# Patient Record
Sex: FEMALE | Race: WHITE | NOT HISPANIC OR LATINO
[De-identification: names, ages, dates, MRNs, and addresses within clinical notes are randomized per-mention and may not be internally consistent; named-entity substitution may affect disease eponyms.]

---

## 2020-03-10 PROBLEM — Z00.00 ENCOUNTER FOR PREVENTIVE HEALTH EXAMINATION: Status: ACTIVE | Noted: 2020-03-10

## 2020-03-19 ENCOUNTER — NON-APPOINTMENT (OUTPATIENT)
Age: 80
End: 2020-03-19

## 2020-03-19 ENCOUNTER — APPOINTMENT (OUTPATIENT)
Dept: HEART AND VASCULAR | Facility: CLINIC | Age: 80
End: 2020-03-19
Payer: MEDICARE

## 2020-03-19 VITALS
WEIGHT: 116 LBS | SYSTOLIC BLOOD PRESSURE: 160 MMHG | BODY MASS INDEX: 21.9 KG/M2 | HEIGHT: 61 IN | OXYGEN SATURATION: 98 % | DIASTOLIC BLOOD PRESSURE: 80 MMHG | HEART RATE: 88 BPM | TEMPERATURE: 97.6 F

## 2020-03-19 PROCEDURE — 93000 ELECTROCARDIOGRAM COMPLETE: CPT

## 2020-03-19 PROCEDURE — 36415 COLL VENOUS BLD VENIPUNCTURE: CPT

## 2020-03-19 PROCEDURE — 99204 OFFICE O/P NEW MOD 45 MIN: CPT

## 2020-03-19 NOTE — REASON FOR VISIT
[FreeTextEntry1] : 79 y/o F with HTN, has reduced her meds.   Pt had a BP recently that was very high. She is here for management of her BP.  SHe is the wife of Nathanjose Mendiola.\par \par EKG: NSR, left anterior hemiblock, possible LVH, no ST-Tw abn. 3/18/20\par

## 2020-03-19 NOTE — PHYSICAL EXAM
[General Appearance - Well Developed] : well developed [Normal Appearance] : normal appearance [Well Groomed] : well groomed [General Appearance - Well Nourished] : well nourished [No Deformities] : no deformities [General Appearance - In No Acute Distress] : no acute distress [Normal Conjunctiva] : the conjunctiva exhibited no abnormalities [Eyelids - No Xanthelasma] : the eyelids demonstrated no xanthelasmas [Normal Oral Mucosa] : normal oral mucosa [No Oral Pallor] : no oral pallor [No Oral Cyanosis] : no oral cyanosis [Heart Rate And Rhythm] : heart rate and rhythm were normal [Heart Sounds] : normal S1 and S2 [Respiration, Rhythm And Depth] : normal respiratory rhythm and effort [Exaggerated Use Of Accessory Muscles For Inspiration] : no accessory muscle use [Auscultation Breath Sounds / Voice Sounds] : lungs were clear to auscultation bilaterally [Abdomen Soft] : soft [Abdomen Tenderness] : non-tender [Abdomen Mass (___ Cm)] : no abdominal mass palpated [Abnormal Walk] : normal gait [Gait - Sufficient For Exercise Testing] : the gait was sufficient for exercise testing [Nail Clubbing] : no clubbing of the fingernails [Cyanosis, Localized] : no localized cyanosis [Petechial Hemorrhages (___cm)] : no petechial hemorrhages [Skin Color & Pigmentation] : normal skin color and pigmentation [] : no rash [No Venous Stasis] : no venous stasis [Skin Lesions] : no skin lesions [No Skin Ulcers] : no skin ulcer [No Xanthoma] : no  xanthoma was observed [FreeTextEntry1] : 2/6 diastolic blowing murmur c/w AI

## 2020-03-19 NOTE — ASSESSMENT
[FreeTextEntry1] : HTN-  will increase Lisinopril to 20mg, labs drawn\par \par Murmur- c/w AI\par \par EKG  with a LAHB and possible LVH, will echo after BP controlled\par \par Compliance issue-  told to take pills\par \par Health Maintainence- no recent mammogram, saw GYN, colonoscopy current

## 2020-03-19 NOTE — HISTORY OF PRESENT ILLNESS
[FreeTextEntry1] : PCP-no one\par GYN-2020 on [address], mammogram needed\par GI- Dr Uriostegui 2015 colonoscopy

## 2020-03-20 LAB
ALBUMIN SERPL ELPH-MCNC: 4.4 G/DL
ALP BLD-CCNC: 68 U/L
ALT SERPL-CCNC: 12 U/L
ANION GAP SERPL CALC-SCNC: 11 MMOL/L
APPEARANCE: CLEAR
AST SERPL-CCNC: 16 U/L
BACTERIA: NEGATIVE
BASOPHILS # BLD AUTO: 0.02 K/UL
BASOPHILS NFR BLD AUTO: 0.3 %
BILIRUB SERPL-MCNC: 0.7 MG/DL
BILIRUBIN URINE: NEGATIVE
BLOOD URINE: NEGATIVE
BUN SERPL-MCNC: 18 MG/DL
CALCIUM SERPL-MCNC: 9.5 MG/DL
CHLORIDE SERPL-SCNC: 98 MMOL/L
CHOLEST SERPL-MCNC: 259 MG/DL
CHOLEST/HDLC SERPL: 2.7 RATIO
CO2 SERPL-SCNC: 29 MMOL/L
COLOR: NORMAL
CREAT SERPL-MCNC: 0.74 MG/DL
EOSINOPHIL # BLD AUTO: 0.07 K/UL
EOSINOPHIL NFR BLD AUTO: 1.1 %
ESTIMATED AVERAGE GLUCOSE: 103 MG/DL
GLUCOSE QUALITATIVE U: NEGATIVE
GLUCOSE SERPL-MCNC: 99 MG/DL
HBA1C MFR BLD HPLC: 5.2 %
HCT VFR BLD CALC: 43.5 %
HDLC SERPL-MCNC: 95 MG/DL
HGB BLD-MCNC: 13.5 G/DL
HYALINE CASTS: 0 /LPF
IMM GRANULOCYTES NFR BLD AUTO: 0.2 %
KETONES URINE: NEGATIVE
LDLC SERPL CALC-MCNC: 151 MG/DL
LEUKOCYTE ESTERASE URINE: ABNORMAL
LYMPHOCYTES # BLD AUTO: 1.68 K/UL
LYMPHOCYTES NFR BLD AUTO: 25.6 %
MAN DIFF?: NORMAL
MCHC RBC-ENTMCNC: 30.2 PG
MCHC RBC-ENTMCNC: 31 GM/DL
MCV RBC AUTO: 97.3 FL
MICROSCOPIC-UA: NORMAL
MONOCYTES # BLD AUTO: 0.55 K/UL
MONOCYTES NFR BLD AUTO: 8.4 %
NEUTROPHILS # BLD AUTO: 4.24 K/UL
NEUTROPHILS NFR BLD AUTO: 64.4 %
NITRITE URINE: NEGATIVE
PH URINE: 5.5
PLATELET # BLD AUTO: 193 K/UL
POTASSIUM SERPL-SCNC: 4 MMOL/L
PROT SERPL-MCNC: 6.3 G/DL
PROTEIN URINE: NEGATIVE
RBC # BLD: 4.47 M/UL
RBC # FLD: 12.9 %
RED BLOOD CELLS URINE: 0 /HPF
SODIUM SERPL-SCNC: 138 MMOL/L
SPECIFIC GRAVITY URINE: 1.01
SQUAMOUS EPITHELIAL CELLS: 1 /HPF
TRIGL SERPL-MCNC: 63 MG/DL
UROBILINOGEN URINE: NORMAL
WBC # FLD AUTO: 6.57 K/UL
WHITE BLOOD CELLS URINE: 3 /HPF

## 2020-06-02 ENCOUNTER — APPOINTMENT (OUTPATIENT)
Dept: HEART AND VASCULAR | Facility: CLINIC | Age: 80
End: 2020-06-02
Payer: MEDICARE

## 2020-06-02 VITALS
OXYGEN SATURATION: 96 % | DIASTOLIC BLOOD PRESSURE: 60 MMHG | BODY MASS INDEX: 22.28 KG/M2 | SYSTOLIC BLOOD PRESSURE: 120 MMHG | TEMPERATURE: 98.5 F | HEIGHT: 61 IN | WEIGHT: 117.99 LBS | HEART RATE: 99 BPM

## 2020-06-02 PROCEDURE — 99213 OFFICE O/P EST LOW 20 MIN: CPT

## 2020-06-02 NOTE — PHYSICAL EXAM
[General Appearance - Well Developed] : well developed [Normal Appearance] : normal appearance [Well Groomed] : well groomed [General Appearance - Well Nourished] : well nourished [No Deformities] : no deformities [General Appearance - In No Acute Distress] : no acute distress [Normal Conjunctiva] : the conjunctiva exhibited no abnormalities [Eyelids - No Xanthelasma] : the eyelids demonstrated no xanthelasmas [Normal Oral Mucosa] : normal oral mucosa [No Oral Pallor] : no oral pallor [No Oral Cyanosis] : no oral cyanosis [Exaggerated Use Of Accessory Muscles For Inspiration] : no accessory muscle use [Respiration, Rhythm And Depth] : normal respiratory rhythm and effort [Auscultation Breath Sounds / Voice Sounds] : lungs were clear to auscultation bilaterally [Heart Rate And Rhythm] : heart rate and rhythm were normal [Heart Sounds] : normal S1 and S2 [Abdomen Soft] : soft [Abdomen Tenderness] : non-tender [Abdomen Mass (___ Cm)] : no abdominal mass palpated [Gait - Sufficient For Exercise Testing] : the gait was sufficient for exercise testing [Abnormal Walk] : normal gait [Nail Clubbing] : no clubbing of the fingernails [Petechial Hemorrhages (___cm)] : no petechial hemorrhages [Cyanosis, Localized] : no localized cyanosis [Skin Color & Pigmentation] : normal skin color and pigmentation [No Venous Stasis] : no venous stasis [] : no rash [Skin Lesions] : no skin lesions [No Skin Ulcers] : no skin ulcer [No Xanthoma] : no  xanthoma was observed [FreeTextEntry1] : 2/6 diastolic blowing murmur c/w AI

## 2020-06-02 NOTE — ASSESSMENT
[FreeTextEntry1] : HTN-  will increase Lisinopril to 20mg, labs drawn.  Cr 0.74 on lisinopril 10mg.  BP excellent on 20mg\par \par Murmur- c/w AI, echo ordered\par \par EKG  with a LAHB and possible LVH, will echo after BP controlled\par \par Compliance issue-  told to take pills\par \par Health Maintainence- no recent mammogram, saw GYN, colonoscopy current

## 2020-06-02 NOTE — REASON FOR VISIT
[FreeTextEntry1] : 79 y/o F with HTN, has reduced her meds.   Pt had a BP recently that was very high. She is here for management of her BP.  SHe is the wife of Nathan Mendiola.\par \par 6/2/20- here for BP f/u after she was hypertensive last visit and we increased the Lisinopril.\par \par EKG: NSR, left anterior hemiblock, possible LVH, no ST-Tw abn. 3/18/20\par

## 2020-10-05 ENCOUNTER — APPOINTMENT (OUTPATIENT)
Dept: HEART AND VASCULAR | Facility: CLINIC | Age: 80
End: 2020-10-05
Payer: MEDICARE

## 2020-10-05 VITALS
BODY MASS INDEX: 21.25 KG/M2 | HEIGHT: 61.42 IN | HEART RATE: 103 BPM | DIASTOLIC BLOOD PRESSURE: 60 MMHG | OXYGEN SATURATION: 97 % | SYSTOLIC BLOOD PRESSURE: 122 MMHG | WEIGHT: 114 LBS | TEMPERATURE: 98.8 F

## 2020-10-05 PROCEDURE — 99214 OFFICE O/P EST MOD 30 MIN: CPT

## 2020-10-05 PROCEDURE — 93306 TTE W/DOPPLER COMPLETE: CPT

## 2020-10-05 PROCEDURE — 36415 COLL VENOUS BLD VENIPUNCTURE: CPT

## 2020-10-05 NOTE — REASON FOR VISIT
[FreeTextEntry1] : 81 y/o F with HTN, has reduced her meds.   Pt had a BP recently that was very high. She is here for management of her BP.  SHe is the wife of Nathan Mendiola.\par \par 6/2/20- here for BP f/u after she was hypertensive last visit and we increased the Lisinopril.\par 10/5/20 BP good but taking a larger dose of HCTZ.  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 3/18/20\par

## 2020-10-05 NOTE — PHYSICAL EXAM
[General Appearance - Well Developed] : well developed [Normal Appearance] : normal appearance [Well Groomed] : well groomed [General Appearance - Well Nourished] : well nourished [No Deformities] : no deformities [General Appearance - In No Acute Distress] : no acute distress [Normal Conjunctiva] : the conjunctiva exhibited no abnormalities [Eyelids - No Xanthelasma] : the eyelids demonstrated no xanthelasmas [Respiration, Rhythm And Depth] : normal respiratory rhythm and effort [Exaggerated Use Of Accessory Muscles For Inspiration] : no accessory muscle use [Auscultation Breath Sounds / Voice Sounds] : lungs were clear to auscultation bilaterally [Heart Rate And Rhythm] : heart rate and rhythm were normal [Heart Sounds] : normal S1 and S2 [Abdomen Soft] : soft [Abdomen Tenderness] : non-tender [Abdomen Mass (___ Cm)] : no abdominal mass palpated [Abnormal Walk] : normal gait [Gait - Sufficient For Exercise Testing] : the gait was sufficient for exercise testing [Nail Clubbing] : no clubbing of the fingernails [Cyanosis, Localized] : no localized cyanosis [Petechial Hemorrhages (___cm)] : no petechial hemorrhages [Skin Color & Pigmentation] : normal skin color and pigmentation [] : no rash [No Venous Stasis] : no venous stasis [Skin Lesions] : no skin lesions [No Skin Ulcers] : no skin ulcer [No Xanthoma] : no  xanthoma was observed [FreeTextEntry1] : 2/6 diastolic blowing murmur c/w AI

## 2020-10-05 NOTE — ASSESSMENT
[FreeTextEntry1] : HTN-  will increase Lisinopril to 20mg, labs drawn.  Cr 0.74 on lisinopril 10mg.  BP excellent on 20mg. Chems and UA sent.\par \par Murmur- c/w AI, echo ordered.  It confirms mild AI.\par \par EKG  with a LAHB and possible LVH, will echo after BP controlled.  Mild AI, \par \par Compliance issue-  told to take pills\par \par Health Maintainence- no recent mammogram, saw GYN, colonoscopy current.  Mammo ordered

## 2020-10-06 LAB
ALBUMIN SERPL ELPH-MCNC: 4.6 G/DL
ALP BLD-CCNC: 63 U/L
ALT SERPL-CCNC: 13 U/L
ANION GAP SERPL CALC-SCNC: 13 MMOL/L
APPEARANCE: ABNORMAL
AST SERPL-CCNC: 20 U/L
BACTERIA: ABNORMAL
BILIRUB SERPL-MCNC: 0.7 MG/DL
BILIRUBIN URINE: NEGATIVE
BLOOD URINE: NEGATIVE
BUN SERPL-MCNC: 19 MG/DL
CALCIUM SERPL-MCNC: 9.9 MG/DL
CHLORIDE SERPL-SCNC: 97 MMOL/L
CO2 SERPL-SCNC: 26 MMOL/L
COLOR: YELLOW
CREAT SERPL-MCNC: 0.91 MG/DL
GLUCOSE QUALITATIVE U: NEGATIVE
GLUCOSE SERPL-MCNC: 79 MG/DL
HYALINE CASTS: 3 /LPF
KETONES URINE: NEGATIVE
LEUKOCYTE ESTERASE URINE: ABNORMAL
MICROSCOPIC-UA: NORMAL
NITRITE URINE: NEGATIVE
PH URINE: 6.5
POTASSIUM SERPL-SCNC: 4.4 MMOL/L
PROT SERPL-MCNC: 6.9 G/DL
PROTEIN URINE: NEGATIVE
RED BLOOD CELLS URINE: 1 /HPF
SODIUM SERPL-SCNC: 136 MMOL/L
SPECIFIC GRAVITY URINE: 1.01
SQUAMOUS EPITHELIAL CELLS: 2 /HPF
UROBILINOGEN URINE: NORMAL
WHITE BLOOD CELLS URINE: 12 /HPF

## 2020-11-25 ENCOUNTER — EMERGENCY (EMERGENCY)
Facility: HOSPITAL | Age: 80
LOS: 1 days | Discharge: HOME | End: 2020-11-25
Attending: EMERGENCY MEDICINE | Admitting: EMERGENCY MEDICINE
Payer: MEDICARE

## 2020-11-25 VITALS
TEMPERATURE: 98 F | RESPIRATION RATE: 18 BRPM | DIASTOLIC BLOOD PRESSURE: 79 MMHG | OXYGEN SATURATION: 96 % | SYSTOLIC BLOOD PRESSURE: 160 MMHG | HEART RATE: 88 BPM

## 2020-11-25 DIAGNOSIS — Z20.828 CONTACT WITH AND (SUSPECTED) EXPOSURE TO OTHER VIRAL COMMUNICABLE DISEASES: ICD-10-CM

## 2020-11-25 DIAGNOSIS — I10 ESSENTIAL (PRIMARY) HYPERTENSION: ICD-10-CM

## 2020-11-25 LAB — SARS-COV-2 RNA SPEC QL NAA+PROBE: SIGNIFICANT CHANGE UP

## 2020-11-25 PROCEDURE — 99283 EMERGENCY DEPT VISIT LOW MDM: CPT | Mod: CS

## 2020-11-25 PROCEDURE — U0003: CPT

## 2020-11-25 PROCEDURE — 99283 EMERGENCY DEPT VISIT LOW MDM: CPT

## 2020-11-25 NOTE — ED PROVIDER NOTE - CLINICAL SUMMARY MEDICAL DECISION MAKING FREE TEXT BOX
Patient is presenting to the Emergency Department and requesting a COVID-19 test.  Patient denies any symptoms, has an unremarkable focused exam and looks well.  Nasopharyngeal PCR has been obtained and patient has been guided on how to obtain their results.  General COVID-19 discharge instructions have been given to the patient.     BP addressed.

## 2020-11-25 NOTE — ED PROVIDER NOTE - CARE PLAN
Principal Discharge DX:	Encounter for laboratory testing for COVID-19 virus  Secondary Diagnosis:	Hypertension, unspecified type

## 2021-04-08 ENCOUNTER — NON-APPOINTMENT (OUTPATIENT)
Age: 81
End: 2021-04-08

## 2021-04-08 ENCOUNTER — APPOINTMENT (OUTPATIENT)
Dept: HEART AND VASCULAR | Facility: CLINIC | Age: 81
End: 2021-04-08
Payer: MEDICARE

## 2021-04-08 VITALS
DIASTOLIC BLOOD PRESSURE: 60 MMHG | WEIGHT: 114.99 LBS | BODY MASS INDEX: 21.71 KG/M2 | HEART RATE: 96 BPM | HEIGHT: 61 IN | SYSTOLIC BLOOD PRESSURE: 110 MMHG | OXYGEN SATURATION: 97 % | TEMPERATURE: 98.8 F

## 2021-04-08 PROCEDURE — 99213 OFFICE O/P EST LOW 20 MIN: CPT

## 2021-04-08 PROCEDURE — 93000 ELECTROCARDIOGRAM COMPLETE: CPT

## 2021-04-08 NOTE — PHYSICAL EXAM
[General Appearance - Well Developed] : well developed [Normal Appearance] : normal appearance [Well Groomed] : well groomed [General Appearance - Well Nourished] : well nourished [No Deformities] : no deformities [General Appearance - In No Acute Distress] : no acute distress [Normal Conjunctiva] : the conjunctiva exhibited no abnormalities [Eyelids - No Xanthelasma] : the eyelids demonstrated no xanthelasmas [Respiration, Rhythm And Depth] : normal respiratory rhythm and effort [Exaggerated Use Of Accessory Muscles For Inspiration] : no accessory muscle use [Auscultation Breath Sounds / Voice Sounds] : lungs were clear to auscultation bilaterally [Heart Rate And Rhythm] : heart rate and rhythm were normal [Heart Sounds] : normal S1 and S2 [Abdomen Soft] : soft [Abdomen Tenderness] : non-tender [Abdomen Mass (___ Cm)] : no abdominal mass palpated [Abnormal Walk] : normal gait [Gait - Sufficient For Exercise Testing] : the gait was sufficient for exercise testing [Nail Clubbing] : no clubbing of the fingernails [Cyanosis, Localized] : no localized cyanosis [Petechial Hemorrhages (___cm)] : no petechial hemorrhages [] : no rash [Skin Color & Pigmentation] : normal skin color and pigmentation [No Venous Stasis] : no venous stasis [Skin Lesions] : no skin lesions [No Skin Ulcers] : no skin ulcer [No Xanthoma] : no  xanthoma was observed [FreeTextEntry1] : 2/6 diastolic blowing murmur c/w AI

## 2021-04-08 NOTE — REASON FOR VISIT
[FreeTextEntry1] : 79 y/o F with HTN, has reduced her meds.   Pt had a BP recently that was very high. She is here for management of her BP.  SHe is the wife of Nathan Mendiola.\par \par 6/2/20- here for BP f/u after she was hypertensive last visit and we increased the Lisinopril.\par 10/5/20 BP good but taking a larger dose of HCTZ.  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 3/18/20\par

## 2021-04-08 NOTE — ASSESSMENT
[FreeTextEntry1] : HTN-  will increase Lisinopril to 20mg, labs drawn.  Cr 0.74 on lisinopril 10mg.  BP excellent on 20mg. Chems and UA sent.  New labs excellent.\par \par Murmur- c/w AI, echo ordered.  It confirms mild AI.\par \par EKG  with a LAHB and possible LVH, will echo after BP controlled.  Mild AI, \par \par Compliance issue-  told to take pills\par \par Health Maintainence- no recent mammogram, saw GYN, colonoscopy current.  Mammo ordered.  Done at [facility], I do not have the result

## 2021-04-12 ENCOUNTER — RX RENEWAL (OUTPATIENT)
Age: 81
End: 2021-04-12

## 2021-09-13 ENCOUNTER — APPOINTMENT (OUTPATIENT)
Dept: HEART AND VASCULAR | Facility: CLINIC | Age: 81
End: 2021-09-13
Payer: MEDICARE

## 2021-09-13 VITALS
WEIGHT: 117.99 LBS | OXYGEN SATURATION: 97 % | TEMPERATURE: 98.4 F | SYSTOLIC BLOOD PRESSURE: 130 MMHG | HEART RATE: 100 BPM | DIASTOLIC BLOOD PRESSURE: 76 MMHG | BODY MASS INDEX: 22.28 KG/M2 | HEIGHT: 61 IN

## 2021-09-13 PROCEDURE — 90662 IIV NO PRSV INCREASED AG IM: CPT

## 2021-09-13 PROCEDURE — 99213 OFFICE O/P EST LOW 20 MIN: CPT

## 2021-09-13 PROCEDURE — G0008: CPT

## 2021-09-13 NOTE — ASSESSMENT
[FreeTextEntry1] : HTN-  will increase Lisinopril to 20mg, labs drawn.  Cr 0.74 on lisinopril 10mg.  BP excellent on 20mg. Chems and UA sent.  New labs excellent.  BP controlled\par \par Murmur- c/w AI, echo ordered.  It confirms mild AI.\par \par EKG  with a LAHB and possible LVH, will echo after BP controlled.  Mild AI, \par \par Compliance issue-  told to take pills\par \par Health Maintainence- no recent mammogram, saw GYN, colonoscopy current.  Mammo ordered.  Done at [facility], I do not have the result. Fluzoner HD given

## 2021-09-13 NOTE — HISTORY OF PRESENT ILLNESS
[FreeTextEntry1] : PCP \par GYN-2020 on [address], mammogram needed\par GI- Dr Uriostegui 2015 colonoscopy

## 2021-09-13 NOTE — REASON FOR VISIT
[FreeTextEntry1] : 82 y/o F with HTN, has reduced her meds.   Pt had a BP recently that was very high. She is here for management of her BP.  SHe is the wife of Nathan Mendiola.\par \par 6/2/20- here for BP f/u after she was hypertensive last visit and we increased the Lisinopril.\par 10/5/20 BP good but taking a larger dose of HCTZ.  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 3/18/20\par

## 2021-10-12 ENCOUNTER — RX RENEWAL (OUTPATIENT)
Age: 81
End: 2021-10-12

## 2022-03-24 ENCOUNTER — APPOINTMENT (OUTPATIENT)
Dept: HEART AND VASCULAR | Facility: CLINIC | Age: 82
End: 2022-03-24
Payer: MEDICARE

## 2022-03-24 VITALS
DIASTOLIC BLOOD PRESSURE: 66 MMHG | OXYGEN SATURATION: 96 % | WEIGHT: 116 LBS | HEART RATE: 95 BPM | HEIGHT: 61 IN | SYSTOLIC BLOOD PRESSURE: 130 MMHG | BODY MASS INDEX: 21.9 KG/M2

## 2022-03-24 VITALS
BODY MASS INDEX: 21.9 KG/M2 | HEART RATE: 95 BPM | OXYGEN SATURATION: 96 % | WEIGHT: 115.99 LBS | SYSTOLIC BLOOD PRESSURE: 130 MMHG | HEIGHT: 61 IN | DIASTOLIC BLOOD PRESSURE: 66 MMHG | TEMPERATURE: 94.8 F

## 2022-03-24 PROCEDURE — 36415 COLL VENOUS BLD VENIPUNCTURE: CPT

## 2022-03-24 PROCEDURE — 93000 ELECTROCARDIOGRAM COMPLETE: CPT

## 2022-03-24 PROCEDURE — 99213 OFFICE O/P EST LOW 20 MIN: CPT

## 2022-03-24 NOTE — ASSESSMENT
[FreeTextEntry1] : HTN-  will increase Lisinopril to 20mg, labs drawn.  Cr 0.74 on lisinopril 10mg.  BP excellent on 20mg. Chems and UA sent.  New labs excellent.  BP controlled.  Also on HCTZ QOD.  Labs were drawn today in Office. \par \par Murmur- c/w AI, echo ordered.  It confirms mild AI.\par \par EKG  with a LAHB and possible LVH, will echo after BP controlled.  Mild AI, \par \par Compliance issue-  told to take pills\par \par Health Maintainence- no recent mammogram, saw GYN, colonoscopy current.  Mammo ordered.  Done at [facility], I do not have the result. Fluzoner HD given.  Had  had booster shot for Covid.  Will need Pneumovax

## 2022-03-24 NOTE — REASON FOR VISIT
[FreeTextEntry1] : 81 y/o F with HTN, has reduced her meds.   Pt had a BP recently that was very high. She is here for management of her BP.  SHe is the wife of Nathan Mendiola.\par \par 6/2/20- here for BP f/u after she was hypertensive last visit and we increased the Lisinopril.\par 10/5/20 BP good but taking a larger dose of HCTZ.  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 3/18/20, 3/24/22\par

## 2022-03-24 NOTE — HISTORY OF PRESENT ILLNESS
[FreeTextEntry1] : PCP \par GYN-2021 on [address], mammogram done\par GI- Dr Uriostegui 2015 colonoscopy\par Derm-

## 2022-03-25 LAB
ALBUMIN SERPL ELPH-MCNC: 4.5 G/DL
ALP BLD-CCNC: 72 U/L
ALT SERPL-CCNC: 11 U/L
ANION GAP SERPL CALC-SCNC: 12 MMOL/L
AST SERPL-CCNC: 17 U/L
BASOPHILS # BLD AUTO: 0.02 K/UL
BASOPHILS NFR BLD AUTO: 0.3 %
BILIRUB SERPL-MCNC: 0.4 MG/DL
BUN SERPL-MCNC: 14 MG/DL
CALCIUM SERPL-MCNC: 9.7 MG/DL
CHLORIDE SERPL-SCNC: 100 MMOL/L
CHOLEST SERPL-MCNC: 273 MG/DL
CO2 SERPL-SCNC: 26 MMOL/L
CREAT SERPL-MCNC: 0.75 MG/DL
EGFR: 80 ML/MIN/1.73M2
EOSINOPHIL # BLD AUTO: 0.21 K/UL
EOSINOPHIL NFR BLD AUTO: 2.6 %
ESTIMATED AVERAGE GLUCOSE: 105 MG/DL
GLUCOSE SERPL-MCNC: 84 MG/DL
HBA1C MFR BLD HPLC: 5.3 %
HCT VFR BLD CALC: 42.4 %
HDLC SERPL-MCNC: 91 MG/DL
HGB BLD-MCNC: 13.4 G/DL
IMM GRANULOCYTES NFR BLD AUTO: 0.4 %
LDLC SERPL CALC-MCNC: 160 MG/DL
LYMPHOCYTES # BLD AUTO: 1.76 K/UL
LYMPHOCYTES NFR BLD AUTO: 22.1 %
MAN DIFF?: NORMAL
MCHC RBC-ENTMCNC: 29.4 PG
MCHC RBC-ENTMCNC: 31.6 GM/DL
MCV RBC AUTO: 93 FL
MONOCYTES # BLD AUTO: 0.68 K/UL
MONOCYTES NFR BLD AUTO: 8.6 %
NEUTROPHILS # BLD AUTO: 5.25 K/UL
NEUTROPHILS NFR BLD AUTO: 66 %
NONHDLC SERPL-MCNC: 182 MG/DL
PLATELET # BLD AUTO: 229 K/UL
POTASSIUM SERPL-SCNC: 4.7 MMOL/L
PROT SERPL-MCNC: 6.8 G/DL
RBC # BLD: 4.56 M/UL
RBC # FLD: 13 %
SODIUM SERPL-SCNC: 138 MMOL/L
TRIGL SERPL-MCNC: 109 MG/DL
TSH SERPL-ACNC: 3.57 UIU/ML
WBC # FLD AUTO: 7.95 K/UL

## 2023-01-11 ENCOUNTER — APPOINTMENT (OUTPATIENT)
Dept: HEART AND VASCULAR | Facility: CLINIC | Age: 83
End: 2023-01-11
Payer: MEDICARE

## 2023-01-11 ENCOUNTER — NON-APPOINTMENT (OUTPATIENT)
Age: 83
End: 2023-01-11

## 2023-01-11 VITALS
OXYGEN SATURATION: 98 % | HEIGHT: 61 IN | TEMPERATURE: 98.7 F | BODY MASS INDEX: 21.9 KG/M2 | WEIGHT: 116 LBS | SYSTOLIC BLOOD PRESSURE: 120 MMHG | DIASTOLIC BLOOD PRESSURE: 70 MMHG

## 2023-01-11 PROCEDURE — 93000 ELECTROCARDIOGRAM COMPLETE: CPT

## 2023-01-11 PROCEDURE — 99214 OFFICE O/P EST MOD 30 MIN: CPT

## 2023-01-11 PROCEDURE — 36415 COLL VENOUS BLD VENIPUNCTURE: CPT

## 2023-01-11 NOTE — REASON FOR VISIT
[FreeTextEntry1] : 83 y/o F with HTN, has reduced her meds.   Pt had a BP recently that was very high. She is here for management of her BP.  SHe is the wife of Nathan Mendiola.\par \par 6/2/20- here for BP f/u after she was hypertensive last visit and we increased the Lisinopril.\par 10/5/20 BP good but taking a larger dose of HCTZ.  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  They are deep abd symmetrical.  Pt denies any c/o but her daughter says she felt very ill for 5 hrs 2 weeks ago.\par \par EKG: NSR, left anterior hemiblock, possible LVH, no ST-Tw abn. 3/18/20, 3/24/22\par

## 2023-01-11 NOTE — ASSESSMENT
[FreeTextEntry1] : New flipped Tw- 1/11/22, pt asymptomatic but daughter tellls me she felt ill for 5 hrs 2 wks ago.  Trop sent, echo tomorrow, will WMA then cath. If not consider CCTA or Nuc.\par \par HTN-  will increase Lisinopril to 20mg, labs drawn.  Cr 0.74 on lisinopril 10mg.  BP excellent on 20mg. Chems and UA sent.  New labs excellent.  BP controlled.  Also on HCTZ QOD.  Labs were drawn today in Office. \par \par Murmur- c/w AI, echo ordered.  It confirms mild AI.\par \par EKG  with a LAHB and possible LVH, will echo after BP controlled.  Mild AI, \par \par Compliance issue-  told to take pills\par \par Health Maintainence- no recent mammogram, saw GYN, colonoscopy current.  Mammo ordered.  Done at [facility], I do not have the result. Fluzoner HD given.  Had  had booster shot for Covid.  Will need Pneumovax

## 2023-01-12 ENCOUNTER — APPOINTMENT (OUTPATIENT)
Dept: HEART AND VASCULAR | Facility: CLINIC | Age: 83
End: 2023-01-12
Payer: MEDICARE

## 2023-01-12 VITALS
HEIGHT: 61 IN | BODY MASS INDEX: 21.71 KG/M2 | TEMPERATURE: 98.3 F | SYSTOLIC BLOOD PRESSURE: 122 MMHG | DIASTOLIC BLOOD PRESSURE: 66 MMHG | HEART RATE: 98 BPM | WEIGHT: 115 LBS | OXYGEN SATURATION: 98 %

## 2023-01-12 LAB
ALBUMIN SERPL ELPH-MCNC: 4.6 G/DL
ALP BLD-CCNC: 65 U/L
ALT SERPL-CCNC: 13 U/L
ANION GAP SERPL CALC-SCNC: 12 MMOL/L
AST SERPL-CCNC: 18 U/L
BASOPHILS # BLD AUTO: 0.02 K/UL
BASOPHILS NFR BLD AUTO: 0.3 %
BILIRUB SERPL-MCNC: 0.7 MG/DL
BUN SERPL-MCNC: 22 MG/DL
CALCIUM SERPL-MCNC: 9.6 MG/DL
CHLORIDE SERPL-SCNC: 100 MMOL/L
CHOLEST SERPL-MCNC: 272 MG/DL
CO2 SERPL-SCNC: 26 MMOL/L
CREAT SERPL-MCNC: 0.82 MG/DL
EGFR: 72 ML/MIN/1.73M2
EOSINOPHIL # BLD AUTO: 0.11 K/UL
EOSINOPHIL NFR BLD AUTO: 1.5 %
GLUCOSE SERPL-MCNC: 80 MG/DL
HCT VFR BLD CALC: 42 %
HDLC SERPL-MCNC: 93 MG/DL
HGB BLD-MCNC: 13.2 G/DL
IMM GRANULOCYTES NFR BLD AUTO: 0.3 %
LDLC SERPL CALC-MCNC: 161 MG/DL
LYMPHOCYTES # BLD AUTO: 2.03 K/UL
LYMPHOCYTES NFR BLD AUTO: 27.1 %
MAN DIFF?: NORMAL
MCHC RBC-ENTMCNC: 30.1 PG
MCHC RBC-ENTMCNC: 31.4 GM/DL
MCV RBC AUTO: 95.9 FL
MONOCYTES # BLD AUTO: 0.64 K/UL
MONOCYTES NFR BLD AUTO: 8.5 %
NEUTROPHILS # BLD AUTO: 4.67 K/UL
NEUTROPHILS NFR BLD AUTO: 62.3 %
NONHDLC SERPL-MCNC: 179 MG/DL
PLATELET # BLD AUTO: 203 K/UL
POTASSIUM SERPL-SCNC: 4.1 MMOL/L
PROT SERPL-MCNC: 6.8 G/DL
RBC # BLD: 4.38 M/UL
RBC # FLD: 12.6 %
SODIUM SERPL-SCNC: 138 MMOL/L
TRIGL SERPL-MCNC: 88 MG/DL
TROPONIN-T, HIGH SENSITIVITY: 9 NG/L
WBC # FLD AUTO: 7.49 K/UL

## 2023-01-12 PROCEDURE — 93306 TTE W/DOPPLER COMPLETE: CPT

## 2023-01-17 ENCOUNTER — APPOINTMENT (OUTPATIENT)
Dept: HEART AND VASCULAR | Facility: CLINIC | Age: 83
End: 2023-01-17
Payer: MEDICARE

## 2023-01-17 VITALS — BODY MASS INDEX: 21.71 KG/M2 | HEIGHT: 61 IN | WEIGHT: 115 LBS

## 2023-01-17 PROCEDURE — A9500: CPT

## 2023-01-17 PROCEDURE — 36415 COLL VENOUS BLD VENIPUNCTURE: CPT

## 2023-01-17 PROCEDURE — 78452 HT MUSCLE IMAGE SPECT MULT: CPT

## 2023-01-17 PROCEDURE — 93306 TTE W/DOPPLER COMPLETE: CPT

## 2023-01-17 PROCEDURE — 93015 CV STRESS TEST SUPVJ I&R: CPT

## 2023-08-09 ENCOUNTER — NON-APPOINTMENT (OUTPATIENT)
Age: 83
End: 2023-08-09

## 2023-08-09 ENCOUNTER — APPOINTMENT (OUTPATIENT)
Dept: HEART AND VASCULAR | Facility: CLINIC | Age: 83
End: 2023-08-09
Payer: MEDICARE

## 2023-08-09 VITALS
HEART RATE: 87 BPM | OXYGEN SATURATION: 98 % | HEIGHT: 61 IN | SYSTOLIC BLOOD PRESSURE: 140 MMHG | WEIGHT: 114.99 LBS | BODY MASS INDEX: 21.71 KG/M2 | DIASTOLIC BLOOD PRESSURE: 70 MMHG

## 2023-08-09 VITALS
HEIGHT: 61 IN | DIASTOLIC BLOOD PRESSURE: 70 MMHG | WEIGHT: 114 LBS | BODY MASS INDEX: 21.52 KG/M2 | HEART RATE: 90 BPM | OXYGEN SATURATION: 97 % | TEMPERATURE: 98.4 F | RESPIRATION RATE: 16 BRPM | SYSTOLIC BLOOD PRESSURE: 124 MMHG

## 2023-08-09 DIAGNOSIS — Z82.5 FAMILY HISTORY OF ASTHMA AND OTHER CHRONIC LOWER RESPIRATORY DISEASES: ICD-10-CM

## 2023-08-09 DIAGNOSIS — R94.31 ABNORMAL ELECTROCARDIOGRAM [ECG] [EKG]: ICD-10-CM

## 2023-08-09 PROCEDURE — 93000 ELECTROCARDIOGRAM COMPLETE: CPT

## 2023-08-09 PROCEDURE — G0405: CPT

## 2023-08-09 PROCEDURE — 99214 OFFICE O/P EST MOD 30 MIN: CPT

## 2023-08-09 NOTE — ASSESSMENT
[FreeTextEntry1] : New flipped Tw- 1/11/22, pt asymptomatic but daughter tellls me she felt ill for 5 hrs 2 wks ago.  Trop sent, echo .  NL Nuc Jan 2023, EKG normalized.  HTN-  will increase Lisinopril to 20mg, labs drawn.  Cr 0.74 on lisinopril 10mg.  BP excellent on 20mg. Chems and UA sent.  New labs excellent.  BP controlled.  Also on HCTZ QOD.  Labs were drawn today in Office.   Murmur- c/w AI, echo ordered.  It confirms mild AI.  HLD- LDL runs 160 on Lipitor 20 mg, will increase to 40 mg on 8/9/23  EKG  with a LAHB and possible LVH, will echo after BP controlled.  Mild AI,   Compliance issue-  told to take pills  Health Maintainence- no recent mammogram, saw GYN, colonoscopy current.  Mammo ordered.  Done at [facility], I do not have the result. Fluzone HD given.  Had  had booster shot for Covid.  Will need Pneumovax.  Rec RSV vaccine.

## 2023-08-15 RX ORDER — ATORVASTATIN CALCIUM 40 MG/1
40 TABLET, FILM COATED ORAL
Qty: 90 | Refills: 3 | Status: ACTIVE | COMMUNITY
Start: 2023-01-12 | End: 1900-01-01

## 2023-08-15 RX ORDER — LISINOPRIL 20 MG/1
20 TABLET ORAL
Qty: 90 | Refills: 3 | Status: ACTIVE | COMMUNITY
Start: 1900-01-01 | End: 1900-01-01

## 2024-02-07 ENCOUNTER — APPOINTMENT (OUTPATIENT)
Dept: HEART AND VASCULAR | Facility: CLINIC | Age: 84
End: 2024-02-07
Payer: MEDICARE

## 2024-02-07 VITALS
HEART RATE: 93 BPM | OXYGEN SATURATION: 98 % | DIASTOLIC BLOOD PRESSURE: 70 MMHG | TEMPERATURE: 97.6 F | HEIGHT: 61 IN | BODY MASS INDEX: 20.39 KG/M2 | WEIGHT: 108 LBS | SYSTOLIC BLOOD PRESSURE: 108 MMHG

## 2024-02-07 DIAGNOSIS — R01.1 CARDIAC MURMUR, UNSPECIFIED: ICD-10-CM

## 2024-02-07 DIAGNOSIS — E78.00 PURE HYPERCHOLESTEROLEMIA, UNSPECIFIED: ICD-10-CM

## 2024-02-07 DIAGNOSIS — I10 ESSENTIAL (PRIMARY) HYPERTENSION: ICD-10-CM

## 2024-02-07 PROCEDURE — 36415 COLL VENOUS BLD VENIPUNCTURE: CPT

## 2024-02-07 PROCEDURE — 99213 OFFICE O/P EST LOW 20 MIN: CPT

## 2024-02-07 RX ORDER — HYDROCHLOROTHIAZIDE 12.5 MG/1
12.5 TABLET ORAL
Qty: 40 | Refills: 3 | Status: ACTIVE | COMMUNITY
Start: 2021-04-12

## 2024-02-07 NOTE — REASON FOR VISIT
[FreeTextEntry1] : 81 y/o F with HTN, has reduced her meds.   Pt had a BP recently that was very high. She is here for management of her BP.  SHe is the wife of Nathan Mendiola.\par  \par  6/2/20- here for BP f/u after she was hypertensive last visit and we increased the Lisinopril.\par  10/5/20 BP good but taking a larger dose of HCTZ.  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  They are deep abd symmetrical.  Pt denies any c/o but her daughter says she felt very ill for 5 hrs 2 weeks ago.\par  \par  EKG: NSR, left anterior hemiblock, possible LVH, no ST-Tw abn. 3/18/20, 3/24/22\par

## 2024-02-07 NOTE — ASSESSMENT
[FreeTextEntry1] : New flipped Tw- 1/11/22, pt asymptomatic but daughter tells me she felt ill for 5 hrs 2 wks ago.  Trop sent, echo .  NL Nuc Jan 2023, EKG normalized.  HTN-  will increase Lisinopril to 20mg, labs drawn.  Cr 0.74 on lisinopril 10mg.  BP excellent on 20mg. Chems and UA sent.  New labs excellent.  BP controlled.  Also on HCTZ QOD.  Labs were drawn today in Office.   Taking HCTZ daily, given written instructions for MWF  Murmur- c/w AI, echo ordered.  It confirms mild AI.  HLD- LDL runs 160 on Lipitor 20 mg, will increase to 40 mg on 8/9/23  Labs were drawn today in Office.   EKG  with a LAHB and possible LVH, will echo after BP controlled.  Mild AI,   Compliance issue-  told to take pills  Health Maintenance- no recent mammogram, saw GYN, colonoscopy current.  Mammo ordered.  Done at [facility], I do not have the result. Fluzone HD given.  Had  had booster shot for Covid.  Will need Pneumovax.  Rec RSV vaccine.  Flu shot for 0112-4363 given 2/7/24, told Oct is best month.  Told to f/u with Derm and Ophtho.

## 2024-02-09 LAB
ALBUMIN SERPL ELPH-MCNC: 4.4 G/DL
ALP BLD-CCNC: 79 U/L
ALT SERPL-CCNC: 17 U/L
ANION GAP SERPL CALC-SCNC: 8 MMOL/L
AST SERPL-CCNC: 21 U/L
BASOPHILS # BLD AUTO: 0.02 K/UL
BASOPHILS NFR BLD AUTO: 0.3 %
BILIRUB SERPL-MCNC: 0.7 MG/DL
BUN SERPL-MCNC: 19 MG/DL
CALCIUM SERPL-MCNC: 9.9 MG/DL
CHLORIDE SERPL-SCNC: 99 MMOL/L
CHOLEST SERPL-MCNC: 173 MG/DL
CO2 SERPL-SCNC: 29 MMOL/L
CREAT SERPL-MCNC: 0.8 MG/DL
EGFR: 74 ML/MIN/1.73M2
EOSINOPHIL # BLD AUTO: 0.1 K/UL
EOSINOPHIL NFR BLD AUTO: 1.4 %
GLUCOSE SERPL-MCNC: 84 MG/DL
HCT VFR BLD CALC: 42.6 %
HDLC SERPL-MCNC: 85 MG/DL
HGB BLD-MCNC: 13.2 G/DL
IMM GRANULOCYTES NFR BLD AUTO: 0.3 %
LDLC SERPL CALC-MCNC: 76 MG/DL
LYMPHOCYTES # BLD AUTO: 1.19 K/UL
LYMPHOCYTES NFR BLD AUTO: 16.2 %
MAN DIFF?: NORMAL
MCHC RBC-ENTMCNC: 29.3 PG
MCHC RBC-ENTMCNC: 31 GM/DL
MCV RBC AUTO: 94.7 FL
MONOCYTES # BLD AUTO: 0.8 K/UL
MONOCYTES NFR BLD AUTO: 10.9 %
NEUTROPHILS # BLD AUTO: 5.2 K/UL
NEUTROPHILS NFR BLD AUTO: 70.9 %
NONHDLC SERPL-MCNC: 89 MG/DL
PLATELET # BLD AUTO: 200 K/UL
POTASSIUM SERPL-SCNC: 5 MMOL/L
PROT SERPL-MCNC: 6.8 G/DL
RBC # BLD: 4.5 M/UL
RBC # FLD: 13.2 %
SODIUM SERPL-SCNC: 135 MMOL/L
TRIGL SERPL-MCNC: 63 MG/DL
TSH SERPL-ACNC: 2.08 UIU/ML
WBC # FLD AUTO: 7.33 K/UL

## 2024-06-05 ENCOUNTER — APPOINTMENT (OUTPATIENT)
Dept: HEART AND VASCULAR | Facility: CLINIC | Age: 84
End: 2024-06-05

## 2024-07-10 ENCOUNTER — RX RENEWAL (OUTPATIENT)
Age: 84
End: 2024-07-10

## 2024-07-30 ENCOUNTER — APPOINTMENT (OUTPATIENT)
Dept: HEART AND VASCULAR | Facility: CLINIC | Age: 84
End: 2024-07-30
Payer: MEDICARE

## 2024-07-30 ENCOUNTER — NON-APPOINTMENT (OUTPATIENT)
Age: 84
End: 2024-07-30

## 2024-07-30 VITALS
TEMPERATURE: 99.2 F | OXYGEN SATURATION: 97 % | HEART RATE: 87 BPM | DIASTOLIC BLOOD PRESSURE: 78 MMHG | SYSTOLIC BLOOD PRESSURE: 144 MMHG | BODY MASS INDEX: 20.77 KG/M2 | WEIGHT: 110 LBS | HEIGHT: 61 IN

## 2024-07-30 DIAGNOSIS — I10 ESSENTIAL (PRIMARY) HYPERTENSION: ICD-10-CM

## 2024-07-30 DIAGNOSIS — R01.1 CARDIAC MURMUR, UNSPECIFIED: ICD-10-CM

## 2024-07-30 DIAGNOSIS — R94.31 ABNORMAL ELECTROCARDIOGRAM [ECG] [EKG]: ICD-10-CM

## 2024-07-30 DIAGNOSIS — E78.00 PURE HYPERCHOLESTEROLEMIA, UNSPECIFIED: ICD-10-CM

## 2024-07-30 PROCEDURE — G0009: CPT

## 2024-07-30 PROCEDURE — 99214 OFFICE O/P EST MOD 30 MIN: CPT

## 2024-07-30 PROCEDURE — 90677 PCV20 VACCINE IM: CPT

## 2024-07-30 PROCEDURE — 93000 ELECTROCARDIOGRAM COMPLETE: CPT

## 2024-07-30 RX ORDER — ASPIRIN 81 MG/1
81 TABLET, DELAYED RELEASE ORAL
Qty: 90 | Refills: 3 | Status: ACTIVE | COMMUNITY
Start: 2024-07-30

## 2024-07-30 NOTE — ASSESSMENT
[FreeTextEntry1] : New flipped Tw- 1/11/22, pt asymptomatic but daughter tells me she felt ill for 5 hrs 2 wks ago.  Trop sent, echo .  NL Nuc Jan 2023, EKG normalized.  On ASA 81 mg  HTN-  will increase Lisinopril to 20mg, labs drawn.  Cr 0.74 on lisinopril 10mg.  BP excellent on 20mg. Chems and UA sent.  New labs excellent.  BP controlled.  Also on HCTZ QOD.  Labs were drawn today in Office.   Taking HCTZ daily, given written instructions for MWFS.  BP up slightly  Murmur- c/w AI, echo ordered.  It confirms mild AI.  HLD- LDL runs 160 on Lipitor 20 mg, will increase to 40 mg on 8/9/23  Labs were drawn today in Office. LDL 93  EKG  with a LAHB and possible LVH, will echo after BP controlled.  Mild AI,   Compliance issue-  told to take pills  Health Maintenance- no recent mammogram, saw GYN, colonoscopy current.  Mammo ordered.  Done at [facility], I do not have the result. Fluzone HD given.  Had  had booster shot for Covid.  Will need Pneumovax.  Rec RSV vaccine.  Flu shot for [id number] given 2/7/24, told Oct is best month.  Told to f/u with Derm and Ophtho.  Prevnar 20 given 7/30/24.

## 2024-07-30 NOTE — HISTORY OF PRESENT ILLNESS
[FreeTextEntry1] : PCP  GYN-2021 on [address], mammogram done GI- Dr Uriostegui 2015 colonoscopy Derm-  Neuro

## 2024-07-30 NOTE — REASON FOR VISIT
[FreeTextEntry1] : 83 y/o F with HTN, has reduced her meds.   Pt had a BP recently that was very high. She is here for management of her BP.  SHe is the wife of Nathan Mendiola.  6/2/20- here for BP f/u after she was hypertensive last visit and we increased the Lisinopril. 10/5/20 BP good but taking a larger dose of HCTZ.  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 3/24/22  Doing well but can't remember the names of her other MDs 1/11/23 New flipped Tw on EKGI,L, V5-6.  They are deep abd symmetrical.  Pt denies any c/o but her daughter says she felt very ill for 5 hrs 2 weeks ago. 7/30/24  Needs Mohs, Neurocog w/u ongoing in [place].  EKG: NSR, left anterior hemiblock, possible LVH, no ST-Tw abn. 3/18/20, 3/24/22

## 2024-10-14 VITALS
OXYGEN SATURATION: 95 % | RESPIRATION RATE: 16 BRPM | HEART RATE: 110 BPM | SYSTOLIC BLOOD PRESSURE: 109 MMHG | TEMPERATURE: 100 F | DIASTOLIC BLOOD PRESSURE: 67 MMHG

## 2024-10-14 LAB
ADD ON TEST-SPECIMEN IN LAB: SIGNIFICANT CHANGE UP
ANION GAP SERPL CALC-SCNC: 11 MMOL/L — SIGNIFICANT CHANGE UP (ref 5–17)
APPEARANCE UR: ABNORMAL
BACTERIA # UR AUTO: NEGATIVE /HPF — SIGNIFICANT CHANGE UP
BASOPHILS # BLD AUTO: 0 K/UL — SIGNIFICANT CHANGE UP (ref 0–0.2)
BASOPHILS NFR BLD AUTO: 0 % — SIGNIFICANT CHANGE UP (ref 0–2)
BILIRUB UR-MCNC: NEGATIVE — SIGNIFICANT CHANGE UP
BUN SERPL-MCNC: 48 MG/DL — HIGH (ref 7–23)
BURR CELLS BLD QL SMEAR: PRESENT — SIGNIFICANT CHANGE UP
CALCIUM SERPL-MCNC: 7.9 MG/DL — LOW (ref 8.4–10.5)
CAST: 27 /LPF — HIGH (ref 0–4)
CHLORIDE SERPL-SCNC: 95 MMOL/L — LOW (ref 96–108)
CK MB CFR SERPL CALC: 13.2 NG/ML — HIGH (ref 0–6.7)
CK SERPL-CCNC: 371 U/L — HIGH (ref 25–170)
CO2 SERPL-SCNC: 23 MMOL/L — SIGNIFICANT CHANGE UP (ref 22–31)
COARSE GRAN CASTS #/AREA URNS AUTO: PRESENT
COLOR SPEC: YELLOW — SIGNIFICANT CHANGE UP
CREAT SERPL-MCNC: 1.46 MG/DL — HIGH (ref 0.5–1.3)
DIFF PNL FLD: ABNORMAL
EGFR: 36 ML/MIN/1.73M2 — LOW
EOSINOPHIL # BLD AUTO: 0 K/UL — SIGNIFICANT CHANGE UP (ref 0–0.5)
EOSINOPHIL NFR BLD AUTO: 0 % — SIGNIFICANT CHANGE UP (ref 0–6)
GLUCOSE SERPL-MCNC: 120 MG/DL — HIGH (ref 70–99)
GLUCOSE UR QL: NEGATIVE MG/DL — SIGNIFICANT CHANGE UP
HCT VFR BLD CALC: 32.4 % — LOW (ref 34.5–45)
HGB BLD-MCNC: 10.6 G/DL — LOW (ref 11.5–15.5)
KETONES UR-MCNC: NEGATIVE MG/DL — SIGNIFICANT CHANGE UP
LACTATE SERPL-SCNC: 1 MMOL/L — SIGNIFICANT CHANGE UP (ref 0.5–2)
LEUKOCYTE ESTERASE UR-ACNC: ABNORMAL
LYMPHOCYTES # BLD AUTO: 0.49 K/UL — LOW (ref 1–3.3)
LYMPHOCYTES # BLD AUTO: 1.7 % — LOW (ref 13–44)
MANUAL SMEAR VERIFICATION: SIGNIFICANT CHANGE UP
MCHC RBC-ENTMCNC: 29.3 PG — SIGNIFICANT CHANGE UP (ref 27–34)
MCHC RBC-ENTMCNC: 32.7 GM/DL — SIGNIFICANT CHANGE UP (ref 32–36)
MCV RBC AUTO: 89.5 FL — SIGNIFICANT CHANGE UP (ref 80–100)
MONOCYTES # BLD AUTO: 1.75 K/UL — HIGH (ref 0–0.9)
MONOCYTES NFR BLD AUTO: 6.1 % — SIGNIFICANT CHANGE UP (ref 2–14)
MUCOUS THREADS # UR AUTO: PRESENT
NEUTROPHILS # BLD AUTO: 26.47 K/UL — HIGH (ref 1.8–7.4)
NEUTROPHILS NFR BLD AUTO: 92.2 % — HIGH (ref 43–77)
NITRITE UR-MCNC: NEGATIVE — SIGNIFICANT CHANGE UP
OVALOCYTES BLD QL SMEAR: SLIGHT — SIGNIFICANT CHANGE UP
PH UR: 5 — SIGNIFICANT CHANGE UP (ref 5–8)
PLAT MORPH BLD: NORMAL — SIGNIFICANT CHANGE UP
PLATELET # BLD AUTO: 190 K/UL — SIGNIFICANT CHANGE UP (ref 150–400)
POIKILOCYTOSIS BLD QL AUTO: SIGNIFICANT CHANGE UP
POLYCHROMASIA BLD QL SMEAR: SLIGHT — SIGNIFICANT CHANGE UP
POTASSIUM SERPL-MCNC: 3.1 MMOL/L — LOW (ref 3.5–5.3)
POTASSIUM SERPL-SCNC: 3.1 MMOL/L — LOW (ref 3.5–5.3)
PROT UR-MCNC: 100 MG/DL
RBC # BLD: 3.62 M/UL — LOW (ref 3.8–5.2)
RBC # FLD: 13.2 % — SIGNIFICANT CHANGE UP (ref 10.3–14.5)
RBC BLD AUTO: ABNORMAL
RBC CASTS # UR COMP ASSIST: 5 /HPF — HIGH (ref 0–4)
SODIUM SERPL-SCNC: 129 MMOL/L — LOW (ref 135–145)
SP GR SPEC: >1.03 — HIGH (ref 1–1.03)
SPHEROCYTES BLD QL SMEAR: SLIGHT — SIGNIFICANT CHANGE UP
SQUAMOUS # UR AUTO: 3 /HPF — SIGNIFICANT CHANGE UP (ref 0–5)
UROBILINOGEN FLD QL: 1 MG/DL — SIGNIFICANT CHANGE UP (ref 0.2–1)
WBC # BLD: 28.71 K/UL — HIGH (ref 3.8–10.5)
WBC # FLD AUTO: 28.71 K/UL — HIGH (ref 3.8–10.5)
WBC CLUMPS # UR AUTO: PRESENT
WBC UR QL: 409 /HPF — HIGH (ref 0–5)

## 2024-10-14 PROCEDURE — 93010 ELECTROCARDIOGRAM REPORT: CPT

## 2024-10-14 PROCEDURE — 99291 CRITICAL CARE FIRST HOUR: CPT

## 2024-10-14 PROCEDURE — 70450 CT HEAD/BRAIN W/O DYE: CPT | Mod: 26,MC,XU

## 2024-10-14 PROCEDURE — 70496 CT ANGIOGRAPHY HEAD: CPT | Mod: 26,MC

## 2024-10-14 PROCEDURE — 70498 CT ANGIOGRAPHY NECK: CPT | Mod: 26,MC

## 2024-10-14 PROCEDURE — 0042T: CPT | Mod: MC

## 2024-10-14 RX ORDER — SODIUM CHLORIDE 0.9 % (FLUSH) 0.9 %
1000 SYRINGE (ML) INJECTION ONCE
Refills: 0 | Status: COMPLETED | OUTPATIENT
Start: 2024-10-14 | End: 2024-10-14

## 2024-10-14 RX ORDER — ACETAMINOPHEN 325 MG
975 TABLET ORAL ONCE
Refills: 0 | Status: COMPLETED | OUTPATIENT
Start: 2024-10-14 | End: 2024-10-14

## 2024-10-14 RX ORDER — CEFTRIAXONE SODIUM 1 G
1000 VIAL (EA) INJECTION ONCE
Refills: 0 | Status: COMPLETED | OUTPATIENT
Start: 2024-10-14 | End: 2024-10-14

## 2024-10-14 RX ORDER — SODIUM CHLORIDE 0.9 % (FLUSH) 0.9 %
1550 SYRINGE (ML) INJECTION ONCE
Refills: 0 | Status: COMPLETED | OUTPATIENT
Start: 2024-10-14 | End: 2024-10-14

## 2024-10-14 RX ADMIN — Medication 100 MILLIGRAM(S): at 21:51

## 2024-10-14 RX ADMIN — Medication 975 MILLIGRAM(S): at 21:51

## 2024-10-14 RX ADMIN — Medication 40 MILLIEQUIVALENT(S): at 21:50

## 2024-10-14 RX ADMIN — Medication 1550 MILLILITER(S): at 21:55

## 2024-10-14 RX ADMIN — Medication 1000 MILLILITER(S): at 23:19

## 2024-10-14 RX ADMIN — Medication 100 MILLIEQUIVALENT(S): at 23:12

## 2024-10-14 NOTE — STROKE CODE NOTE - [facility] STROKE SCALE: 3. VISUAL, QM
making necessary changes now, attend support group, and re-visit the dietitian in one month.     Peter Graham RD (0) No visual loss

## 2024-10-14 NOTE — ED ADULT NURSE REASSESSMENT NOTE - NS ED NURSE REASSESS COMMENT FT1
Approx 2010, pt daughter @ bedside alerts charge RN of "L-sided arm drift." Pt UPGRADED to  Director,  Director @ bedside. STROKE CODE CALLED, stroke PA @ bedside. Pt transported to CT scan on monitor, primary RN @ bedside. Pending further ED & stroke workup.

## 2024-10-14 NOTE — CONSULT NOTE ADULT - SUBJECTIVE AND OBJECTIVE BOX
**STROKE CODE CONSULT NOTE**    Last known well time/Time of onset of symptoms: unknown    HPI: 84y Female with PMHx of HTN, HLD presents to ED for generalized weakness and fatigue x 1 week, per daughter was dx with UTI a week ago and was started on abx today. 2 hours later stroke code was called as ED provider noticed left upper extremity weakness. Per daughter she does not know when the patient started to develop LUE weakness. Daughter walked the patient at 430pm today and gait was normal. Then at 530pm pt became unsteady. NIHSS 2 for L NLFF and mild LUE weakness. CT imaging unremarkable. TNK was discussed however given mildly disabling sx and unclear when LUE weakness started TNK was deferred after discussion with Dr. Crews and Dr. Ring. Of note WBC 28, Na 129, trop 368, rectal temp 101.4.    T(C): 38.6 (10-14-24 @ 21:07), Max: 38.6 (10-14-24 @ 21:07)  HR: 110 (10-14-24 @ 18:35) (110 - 110)  BP: 109/67 (10-14-24 @ 18:35) (109/67 - 109/67)  RR: 16 (10-14-24 @ 18:35) (16 - 16)  SpO2: 95% (10-14-24 @ 18:35) (95% - 95%)    PAST MEDICAL & SURGICAL HISTORY:      FAMILY HISTORY:      SOCIAL HISTORY:   Patient lives alone  Smoking status: denies  Drinking: denies  Drug Use: denies    ROS:   see HPI    MEDICATIONS  (STANDING):  potassium chloride  10 mEq/100 mL IVPB 10 milliEquivalent(s) IV Intermittent every 1 hour  sodium chloride 0.9% Bolus 1000 milliLiter(s) IV Bolus once    MEDICATIONS  (PRN):    Allergies    No Known Allergies    Intolerances      Vital Signs Last 24 Hrs  T(C): 38.6 (14 Oct 2024 21:07), Max: 38.6 (14 Oct 2024 21:07)  T(F): 101.4 (14 Oct 2024 21:07), Max: 101.4 (14 Oct 2024 21:07)  HR: 110 (14 Oct 2024 18:35) (110 - 110)  BP: 109/67 (14 Oct 2024 18:35) (109/67 - 109/67)  BP(mean): --  RR: 16 (14 Oct 2024 18:35) (16 - 16)  SpO2: 95% (14 Oct 2024 18:35) (95% - 95%)    Parameters below as of 14 Oct 2024 18:35  Patient On (Oxygen Delivery Method): room air      Neurologic:  -Mental status: Awake, alert, oriented to person, place, and time. Speech is fluent with intact naming, repetition, and comprehension, no dysarthria. Recent and remote memory intact. Follows commands. Attention/concentration intact.   -Cranial nerves:   II: Visual fields are full to confrontation.  III, IV, VI: Extraocular movements are intact without nystagmus. Pupils equally round and reactive to light  V:  Facial sensation V1-V3 equal and intact   VII: L NLFF  VIII: Hearing is grossly intact.  Motor: Normal bulk and tone. RUE and bilateral LE 5/5. LUE 4+/5 with mild drift. Equal  strength.  Sensation: Intact to light touch bilaterally. No neglect or extinction on double simultaneous testing.  Coordination: No dysmetria on finger-to-nose bilaterally  Gait: Narrow gait, able to walk unassisted however at times would lean backwards.    NIHSS: 2  Fingerstick Blood Glucose: CAPILLARY BLOOD GLUCOSE        LABS:                        10.6   28.71 )-----------( 190      ( 14 Oct 2024 20:18 )             32.4     10-14    126[L]  |  92[L]  |  45[H]  ----------------------------<  110[H]  3.0[L]   |  23  |  1.42[H]    Ca    7.6[L]      14 Oct 2024 22:27  Mg     1.9     10-14      PT/INR - ( 14 Oct 2024 20:18 )   PT: 12.4 sec;   INR: 1.06          PTT - ( 14 Oct 2024 20:18 )  PTT:27.7 sec  CARDIAC MARKERS ( 14 Oct 2024 22:27 )  x     / x     / x     / x     / 13.2 ng/mL  CARDIAC MARKERS ( 14 Oct 2024 20:18 )  x     / x     / x     / x     / 12.2 ng/mL      Urinalysis Basic - ( 14 Oct 2024 22:27 )    Color: x / Appearance: x / SG: x / pH: x  Gluc: 110 mg/dL / Ketone: x  / Bili: x / Urobili: x   Blood: x / Protein: x / Nitrite: x   Leuk Esterase: x / RBC: x / WBC x   Sq Epi: x / Non Sq Epi: x / Bacteria: x        RADIOLOGY & ADDITIONAL STUDIES:  < from: CT Angio Neck Stroke Protocol w/ IV Cont (10.14.24 @ 20:58) >  IMPRESSION:    CT PERFUSION:  Technical limitations: None.    Core infarction: 0 ml  Penumbra / tissue at risk for active ischemia: 0 ml    CTA NECK:  No evidence of significant stenosis or occlusion.  1.1 cm right-sided thyroid nodule and additional subcentimeter sized   left-sided thyroid nodule. Recommend dedicated ultrasound.    CTA HEAD:  No large vessel occlusion, significant stenosis or vascular abnormality   identified.    < end of copied text >  < from: CT Brain Stroke Protocol (10.14.24 @ 20:47) >  IMPRESSION:  No acute intracranial hemorrhage, mass effect, or midline shift.    < end of copied text >      -----------------------------------------------------------------------------------------------------------------     TNK was discussed however given mildly disabling sx and unclear when LUE weakness started TNK was deferred after discussion with Dr. Crews and Dr. Ring    **STROKE CODE CONSULT NOTE**    Last known well time/Time of onset of symptoms: unknown    HPI: 84y Female with PMHx of HTN, HLD, alzheimers presents to ED for generalized weakness and fatigue x 1 week, per daughter was dx with UTI a week ago and was started on abx today. 2 hours later stroke code was called as ED provider noticed left upper extremity weakness. Per daughter she does not know when the patient started to develop LUE weakness. Daughter walked the patient at 430pm today and gait was normal. Then at 530pm pt became unsteady. NIHSS 2 for L NLFF and mild LUE weakness. CT imaging unremarkable. TNK was discussed however given mildly disabling sx and unclear when LUE weakness started TNK was deferred after discussion with Dr. Crews and Dr. Rnig. Of note WBC 28, Na 129, trop 368, rectal temp 101.4.    T(C): 38.6 (10-14-24 @ 21:07), Max: 38.6 (10-14-24 @ 21:07)  HR: 110 (10-14-24 @ 18:35) (110 - 110)  BP: 109/67 (10-14-24 @ 18:35) (109/67 - 109/67)  RR: 16 (10-14-24 @ 18:35) (16 - 16)  SpO2: 95% (10-14-24 @ 18:35) (95% - 95%)    PAST MEDICAL & SURGICAL HISTORY:      FAMILY HISTORY:      SOCIAL HISTORY:   Patient lives alone  Smoking status: denies  Drinking: denies  Drug Use: denies    ROS:   see HPI    MEDICATIONS  (STANDING):  potassium chloride  10 mEq/100 mL IVPB 10 milliEquivalent(s) IV Intermittent every 1 hour  sodium chloride 0.9% Bolus 1000 milliLiter(s) IV Bolus once    MEDICATIONS  (PRN):    Allergies    No Known Allergies    Intolerances      Vital Signs Last 24 Hrs  T(C): 38.6 (14 Oct 2024 21:07), Max: 38.6 (14 Oct 2024 21:07)  T(F): 101.4 (14 Oct 2024 21:07), Max: 101.4 (14 Oct 2024 21:07)  HR: 110 (14 Oct 2024 18:35) (110 - 110)  BP: 109/67 (14 Oct 2024 18:35) (109/67 - 109/67)  BP(mean): --  RR: 16 (14 Oct 2024 18:35) (16 - 16)  SpO2: 95% (14 Oct 2024 18:35) (95% - 95%)    Parameters below as of 14 Oct 2024 18:35  Patient On (Oxygen Delivery Method): room air      Neurologic:  -Mental status: Awake, alert, oriented to person, place, and time. Speech is fluent with intact naming, repetition, and comprehension, no dysarthria. Recent and remote memory intact. Follows commands. Attention/concentration intact.   -Cranial nerves:   II: Visual fields are full to confrontation.  III, IV, VI: Extraocular movements are intact without nystagmus. Pupils equally round and reactive to light  V:  Facial sensation V1-V3 equal and intact   VII: L NLFF  VIII: Hearing is grossly intact.  Motor: Normal bulk and tone. RUE and bilateral LE 5/5. LUE 4+/5 with mild drift. Equal  strength.  Sensation: Intact to light touch bilaterally. No neglect or extinction on double simultaneous testing.  Coordination: No dysmetria on finger-to-nose bilaterally  Gait: Narrow gait, able to walk unassisted however at times would lean backwards.    NIHSS: 2  Fingerstick Blood Glucose: CAPILLARY BLOOD GLUCOSE        LABS:                        10.6   28.71 )-----------( 190      ( 14 Oct 2024 20:18 )             32.4     10-14    126[L]  |  92[L]  |  45[H]  ----------------------------<  110[H]  3.0[L]   |  23  |  1.42[H]    Ca    7.6[L]      14 Oct 2024 22:27  Mg     1.9     10-14      PT/INR - ( 14 Oct 2024 20:18 )   PT: 12.4 sec;   INR: 1.06          PTT - ( 14 Oct 2024 20:18 )  PTT:27.7 sec  CARDIAC MARKERS ( 14 Oct 2024 22:27 )  x     / x     / x     / x     / 13.2 ng/mL  CARDIAC MARKERS ( 14 Oct 2024 20:18 )  x     / x     / x     / x     / 12.2 ng/mL      Urinalysis Basic - ( 14 Oct 2024 22:27 )    Color: x / Appearance: x / SG: x / pH: x  Gluc: 110 mg/dL / Ketone: x  / Bili: x / Urobili: x   Blood: x / Protein: x / Nitrite: x   Leuk Esterase: x / RBC: x / WBC x   Sq Epi: x / Non Sq Epi: x / Bacteria: x        RADIOLOGY & ADDITIONAL STUDIES:  < from: CT Angio Neck Stroke Protocol w/ IV Cont (10.14.24 @ 20:58) >  IMPRESSION:    CT PERFUSION:  Technical limitations: None.    Core infarction: 0 ml  Penumbra / tissue at risk for active ischemia: 0 ml    CTA NECK:  No evidence of significant stenosis or occlusion.  1.1 cm right-sided thyroid nodule and additional subcentimeter sized   left-sided thyroid nodule. Recommend dedicated ultrasound.    CTA HEAD:  No large vessel occlusion, significant stenosis or vascular abnormality   identified.    < end of copied text >  < from: CT Brain Stroke Protocol (10.14.24 @ 20:47) >  IMPRESSION:  No acute intracranial hemorrhage, mass effect, or midline shift.    < end of copied text >      -----------------------------------------------------------------------------------------------------------------     TNK was discussed however given mildly disabling sx and unclear when LUE weakness started TNK was deferred after discussion with Dr. Crews and Dr. Ring    **STROKE CODE CONSULT NOTE**    Last known well time/Time of onset of symptoms: unknown    HPI: 84y Female with PMHx of HTN, HLD, alzheimers presents to ED for generalized weakness and fatigue x 1 week, per daughter was dx with UTI a week ago and was started on abx today. 2 hours later stroke code was called as daughter noticed left upper extremity weakness. Per daughter she does not know when the patient started to develop LUE weakness, states could have been ongoing for the past week. Daughter walked the patient at 430pm today and gait was normal. Then at 530pm pt became unsteady. NIHSS 2 for L NLFF and mild LUE weakness. CT imaging unremarkable. TNK was discussed however given mildly disabling sx and unclear when LUE weakness started TNK was deferred after discussion with Dr. Crews and Dr. Ring. Of note WBC 28, Na 129, trop 368, rectal temp 101.4.    T(C): 38.6 (10-14-24 @ 21:07), Max: 38.6 (10-14-24 @ 21:07)  HR: 110 (10-14-24 @ 18:35) (110 - 110)  BP: 109/67 (10-14-24 @ 18:35) (109/67 - 109/67)  RR: 16 (10-14-24 @ 18:35) (16 - 16)  SpO2: 95% (10-14-24 @ 18:35) (95% - 95%)    PAST MEDICAL & SURGICAL HISTORY:      FAMILY HISTORY:      SOCIAL HISTORY:   Patient lives alone  Smoking status: denies  Drinking: denies  Drug Use: denies    ROS:   see HPI    MEDICATIONS  (STANDING):  potassium chloride  10 mEq/100 mL IVPB 10 milliEquivalent(s) IV Intermittent every 1 hour  sodium chloride 0.9% Bolus 1000 milliLiter(s) IV Bolus once    MEDICATIONS  (PRN):    Allergies    No Known Allergies    Intolerances      Vital Signs Last 24 Hrs  T(C): 38.6 (14 Oct 2024 21:07), Max: 38.6 (14 Oct 2024 21:07)  T(F): 101.4 (14 Oct 2024 21:07), Max: 101.4 (14 Oct 2024 21:07)  HR: 110 (14 Oct 2024 18:35) (110 - 110)  BP: 109/67 (14 Oct 2024 18:35) (109/67 - 109/67)  BP(mean): --  RR: 16 (14 Oct 2024 18:35) (16 - 16)  SpO2: 95% (14 Oct 2024 18:35) (95% - 95%)    Parameters below as of 14 Oct 2024 18:35  Patient On (Oxygen Delivery Method): room air      Neurologic:  -Mental status: Awake, alert, oriented to person, place, and time. Speech is fluent with intact naming, repetition, and comprehension, no dysarthria. Recent and remote memory intact. Follows commands. Attention/concentration intact.   -Cranial nerves:   II: Visual fields are full to confrontation.  III, IV, VI: Extraocular movements are intact without nystagmus. Pupils equally round and reactive to light  V:  Facial sensation V1-V3 equal and intact   VII: L NLFF  VIII: Hearing is grossly intact.  Motor: Normal bulk and tone. RUE and bilateral LE 5/5. LUE 4+/5 with mild drift. Equal  strength.  Sensation: Intact to light touch bilaterally. No neglect or extinction on double simultaneous testing.  Coordination: No dysmetria on finger-to-nose bilaterally  Gait: Narrow gait, able to walk unassisted however at times would lean backwards.    NIHSS: 2  Fingerstick Blood Glucose: CAPILLARY BLOOD GLUCOSE        LABS:                        10.6   28.71 )-----------( 190      ( 14 Oct 2024 20:18 )             32.4     10-14    126[L]  |  92[L]  |  45[H]  ----------------------------<  110[H]  3.0[L]   |  23  |  1.42[H]    Ca    7.6[L]      14 Oct 2024 22:27  Mg     1.9     10-14      PT/INR - ( 14 Oct 2024 20:18 )   PT: 12.4 sec;   INR: 1.06          PTT - ( 14 Oct 2024 20:18 )  PTT:27.7 sec  CARDIAC MARKERS ( 14 Oct 2024 22:27 )  x     / x     / x     / x     / 13.2 ng/mL  CARDIAC MARKERS ( 14 Oct 2024 20:18 )  x     / x     / x     / x     / 12.2 ng/mL      Urinalysis Basic - ( 14 Oct 2024 22:27 )    Color: x / Appearance: x / SG: x / pH: x  Gluc: 110 mg/dL / Ketone: x  / Bili: x / Urobili: x   Blood: x / Protein: x / Nitrite: x   Leuk Esterase: x / RBC: x / WBC x   Sq Epi: x / Non Sq Epi: x / Bacteria: x        RADIOLOGY & ADDITIONAL STUDIES:  < from: CT Angio Neck Stroke Protocol w/ IV Cont (10.14.24 @ 20:58) >  IMPRESSION:    CT PERFUSION:  Technical limitations: None.    Core infarction: 0 ml  Penumbra / tissue at risk for active ischemia: 0 ml    CTA NECK:  No evidence of significant stenosis or occlusion.  1.1 cm right-sided thyroid nodule and additional subcentimeter sized   left-sided thyroid nodule. Recommend dedicated ultrasound.    CTA HEAD:  No large vessel occlusion, significant stenosis or vascular abnormality   identified.    < end of copied text >  < from: CT Brain Stroke Protocol (10.14.24 @ 20:47) >  IMPRESSION:  No acute intracranial hemorrhage, mass effect, or midline shift.    < end of copied text >      -----------------------------------------------------------------------------------------------------------------     TNK was discussed however given mildly disabling sx and unclear when LUE weakness started TNK was deferred after discussion with Dr. Crews and Dr. Ring

## 2024-10-14 NOTE — ED PROVIDER NOTE - CLINICAL SUMMARY MEDICAL DECISION MAKING FREE TEXT BOX
Pt brought for weakness, chills w recent dx of uti and new onset of dragging leg and LUE pronator drift.  Stroke code called after my eval (1.5 h after pt arrived); ? cva vs infection exacerbating underlying neuro issues.  Concern for uti given recent + ua and delay to abx starting.  Plan labs, ct/cta/ct perfusion, stroke eval; reassess. See progress notes for further mdm related documentation.

## 2024-10-14 NOTE — ED PROVIDER NOTE - PHYSICAL EXAMINATION
VITAL SIGNS: I have reviewed nursing notes and confirm.  CONSTITUTIONAL:  in no acute distress.   SKIN:  warm and dry, no acute rash.   HEAD:  normocephalic, atraumatic.  EYES: PERRL, EOM intact; conjunctiva and sclera clear.  ENT: No nasal discharge; airway clear.   NECK: Supple; non tender.  CARD: S1, S2 normal; no murmurs, gallops, or rubs. Regular rate and rhythm.   RESP:  Clear to auscultation b/l, no wheezes, rales or rhonchi.  ABD: Normal bowel sounds; soft; non-distended; non-tender; no guarding/ rebound.  MSK: Normal ROM. No clubbing, cyanosis or edema. no ttp bilat le  NEURO: awake, alert, oriented x 3, CN II-XII grossly intact, motor 5/5, no gross sens deficits, speech clear, + L pronator drift  PSYCH: Cooperative, mood and affect appropriate.

## 2024-10-14 NOTE — ED ADULT NURSE NOTE - OBJECTIVE STATEMENT
Pt is a 83y/o F w/ PMHx HTN, Alzheimer's (recently started on Aricept), presenting to the ED w/ c/o of weakness, fatigue, chills, 1x episode emesis today, "not acting like herself" x1week, recently dx w/ UTI @ PCP (denies S/S). Upon assessment, pt denies fevers, CP/SOB, abd pain, N/V/D, dizziness/lightheadedness, urinary symptoms, recent falls @ home. Pt alert/oriented to self/time/situation, cannot state month. Pt speaking in clear/complete sentences. Respirations easy/even and unlabored on RA. Pt ambulates independently w/ steady gait. Pt placed in gown, resting comfortably in stretcher, no acute distress. Pending ED provider eval.

## 2024-10-14 NOTE — ED ADULT TRIAGE NOTE - CHIEF COMPLAINT QUOTE
Weakness. Pt recently started on UTI medication this morning. Per children, pt confused and not making sense at home. A&Ox4. Recent alzheimer's diagnosis

## 2024-10-14 NOTE — ED PROVIDER NOTE - CARE PLAN
Principal Discharge DX:	Sepsis  Secondary Diagnosis:	Acute UTI  Secondary Diagnosis:	CVA (cerebrovascular accident)  Secondary Diagnosis:	Demand ischemia   1

## 2024-10-14 NOTE — ED PROVIDER NOTE - PROGRESS NOTE DETAILS
Pt w neg stroke eval; stroke team concerned for stroke and recommend mri but feel pt needs med admit so prefer to follow as consult.  Labs notable for high wbc, low na, low k, ephraim (prior cr 0.8 per outpt labs).  Pt spiked fever in ed - given tylenol, sepsis protocol initiated at 9p; empiric abx for presumed uti and ivf given.  Trop elevated - ekg w/o stemi and no c/o cp; discussed pt w Dr Guadalupe (pt's friend and card) - he feels pt's trop likely demand ischemia and that pt does not need card tele bed.  Pt initially discussed w hospitalist for hosp tele bed since declined for card tele; however, pt noted to have low bp mid 80's so rediscussed and declined by hosp tele.  ICU consulted for step down bed.  Rpt labs w similar na, k, uptrending trop.   Await icu eval.  Pt signed out to Dr Stroud and KOBE Garcia. BP still low after ivf.  Card fellow contacted for bedside echo to eval for wall motion abnl.  Pt denies cp.  Rpt ekg pending as well. BP still low after ivf.  Card fellow contacted for bedside echo to eval for wall motion abnl.  Pt denies cp.  Rpt ekg pending as well.  Pressors ordered.  Pt accepted to icu. Pt w neg stroke eval; stroke team concerned for stroke and recommend mri but feel pt needs med admit so prefer to follow as consult.  Labs notable for high wbc, low na, low k, ephraim (prior cr 0.8 per outpt labs).  Pt spiked fever in ed - given tylenol, sepsis protocol initiated at 9p; empiric abx for presumed uti and ivf given.  Trop elevated - ekg w/o stemi and no c/o cp; discussed pt w Dr Guadalupe (pt's friend and card) - he feels pt's trop likely demand ischemia and that pt does not need card tele bed.  Pt initially discussed w hospitalist for hosp tele bed since declined for card tele; however, pt noted to have low bp mid 80's so rediscussed and declined by hosp tele.  ICU consulted for step down bed.  Rpt labs w similar na, k, uptrending trop.   Await icu eval.

## 2024-10-14 NOTE — ED PROVIDER NOTE - OBJECTIVE STATEMENT
84-year-old female history of Alzheimer's, hypertension, hld, brought by daughter for complaints of chills and generalized weakness starting today.  Daughter reports that patient was diagnosed with UTI after having urinary frequency symptoms last week.  Patient received 2 doses of the antibiotics starting this week.  Daughter also noted onset of patient dragging her leg when she was trying to walk after being unable to get out of the bath at around 4:30 PM.  Daughter is also noted onset of left arm drift since being in the ER.  Patient denies headache, change in vision or speech, daughter does not report facial droop at time of my evaluation.  Patient denies chest pain, palpitations, shortness of breath.  Patient denies abdominal pain, nausea, vomiting, fever, , flank pain.

## 2024-10-14 NOTE — CONSULT NOTE ADULT - ASSESSMENT
84y Female with PMHx of HTN, HLD presents to ED for generalized weakness and fatigue x 1 week, was dx with UTI a week ago and was started on abx today. 2 hours later stroke code was called as ED provider noticed left upper extremity weakness. Unclear when LUE weakness developed. Daughter walked the patient at 430pm today and gait was normal. Then at 530pm pt became unsteady. NIHSS 2 for L NLFF and mild LUE weakness. CT imaging unremarkable. TNK was discussed however given mildly disabling sx and unclear when LUE weakness started TNK was deferred after discussion with Dr. Crews and Dr. Ring. Of note WBC 28, Na 129, trop 368, rectal temp 101.4. Recommend admission to medicine given lab abnormalities. Stroke to follow.     1. Secondary stroke prevention  - c.w home aspirin 81mg for now    2. Stroke risk factors  - A1C: pls obtain  - LDL: pls obtain  - TSH: pls obtain  - HTN, HLD    3. Further management  - Obtain MRI brain without  - Recommend SBP goal <180  - Recommend q4hr stroke neuro checks  - May need outpt neurology follow up  - Provide stroke education    Discussed with Neurology Attendings Dr. Crews and Dr. Ring 84y Female with PMHx of HTN, HLD, alzheimers presents to ED for generalized weakness and fatigue x 1 week, was dx with UTI a week ago and was started on abx today. 2 hours later stroke code was called as ED provider noticed left upper extremity weakness. Unclear when LUE weakness developed. Daughter walked the patient at 430pm today and gait was normal. Then at 530pm pt became unsteady. NIHSS 2 for L NLFF and mild LUE weakness. CT imaging unremarkable. TNK was discussed however given mildly disabling sx and unclear when LUE weakness started TNK was deferred after discussion with Dr. Crews and Dr. Ring. Of note WBC 28, Na 129, trop 368, rectal temp 101.4. Recommend admission to medicine given lab abnormalities. Stroke to follow.     1. Secondary stroke prevention  - c.w home aspirin 81mg for now    2. Stroke risk factors  - A1C: pls obtain  - LDL: pls obtain  - TSH: pls obtain  - HTN, HLD    3. Further management  - Obtain MRI brain without  - Recommend SBP goal <180  - Recommend q4hr stroke neuro checks  - May need outpt neurology follow up  - Provide stroke education    Discussed with Neurology Attendings Dr. Crews and Dr. Ring 84y Female with PMHx of HTN, HLD, alzheimers presents to ED for generalized weakness and fatigue x 1 week, was dx with UTI a week ago and was started on abx today. 2 hours later stroke code was called as daughter noticed left upper extremity weakness. Unclear when LUE weakness developed. Daughter walked the patient at 430pm today and gait was normal. Then at 530pm pt became unsteady. NIHSS 2 for L NLFF and mild LUE weakness. CT imaging unremarkable. TNK was discussed however given mildly disabling sx and unclear when LUE weakness started TNK was deferred after discussion with Dr. Crews and Dr. Ring. Of note WBC 28, Na 129, trop 368, rectal temp 101.4. Recommend admission to medicine given lab abnormalities. Stroke to follow.     1. Secondary stroke prevention  - c.w home aspirin 81mg for now    2. Stroke risk factors  - A1C: pls obtain  - LDL: pls obtain  - TSH: pls obtain  - HTN, HLD    3. Further management  - Obtain MRI brain without  - Recommend SBP goal <180  - Recommend q4hr stroke neuro checks  - May need outpt neurology follow up  - Provide stroke education    Discussed with Neurology Attendings Dr. Crews and Dr. Ring

## 2024-10-15 ENCOUNTER — RESULT REVIEW (OUTPATIENT)
Age: 84
End: 2024-10-15

## 2024-10-15 ENCOUNTER — INPATIENT (INPATIENT)
Facility: HOSPITAL | Age: 84
LOS: 2 days | Discharge: HOME HEALTH | DRG: 871 | End: 2024-10-18
Attending: STUDENT IN AN ORGANIZED HEALTH CARE EDUCATION/TRAINING PROGRAM | Admitting: INTERNAL MEDICINE
Payer: MEDICARE

## 2024-10-15 DIAGNOSIS — R53.81 OTHER MALAISE: ICD-10-CM

## 2024-10-15 DIAGNOSIS — Z51.5 ENCOUNTER FOR PALLIATIVE CARE: ICD-10-CM

## 2024-10-15 DIAGNOSIS — Z71.89 OTHER SPECIFIED COUNSELING: ICD-10-CM

## 2024-10-15 DIAGNOSIS — G30.9 ALZHEIMER'S DISEASE, UNSPECIFIED: ICD-10-CM

## 2024-10-15 DIAGNOSIS — A41.9 SEPSIS, UNSPECIFIED ORGANISM: ICD-10-CM

## 2024-10-15 LAB
ACANTHOCYTES BLD QL SMEAR: SIGNIFICANT CHANGE UP
ADD ON TEST-SPECIMEN IN LAB: SIGNIFICANT CHANGE UP
ADD ON TEST-SPECIMEN IN LAB: SIGNIFICANT CHANGE UP
ALBUMIN SERPL ELPH-MCNC: 2.8 G/DL — LOW (ref 3.3–5)
ALP SERPL-CCNC: 119 U/L — SIGNIFICANT CHANGE UP (ref 40–120)
ALT FLD-CCNC: 38 U/L — SIGNIFICANT CHANGE UP (ref 10–45)
AMPHET UR-MCNC: NEGATIVE — SIGNIFICANT CHANGE UP
ANION GAP SERPL CALC-SCNC: 11 MMOL/L — SIGNIFICANT CHANGE UP (ref 5–17)
ANISOCYTOSIS BLD QL: SLIGHT — SIGNIFICANT CHANGE UP
AST SERPL-CCNC: 67 U/L — HIGH (ref 10–40)
BARBITURATES UR SCN-MCNC: NEGATIVE — SIGNIFICANT CHANGE UP
BASOPHILS # BLD AUTO: 0 K/UL — SIGNIFICANT CHANGE UP (ref 0–0.2)
BASOPHILS NFR BLD AUTO: 0 % — SIGNIFICANT CHANGE UP (ref 0–2)
BENZODIAZ UR-MCNC: NEGATIVE — SIGNIFICANT CHANGE UP
BILIRUB SERPL-MCNC: 1 MG/DL — SIGNIFICANT CHANGE UP (ref 0.2–1.2)
BUN SERPL-MCNC: 36 MG/DL — HIGH (ref 7–23)
BURR CELLS BLD QL SMEAR: PRESENT — SIGNIFICANT CHANGE UP
CALCIUM SERPL-MCNC: 7.6 MG/DL — LOW (ref 8.4–10.5)
CHLORIDE SERPL-SCNC: 99 MMOL/L — SIGNIFICANT CHANGE UP (ref 96–108)
CK MB CFR SERPL CALC: 15 NG/ML — HIGH (ref 0–6.7)
CK SERPL-CCNC: 355 U/L — HIGH (ref 25–170)
CO2 SERPL-SCNC: 19 MMOL/L — LOW (ref 22–31)
COCAINE METAB.OTHER UR-MCNC: NEGATIVE — SIGNIFICANT CHANGE UP
CREAT ?TM UR-MCNC: 63 MG/DL — SIGNIFICANT CHANGE UP
CREAT SERPL-MCNC: 1.28 MG/DL — SIGNIFICANT CHANGE UP (ref 0.5–1.3)
EGFR: 42 ML/MIN/1.73M2 — LOW
EOSINOPHIL # BLD AUTO: 0 K/UL — SIGNIFICANT CHANGE UP (ref 0–0.5)
EOSINOPHIL NFR BLD AUTO: 0 % — SIGNIFICANT CHANGE UP (ref 0–6)
FENTANYL UR QL SCN: NEGATIVE — SIGNIFICANT CHANGE UP
GIANT PLATELETS BLD QL SMEAR: PRESENT — SIGNIFICANT CHANGE UP
GLUCOSE SERPL-MCNC: 110 MG/DL — HIGH (ref 70–99)
HCT VFR BLD CALC: 38.6 % — SIGNIFICANT CHANGE UP (ref 34.5–45)
HGB BLD-MCNC: 12.7 G/DL — SIGNIFICANT CHANGE UP (ref 11.5–15.5)
LEGIONELLA AG UR QL: NEGATIVE — SIGNIFICANT CHANGE UP
LYMPHOCYTES # BLD AUTO: 0.63 K/UL — LOW (ref 1–3.3)
LYMPHOCYTES # BLD AUTO: 1.7 % — LOW (ref 13–44)
MAGNESIUM SERPL-MCNC: 1.8 MG/DL — SIGNIFICANT CHANGE UP (ref 1.6–2.6)
MAGNESIUM SERPL-MCNC: 3 MG/DL — HIGH (ref 1.6–2.6)
MANUAL SMEAR VERIFICATION: SIGNIFICANT CHANGE UP
MCHC RBC-ENTMCNC: 30.3 PG — SIGNIFICANT CHANGE UP (ref 27–34)
MCHC RBC-ENTMCNC: 32.9 GM/DL — SIGNIFICANT CHANGE UP (ref 32–36)
MCV RBC AUTO: 92.1 FL — SIGNIFICANT CHANGE UP (ref 80–100)
METHADONE UR-MCNC: NEGATIVE — SIGNIFICANT CHANGE UP
MICROCYTES BLD QL: SLIGHT — SIGNIFICANT CHANGE UP
MONOCYTES # BLD AUTO: 1.64 K/UL — HIGH (ref 0–0.9)
MONOCYTES NFR BLD AUTO: 4.4 % — SIGNIFICANT CHANGE UP (ref 2–14)
NEUTROPHILS # BLD AUTO: 34.66 K/UL — HIGH (ref 1.8–7.4)
NEUTROPHILS NFR BLD AUTO: 90.4 % — HIGH (ref 43–77)
NEUTS BAND # BLD: 2.6 % — SIGNIFICANT CHANGE UP (ref 0–8)
OPIATES UR-MCNC: NEGATIVE — SIGNIFICANT CHANGE UP
OSMOLALITY SERPL: 284 MOSM/KG — SIGNIFICANT CHANGE UP (ref 280–301)
OSMOLALITY UR: 322 MOSM/KG — SIGNIFICANT CHANGE UP (ref 300–900)
OVALOCYTES BLD QL SMEAR: SLIGHT — SIGNIFICANT CHANGE UP
PCP SPEC-MCNC: SIGNIFICANT CHANGE UP
PCP UR-MCNC: NEGATIVE — SIGNIFICANT CHANGE UP
PHOSPHATE SERPL-MCNC: 2.4 MG/DL — LOW (ref 2.5–4.5)
PHOSPHATE SERPL-MCNC: 2.7 MG/DL — SIGNIFICANT CHANGE UP (ref 2.5–4.5)
PLAT MORPH BLD: ABNORMAL
PLATELET # BLD AUTO: 210 K/UL — SIGNIFICANT CHANGE UP (ref 150–400)
POIKILOCYTOSIS BLD QL AUTO: SIGNIFICANT CHANGE UP
POLYCHROMASIA BLD QL SMEAR: SLIGHT — SIGNIFICANT CHANGE UP
POTASSIUM SERPL-MCNC: 4 MMOL/L — SIGNIFICANT CHANGE UP (ref 3.5–5.3)
POTASSIUM SERPL-SCNC: 4 MMOL/L — SIGNIFICANT CHANGE UP (ref 3.5–5.3)
PROT SERPL-MCNC: 6.1 G/DL — SIGNIFICANT CHANGE UP (ref 6–8.3)
RAPID RVP RESULT: DETECTED
RBC # BLD: 4.19 M/UL — SIGNIFICANT CHANGE UP (ref 3.8–5.2)
RBC # FLD: 13.3 % — SIGNIFICANT CHANGE UP (ref 10.3–14.5)
RBC BLD AUTO: ABNORMAL
RV+EV RNA SPEC QL NAA+PROBE: DETECTED
S PNEUM AG UR QL: NEGATIVE — SIGNIFICANT CHANGE UP
SARS-COV-2 RNA SPEC QL NAA+PROBE: SIGNIFICANT CHANGE UP
SODIUM SERPL-SCNC: 129 MMOL/L — LOW (ref 135–145)
SODIUM UR-SCNC: <20 MMOL/L — SIGNIFICANT CHANGE UP
THC UR QL: NEGATIVE — SIGNIFICANT CHANGE UP
TROPONIN T, HIGH SENSITIVITY RESULT: 301 NG/L — CRITICAL HIGH (ref 0–51)
VARIANT LYMPHS # BLD: 0.9 % — SIGNIFICANT CHANGE UP (ref 0–6)
WBC # BLD: 37.27 K/UL — HIGH (ref 3.8–10.5)
WBC # FLD AUTO: 37.27 K/UL — HIGH (ref 3.8–10.5)

## 2024-10-15 PROCEDURE — 93308 TTE F-UP OR LMTD: CPT | Mod: 26

## 2024-10-15 PROCEDURE — 93306 TTE W/DOPPLER COMPLETE: CPT | Mod: 26

## 2024-10-15 PROCEDURE — 76604 US EXAM CHEST: CPT | Mod: 26

## 2024-10-15 PROCEDURE — 99291 CRITICAL CARE FIRST HOUR: CPT

## 2024-10-15 PROCEDURE — 99233 SBSQ HOSP IP/OBS HIGH 50: CPT | Mod: GC,25

## 2024-10-15 PROCEDURE — 99223 1ST HOSP IP/OBS HIGH 75: CPT

## 2024-10-15 RX ORDER — NOREPINEPHRINE BITARTRATE/D5W 16MG/250ML
0.05 PLASTIC BAG, INJECTION (ML) INTRAVENOUS
Qty: 8 | Refills: 0 | Status: DISCONTINUED | OUTPATIENT
Start: 2024-10-15 | End: 2024-10-15

## 2024-10-15 RX ORDER — CHLORHEXIDINE GLUCONATE ORAL RINSE 1.2 MG/ML
1 SOLUTION DENTAL
Refills: 0 | Status: DISCONTINUED | OUTPATIENT
Start: 2024-10-15 | End: 2024-10-17

## 2024-10-15 RX ORDER — ASPIRIN 325 MG
81 TABLET ORAL DAILY
Refills: 0 | Status: DISCONTINUED | OUTPATIENT
Start: 2024-10-15 | End: 2024-10-18

## 2024-10-15 RX ORDER — POTASSIUM PHOSPHATE, MONOBASIC POTASSIUM PHOSPHATE, DIBASIC 224; 236 MG/ML; MG/ML
15 INJECTION, SOLUTION, CONCENTRATE INTRAVENOUS ONCE
Refills: 0 | Status: DISCONTINUED | OUTPATIENT
Start: 2024-10-15 | End: 2024-10-15

## 2024-10-15 RX ORDER — MAGNESIUM SULFATE 500 MG/ML
2 VIAL (ML) INJECTION ONCE
Refills: 0 | Status: COMPLETED | OUTPATIENT
Start: 2024-10-15 | End: 2024-10-15

## 2024-10-15 RX ORDER — AZITHROMYCIN 250 MG/1
TABLET, FILM COATED ORAL
Refills: 0 | Status: DISCONTINUED | OUTPATIENT
Start: 2024-10-15 | End: 2024-10-15

## 2024-10-15 RX ORDER — CEFTRIAXONE SODIUM 1 G
1000 VIAL (EA) INJECTION EVERY 24 HOURS
Refills: 0 | Status: DISCONTINUED | OUTPATIENT
Start: 2024-10-15 | End: 2024-10-15

## 2024-10-15 RX ORDER — SODIUM PHOSPHATE IN D5W 15MMOL/250
15 PLASTIC BAG, INJECTION (ML) INTRAVENOUS ONCE
Refills: 0 | Status: DISCONTINUED | OUTPATIENT
Start: 2024-10-15 | End: 2024-10-15

## 2024-10-15 RX ORDER — AZITHROMYCIN 250 MG/1
500 TABLET, FILM COATED ORAL EVERY 24 HOURS
Refills: 0 | Status: DISCONTINUED | OUTPATIENT
Start: 2024-10-15 | End: 2024-10-15

## 2024-10-15 RX ORDER — CEFTRIAXONE SODIUM 1 G
2000 VIAL (EA) INJECTION EVERY 24 HOURS
Refills: 0 | Status: DISCONTINUED | OUTPATIENT
Start: 2024-10-15 | End: 2024-10-18

## 2024-10-15 RX ORDER — SOD PHOS DI, MONO/K PHOS MONO 250 MG
2 TABLET ORAL ONCE
Refills: 0 | Status: COMPLETED | OUTPATIENT
Start: 2024-10-15 | End: 2024-10-15

## 2024-10-15 RX ADMIN — Medication 4.64 MICROGRAM(S)/KG/MIN: at 00:45

## 2024-10-15 RX ADMIN — Medication 81 MILLIGRAM(S): at 12:32

## 2024-10-15 RX ADMIN — AZITHROMYCIN 500 MILLIGRAM(S): 250 TABLET, FILM COATED ORAL at 10:00

## 2024-10-15 RX ADMIN — Medication 100 MILLIEQUIVALENT(S): at 01:45

## 2024-10-15 RX ADMIN — Medication 5000 UNIT(S): at 06:31

## 2024-10-15 RX ADMIN — Medication 5000 UNIT(S): at 16:19

## 2024-10-15 RX ADMIN — Medication 25 GRAM(S): at 02:49

## 2024-10-15 RX ADMIN — Medication 100 MILLIEQUIVALENT(S): at 00:46

## 2024-10-15 RX ADMIN — Medication 100 MILLIGRAM(S): at 12:32

## 2024-10-15 RX ADMIN — Medication 2 PACKET(S): at 09:21

## 2024-10-15 RX ADMIN — CHLORHEXIDINE GLUCONATE ORAL RINSE 1 APPLICATION(S): 1.2 SOLUTION DENTAL at 06:31

## 2024-10-15 NOTE — CONSULT NOTE ADULT - SUBJECTIVE AND OBJECTIVE BOX
HPI:  84F PMH recent Alzheimers diagnosis, HTN, mild AI presenting with 2 week duration of fatigue, malaise with associated increased urinary frequency, urgency, and incontinence. Patient's daughter and HCP, Rain, is present at bedside providing a majority of the history. States she has progressively been getting weaker w worsening urinary frequency that eventually led to an episode of urinary incontinence. When daughter came to visit patient today, pt was weak to the point of not being able to get out of the bath tub prompting her to come in to ED. UTI was diagnosed outpatient ~1 week ago but there was a delay in getting medication so did not start taking augmentin until 10/14. Reports pt has poor PO intake for past 5 days, unsteady gait, occasional fever/chills but otherwise denies falls, N/V/D, hematuria, numbness/tingling, headache/vision changes. Diagnosis of Alzheimer's after recent neurocognitive testing and pt was started on donepezil in September, which daughter stopped because she felt it was making her condition worse.     Of note, patient was noted by patient's daughter to have "L arm weakness and drift" and subsequent stroke code was called, imaging was negative for acute process and decision was made for no intervention.     ED:   VS: T 101.4, , /67, RR 16, 95% on RA   Labs: WBC 28, H/H 10.6/32.4, Plts 190, Na 129, K 3.1, BUN/Cr 48/1.46, Trop 368, lactate 1.0, UA+ spec grav >1.03, mod leuk est, blood, 409 wbcs, 27 casts,   Imaging: CTA head/neck unremarkable for acute process, stenosis, ischemia, or hemorrhage  Intervention: 1.55L NS bolus, K 40 meq PO, 10 meq x3 IV, CTX 1g, Tylenol     Home Medications:  atorvastatin 40 mg oral tablet qd   hydroCHLOROthiazide 12.5 mg oral capsule every otherwise (MWF)   lisinopril 20 mg oral tablet qd   ASA 81 mg QD (15 Oct 2024 01:29)    PERTINENT PM/SXH:   Alzheimer disease    HTN (hypertension)    HLD (hyperlipidemia)        FAMILY HISTORY:  No history of dementia in first degree relatives according to chart  Unable to obtain due to patient's encephalopathy    ITEMS NOT CHECKED ARE NOT PRESENT  SOCIAL HISTORY:   Significant other/partner:  []  Children:  [X] 6 children  Substance hx:  []   Tobacco hx:  []   Alcohol hx: []   Home Opioid hx:  [] I-Stop Reference No:  - no active Rx's / see chart note  Living Situation: [X]Home  []Long term care  []Rehab []Other  Druze/Spiritual practice: ; Role of organized Catholic [ ] important [ ] some [ ] unable to assess  Coping: [] well [] with difficulty [] poor coping [] unable to assess  Support system: [] strong [] adequate [] inadequate    ADVANCE DIRECTIVES:    []MOLST  []Living Will  DECISION MAKER(s):  [X] Health Care Proxy(s)  [] Surrogate(s)  [] Guardian           Name(s)/Phone Number(s): Dr. Rain Mendiola (daughter)    BASELINE (I)ADLs (prior to admission):  Berks: []Total  [] Moderate []Dependent    ALLERGIES:  No Known Allergies    MEDICATIONS  (STANDING):  aspirin  chewable 81 milliGRAM(s) Oral daily  cefTRIAXone   IVPB 2000 milliGRAM(s) IV Intermittent every 24 hours  chlorhexidine 2% Cloths 1 Application(s) Topical <User Schedule>  heparin   Injectable 5000 Unit(s) SubCutaneous every 12 hours    MEDICATIONS  (PRN):    Analgesic Use (Scheduled and PRNs) for past 24 hours:  acetaminophen     Tablet ..   975 milliGRAM(s) Oral (10-14-24 @ 21:51)      PRESENT SYMPTOMS/REVIEW OF SYSTEMS: []Unable to obtain due to poor mentation/encephalopathy  Source if other than patient:  []Family   []Team     Pain: [] yes [X] no  QOL Impact -   Location -                    Aggravating Factors -  Quality -  Radiation -  Timing -  Severity (0-10 scale) -   Minimal Acceptable Level (0-10 scale) -    CPOT Score:  (Pain Assessment Scale for Critically Ill)    PAIN AD Score:  (Nonverbal Pain Assessment Scale)    Dyspnea:                           []Mild  []Moderate []Severe  Anxiety:                             []Mild []Moderate []Severe  Fatigue:                             []Mild []Moderate []Severe  Nausea:                             []Mild []Moderate []Severe  Loss of Appetite:              []Mild []Moderate []Severe  Constipation:                    []Mild []Moderate []Severe    Other Symptoms:  [x]All Other Review Of Systems Negative     Palliative Performance Status Version 2: 50 %  (Functional Assessment Tool)    PHYSICAL EXAM:  GENERAL:  [X]Alert  []Oriented x   []Lethargic  []Cachexia  []Unarousable  [X]Verbal  []Non-Verbal  Behavioral:   []Anxiety  []Delirium []Agitation [X]Cooperative  HEENT:  [X]Normal   []Dry mouth   []ET Tube/Trach  []Oral lesions  PULMONARY:   [X]Clear []Tachypnea  []Audible excessive secretions  []Normal Work of Breathing []Labored Breathing  []Rhonchi []Crackles []Wheezing  CARDIOVASCULAR:    [X]Regular Rate & Rhythm []Irregular []Tachy  []Benigno  GASTROINTESTINAL:  [X]Soft  []Distended   []+BS  [X]Non tender []Tender  []PEG []OGT/ NGT  Last BM:  GENITOURINARY:  [X]Normal [] Incontinent   []Oliguria/Anuria   []Rojas  MUSCULOSKELETAL:   []Normal   [X]Weakness  []Bed/Wheelchair bound []Edema  NEUROLOGIC:   [X]No focal deficits  []Cognitive impairment  []Dysphagia []Dysarthria []Paresis []Encephalopathic  SKIN:   [X]Normal   []Pressure ulcer(s)  []Rash    CRITICAL CARE:  []Shock Present  []Septic []Cardiogenic []Neurologic []Hypovolemic  []Vasopressors []Inotropes   []Respiratory failure present []Mechanical Ventilation []Non-invasive ventilatory support []High-Flow  []Acute  []Chronic []Hypoxic  []Hypercarbic  []Other organ failure    Vital Signs Last 24 Hrs  T(C): 37 (15 Oct 2024 14:14), Max: 38.6 (14 Oct 2024 21:07)  T(F): 98.6 (15 Oct 2024 14:14), Max: 101.4 (14 Oct 2024 21:07)  HR: 78 (15 Oct 2024 16:00) (63 - 110)  BP: 105/58 (15 Oct 2024 16:00) (80/52 - 156/76)  BP(mean): 78 (15 Oct 2024 16:00) (69 - 106)  RR: 20 (15 Oct 2024 16:00) (16 - 28)  SpO2: 96% (15 Oct 2024 16:00) (92% - 99%)    Parameters below as of 15 Oct 2024 16:00  Patient On (Oxygen Delivery Method): room air        LABS:                        12.7   37.27 )-----------( 210      ( 15 Oct 2024 05:30 )             38.6   10-15    129[L]  |  99  |  36[H]  ----------------------------<  110[H]  4.0   |  19[L]  |  1.28    Ca    7.6[L]      15 Oct 2024 05:30  Phos  2.4     10-15  Mg     3.0     10-15    TPro  6.1  /  Alb  2.8[L]  /  TBili  1.0  /  DBili  x   /  AST  67[H]  /  ALT  38  /  AlkPhos  119  10-15    RADIOLOGY & ADDITIONAL STUDIES:  < from: CT Brain Stroke Protocol (10.14.24 @ 20:47) >  INTERPRETATION:  CLINICAL INFORMATION: Stroke Code left-sided weakness.    COMPARISON: None.    CONTRAST:  IV Contrast: NONE  Complications: None reported at time of study completion    TECHNIQUE:  Serial axial images were obtained from the skull base to the   vertex using multi-slice helical technique. Sagittal and coronal   reformats were obtained.    FINDINGS:    VENTRICLES AND SULCI: Normal in size and configuration.  INTRA-AXIAL: No mass effect, acute hemorrhage, or midline shift.  There   are periventricular and subcortical white matter hypodensities,   consistent with microvascular type changes.  EXTRA-AXIAL: No massor fluid collection. Basal cisterns are normal in   appearance. Dural calcification is seen along the left paramedian   parietal convexity.    VISUALIZED SINUSES:  Clear.  TYMPANOMASTOID CAVITIES:  Clear.  VISUALIZED ORBITS: Normal.  CALVARIUM: Intact.      IMPRESSION:  No acute intracranial hemorrhage, mass effect, or midline shift.      < end of copied text >      REFERRALS:  [x]Social Work  [X]Case management []PT/OT []Chaplaincy  []Hospice  []Patient/Family Support []Massage Therapy []Music Therapy    DISCUSSION OF CASE: Family - obtained additional history and to provide emotional support

## 2024-10-15 NOTE — PATIENT PROFILE ADULT - FOOD INSECURITY
Telephone Encounter by Sadia Newman CMA at 07/11/18 10:37 AM     Author:  Sadia Newman CMA Service:  (none) Author Type:  Certified Medical Assistant     Filed:  07/11/18 10:41 AM Encounter Date:  7/11/2018 Status:  Signed     :  Sadia Newman CMA (Certified Medical Assistant)            Message routed to Dr. Lainez as [email or web address].[[email or web address]]      Revision History        User Key Date/Time User Provider Type Action    > CH1.1 07/11/18 10:41 AM Sadia Newman CMA Certified Medical Assistant Sign    M - Manual             no

## 2024-10-15 NOTE — PROGRESS NOTE ADULT - SUBJECTIVE AND OBJECTIVE BOX
Neurology Stroke Progress Note    INTERVAL HPI/OVERNIGHT EVENTS:  Patient seen and examined. Daughters were at bedside. Discussed plan for MRI inpatient to r/o cerebellar stroke and family in agreement. Patient was being evaluated by PT at the time of visit.     MEDICATIONS  (STANDING):  aspirin  chewable 81 milliGRAM(s) Oral daily  cefTRIAXone   IVPB 2000 milliGRAM(s) IV Intermittent every 24 hours  chlorhexidine 2% Cloths 1 Application(s) Topical <User Schedule>  heparin   Injectable 5000 Unit(s) SubCutaneous every 12 hours    MEDICATIONS  (PRN):      Allergies    No Known Allergies    Intolerances        Vital Signs Last 24 Hrs  T(C): 37.2 (15 Oct 2024 17:35), Max: 38.6 (14 Oct 2024 21:07)  T(F): 99 (15 Oct 2024 17:35), Max: 101.4 (14 Oct 2024 21:07)  HR: 89 (15 Oct 2024 18:00) (63 - 105)  BP: 119/58 (15 Oct 2024 18:00) (80/52 - 156/76)  BP(mean): 82 (15 Oct 2024 18:00) (69 - 106)  RR: 20 (15 Oct 2024 18:00) (17 - 44)  SpO2: 94% (15 Oct 2024 18:00) (92% - 99%)    Parameters below as of 15 Oct 2024 18:00  Patient On (Oxygen Delivery Method): room air        Physical exam:  General: No acute distress, awake and alert  Eyes: Anicteric sclerae, moist conjunctivae, see below for CNs  Neck: trachea midline,  Pulmonary: No use of accessory muscles  Extremities: no edema    Neurologic:  -Mental status: Awake, alert, oriented to person, place, and time. Speech is fluent with intact naming, repetition, and comprehension, no dysarthria. Recent and remote memory intact. Follows commands. Attention/concentration intact. Fund of knowledge appropriate.  -Cranial nerves:   II: Visual fields are full to confrontation.  III, IV, VI: Extraocular movements are intact without nystagmus. Pupils equally round and reactive to light  V:  Facial sensation V1-V3 equal and intact   VII: Face is symmetric with smile  VIII: Hearing grossly intact to voice   Motor: Normal bulk and tone. No pronator drift. LUE/LLE 4/5 with drift on the arm. RUE/RLE 5/5  Sensation: Intact to light touch bilaterally. No neglect or extinction on double simultaneous testing.  Coordination: Mild left sided dysmetria on finger-to-nose   Gait: walks with walker, leans to the left while walking. Narrow based gait.     LABS:                        12.7   37.27 )-----------( 210      ( 15 Oct 2024 05:30 )             38.6     10-15    129[L]  |  99  |  36[H]  ----------------------------<  110[H]  4.0   |  19[L]  |  1.28    Ca    7.6[L]      15 Oct 2024 05:30  Phos  2.4     10-15  Mg     3.0     10-15    TPro  6.1  /  Alb  2.8[L]  /  TBili  1.0  /  DBili  x   /  AST  67[H]  /  ALT  38  /  AlkPhos  119  10-15    PT/INR - ( 14 Oct 2024 20:18 )   PT: 12.4 sec;   INR: 1.06          PTT - ( 14 Oct 2024 20:18 )  PTT:27.7 sec  Urinalysis Basic - ( 15 Oct 2024 05:30 )    Color: x / Appearance: x / SG: x / pH: x  Gluc: 110 mg/dL / Ketone: x  / Bili: x / Urobili: x   Blood: x / Protein: x / Nitrite: x   Leuk Esterase: x / RBC: x / WBC x   Sq Epi: x / Non Sq Epi: x / Bacteria: x        RADIOLOGY & ADDITIONAL TESTS:    < from: CT Brain Stroke Protocol (10.14.24 @ 20:47) >  IMPRESSION:  No acute intracranial hemorrhage, mass effect, or midline shift.    < end of copied text >  < from: CT Brain Perfusion Maps Stroke (10.14.24 @ 20:56) >  IMPRESSION:    CT PERFUSION:  Technical limitations: None.    Core infarction: 0 ml  Penumbra / tissue at risk for active ischemia: 0 ml    CTA NECK:  No evidence of significant stenosis or occlusion.  1.1 cm right-sided thyroid nodule and additional subcentimeter sized   left-sided thyroid nodule. Recommend dedicated ultrasound.    CTA HEAD:  No large vessel occlusion, significant stenosis or vascular abnormality   identified.    < end of copied text >

## 2024-10-15 NOTE — PROGRESS NOTE ADULT - ASSESSMENT
84F PMH recent Alzheimers diagnosis, HTN, mild AI presenting with 2 week duration of fatigue, malaise with associated increased urinary frequency, urgency, and incontinence, ICU consulted for hypotension with concern for septic shock 2/2 UTI     NEURO  #L. sided pronator drift vs TIA   #Rule out stroke   -MRI per stroke   -Stroke recs   -Q4H neuro checks   -Continue ASA    #Alzheimer's dementia   recent diagnosis after undergoing neurocognitive testing, started on donepezil 09/2024 but discontinued by daughter after she felt mental status was worsening. currently AOx2 on exam which is baseline mental status per daughter   -Delirium precautions   -Holding home donepezil   -Avoid sedating agents     CARDS  #Septic vs hypovolemic shock   WBC 28, febrile to 101 iso grossly +UA (>400 WBC, mod leuk est) w/ refractory hypotension; MAPs remained 60-65 despite adequate fluid resuscitation 2.55L NS bolus; overall well appearing, well perfused and mentating; suspect will require short course of vasopressors iso UTI   Bedside limited TTE completed in ED: EF ~45-50% , no effusion, no sig wall motion abnormalities   -Maintain MAP >70 if possible given recent possible CVA  -F/u ucx, bcx   -Wean vasopressors as tolerated   -Formal TTE   -CTX 1 g QD. Adjust antibiotics to culture data as appropriate     #HTN   home meds: lisinopril 20 mg QD, HCTZ   -Holding home meds iso shock. Resume as appropriate     #Elevated troponin  No CP prior to admission or now, but up trending troponin 368->449. EKG without ischemic changes; cardiology contacted in ED, likely 2/2 demand ischemia and cardiology admission not warranted; no significant wall motion abnormalities on limited TTE in ED as described above   -Trend tropes to peak   -Serial EKGs   -Formal TTE in AM     PULM  RIAZ    GI   RIAZ    RENAL  #NERIS  Baseline Cr 0.74 documented from 07/2024. on ACEi and with superimposed sepsis 2/2 UTI at this time likely contributing to kidney injury. Hypovolemic on exam likely prerenal etiology iso poor PO intake for past 5 days, Cr improved s/p fluids   -Urine lytes   -Trend BUN/Cr   -Holding nephrotoxic meds including home lisinopril   -RP US to r/o hydro     #Hyponatremia   Na 129-> 126 on admission after 1.55L NS bolus, asymptomatic at this time    Clinically hypovolemic on exam w/ poor PO intake for past 5 days iso generalized weakness and malaise iso septic and hypovolemic shock 2/2 UTI; suspect this is hypovolemic hypo-osmolar hyponatremia, pending further urine studies and osmolality for further assessment; pt additionally on diuretic with poor PO intake which may have contributed to hypovolemic hyponatremia   -F/u urine lytes   -Q4H BMPs with urine lytes     HEME   RIAZ    ID   #UTI   Grossly +UA as described above with urinary frequency/urgency   -F/u ucx, bcx  -Bladder scan to r/o retention   -RP US   -CTX 1 g QD. Adjust antibiotics to culture data as appropriate     #Enterovirus  Asymptomatic, no resp. complaints   -Supportive care    PROPHYLAXIS  Dvt ppx: SCD, heparin SubQ  PPI ppx: None  Diet: Regular   Dispo: MICU  Activity: Bedrest   84F PMH recent Alzheimers diagnosis, HTN, mild AI presenting with 2 week duration of fatigue, malaise with associated increased urinary frequency, urgency, and incontinence, ICU consulted for hypotension with concern for septic shock 2/2 UTI. Patient noted to have improvements in their hemodynamics, noted to no longer require pressors. Notable new consolidation on bedside pocus, evaluating for PNA as an additional source of infection.     NEURO  #L. sided pronator drift vs TIA   #Rule out stroke   - consider MRI   -Stroke recs: obtain TSH, LDL, A1c am 10/16  -Q4H neuro checks   -Continue ASA 81mg     #Alzheimer's dementia   recent diagnosis after undergoing neurocognitive testing, started on donepezil 09/2024 but discontinued by daughter after she felt mental status was worsening. currently AOx2 on exam which is baseline mental status per daughter   -Delirium precautions   -Holding home donepezil   -Avoid sedating agents     CARDS  #Septic vs hypovolemic shock   WBC 28, febrile to 101 iso grossly +UA (>400 WBC, mod leuk est) w/ refractory hypotension; MAPs remained 60-65 despite adequate fluid resuscitation 2.55L NS bolus; overall well appearing, well perfused and mentating; suspect will require short course of vasopressors iso UTI   Bedside limited TTE completed in ED: EF ~45-50% , no effusion, no sig wall motion abnormalities   -Maintain MAP >70 if possible given recent possible CVA  currently off of vasopressors   -f/u blood cultures  -f/u sputum cultures  -Formal TTE   -CTX 2 g QD. Adjust antibiotics to culture data as appropriate     #HTN   home meds: lisinopril 20 mg QD, HCTZ   -Holding home meds iso shock. Resume as appropriate     #Elevated troponin  No CP prior to admission or now, but up trending troponin 368->449. EKG without ischemic changes; cardiology contacted in ED, likely 2/2 demand ischemia and cardiology admission not warranted; no significant wall motion abnormalities on limited TTE in ED as described above   -Trend tropes to peak   -Serial EKGs   -Formal TTE in AM     PULM  #PNA  Noted to have consolidation on bedside US  strep AG negative and legionella ag negative  Plan:   -CTX 2g q24h    GI   RIAZ    RENAL  #NERIS  Baseline Cr 0.74 documented from 07/2024. on ACEi and with superimposed sepsis 2/2 UTI at this time likely contributing to kidney injury. Hypovolemic on exam likely prerenal etiology iso poor PO intake for past 5 days, Cr improved s/p fluids   - Urine lytes   - Trend BUN/Cr   - Holding nephrotoxic meds including home lisinopril   - RP US to r/o hydro     #Hyponatremia   Na 129-> 126 on admission after 1.55L NS bolus, asymptomatic at this time    Clinically hypovolemic on exam w/ poor PO intake for past 5 days iso generalized weakness and malaise iso septic and hypovolemic shock 2/2 UTI; suspect this is hypovolemic hypo-osmolar hyponatremia, pending further urine studies and osmolality for further assessment; pt additionally on diuretic with poor PO intake which may have contributed to hypovolemic hyponatremia   -F/u urine lytes   -Q4H BMPs with urine lytes     HEME   RIAZ    ID   #septic shock 2/2 UTI   Grossly +UA as described above with urinary frequency/urgency   -F/u ucx, bcx  -Bladder scan to r/o retention   -RP US   -CTX 2g QD. Adjust antibiotics to culture data as appropriate     #Enterovirus  Asymptomatic, no resp. complaints   -Supportive care    PROPHYLAXIS  Dvt ppx: SCD, heparin SubQ  PPI ppx: None  Diet: Regular   Dispo: MICU  Activity: Bedrest

## 2024-10-15 NOTE — PHYSICAL THERAPY INITIAL EVALUATION ADULT - PREDICTED DURATION OF THERAPY (DAYS/WKS), PT EVAL
Pt.. would benefit from continued pT f/u to improve strength, balance, coordination, endurance, functional mobility,

## 2024-10-15 NOTE — CONSULT NOTE ADULT - PROBLEM SELECTOR RECOMMENDATION 5
Following for GOC. Not ready to make any decisions regarding advanced directives at this time. Will sign off. Please reconsult for any clinical changes warranting further goals of care.

## 2024-10-15 NOTE — PROGRESS NOTE ADULT - ASSESSMENT
84F PMH recent Alzheimers diagnosis, HTN, mild AI presenting with 2 week duration of fatigue, malaise with associated increased urinary frequency, urgency, and incontinence. UA +WBC, found to have leukocytosis at admission (28.71). Patient's daughter noticed L arm weakness and drift, which prompted a stroke code. No stroke acute intervention since imaging negative and onset of weakness was unknown, stroke following as consult.     CT head showed: No acute hemorrhage or stroke   CTA/CTP no LVO, stenosis, dissection. No penumbra/tissue at risk for active ischemia   Initial NIHSS: 2    1)Secondary stroke prevention  - continue ASA 81mg PO daily     2) Further management  - obtain MRI brain without to r/o cerebellar stroke  - provide stroke education    Case discussed with Dr. Ring

## 2024-10-15 NOTE — H&P ADULT - ASSESSMENT
84F PMH recent Alzheimers diagnosis, HTN, mild AI presenting with 2 week duration of fatigue, malaise with associated increased urinary frequency, urgency, and incontinence, ICU consulted for hypotension with concern for septic shock 2/2 UTI     NEURO  #L. sided pronator drift vs TIA   #Rule out stroke   -MRI per stroke   -Stroke recs   -Q4H neuro checks   -Continue ASA    #Alzheimer's dementia   recent diagnosis after undergoing neurocognitive testing, started on donepezil 09/2024 but discontinued by daughter after she felt mental status was worsening. currently AOx2 on exam which is baseline mental status per daughter   -Delirium precautions   -Holding home donepezil   -Avoid sedating agents     CARDS  #Septic vs hypovolemic shock   WBC 28, febrile to 101 iso grossly +UA (>400 WBC, mod leuk est) w/ refractory hypotension; MAPs remained 60-65 despite adequate fluid resuscitation 2.55L NS bolus; overall well appearing, well perfused and mentating; suspect will require short course of vasopressors iso UTI   Bedside limited TTE completed in ED: EF ~45-50% , no effusion, no sig wall motion abnormalities   -Maintain MAP >70 if possible given recent possible CVA  -F/u ucx, bcx   -Wean vasopressors as tolerated   -Formal TTE   -CTX 1 g QD. Adjust antibiotics to culture data as appropriate     #HTN   home meds: lisinopril 20 mg QD, HCTZ   -Holding home meds iso shock. Resume as appropriate     #Elevated troponin  No CP prior to admission or now, but up trending troponin 368->449. EKG without ischemic changes; cardiology contacted in ED, likely 2/2 demand ischemia and cardiology admission not warranted; no significant wall motion abnormalities on limited TTE in ED as described above   -Trend tropes to peak   -Serial EKGs   -Formal TTE in AM     PULM  RIAZ    GI   RIAZ    RENAL  #NERIS  Baseline Cr 0.74 documented from 07/2024. on ACEi and with superimposed sepsis 2/2 UTI at this time likely contributing to kidney injury. Hypovolemic on exam likely prerenal etiology iso poor PO intake for past 5 days, Cr improved s/p fluids   -Urine lytes   -Trend BUN/Cr   -Holding nephrotoxic meds including home lisinopril   -RP US to r/o hydro     #Hyponatremia   Na 129-> 126 on admission after 1.55L NS bolus, asymptomatic at this time    Clinically hypovolemic on exam w/ poor PO intake for past 5 days iso generalized weakness and malaise iso septic and hypovolemic shock 2/2 UTI; suspect this is hypovolemic hypo-osmolar hyponatremia, pending further urine studies and osmolality for further assessment; pt additionally on diuretic with poor PO intake which may have contributed to hypovolemic hyponatremia   -F/u urine lytes   -Q4H BMPs with urine lytes     HEME   RIAZ    ID   #UTI   Grossly +UA as described above with urinary frequency/urgency   -F/u ucx, bcx  -Bladder scan to r/o retention   -RP US   -CTX 1 g QD. Adjust antibiotics to culture data as appropriate     PROPHYLAXIS  Dvt ppx: SCD, heparin SubQ  PPI ppx: None  Diet:   Dispo: MICU  Activity: Bedrest   84F PMH recent Alzheimers diagnosis, HTN, mild AI presenting with 2 week duration of fatigue, malaise with associated increased urinary frequency, urgency, and incontinence, ICU consulted for hypotension with concern for septic shock 2/2 UTI     NEURO  #L. sided pronator drift vs TIA   #Rule out stroke   -MRI per stroke   -Stroke recs   -Q4H neuro checks   -Continue ASA    #Alzheimer's dementia   recent diagnosis after undergoing neurocognitive testing, started on donepezil 09/2024 but discontinued by daughter after she felt mental status was worsening. currently AOx2 on exam which is baseline mental status per daughter   -Delirium precautions   -Holding home donepezil   -Avoid sedating agents     CARDS  #Septic vs hypovolemic shock   WBC 28, febrile to 101 iso grossly +UA (>400 WBC, mod leuk est) w/ refractory hypotension; MAPs remained 60-65 despite adequate fluid resuscitation 2.55L NS bolus; overall well appearing, well perfused and mentating; suspect will require short course of vasopressors iso UTI   Bedside limited TTE completed in ED: EF ~45-50% , no effusion, no sig wall motion abnormalities   -Maintain MAP >70 if possible given recent possible CVA  -F/u ucx, bcx   -Wean vasopressors as tolerated   -Formal TTE   -CTX 1 g QD. Adjust antibiotics to culture data as appropriate     #HTN   home meds: lisinopril 20 mg QD, HCTZ   -Holding home meds iso shock. Resume as appropriate     #Elevated troponin  No CP prior to admission or now, but up trending troponin 368->449. EKG without ischemic changes; cardiology contacted in ED, likely 2/2 demand ischemia and cardiology admission not warranted; no significant wall motion abnormalities on limited TTE in ED as described above   -Trend tropes to peak   -Serial EKGs   -Formal TTE in AM     PULM  RIAZ    GI   RIAZ    RENAL  #NERIS  Baseline Cr 0.74 documented from 07/2024. on ACEi and with superimposed sepsis 2/2 UTI at this time likely contributing to kidney injury. Hypovolemic on exam likely prerenal etiology iso poor PO intake for past 5 days, Cr improved s/p fluids   -Urine lytes   -Trend BUN/Cr   -Holding nephrotoxic meds including home lisinopril   -RP US to r/o hydro     #Hyponatremia   Na 129-> 126 on admission after 1.55L NS bolus, asymptomatic at this time    Clinically hypovolemic on exam w/ poor PO intake for past 5 days iso generalized weakness and malaise iso septic and hypovolemic shock 2/2 UTI; suspect this is hypovolemic hypo-osmolar hyponatremia, pending further urine studies and osmolality for further assessment; pt additionally on diuretic with poor PO intake which may have contributed to hypovolemic hyponatremia   -F/u urine lytes   -Q4H BMPs with urine lytes     HEME   RIAZ    ID   #UTI   Grossly +UA as described above with urinary frequency/urgency   -F/u ucx, bcx  -Bladder scan to r/o retention   -RP US   -CTX 1 g QD. Adjust antibiotics to culture data as appropriate     #Enterovirus  Asymptomatic, no resp. complaints   -Supportive care    PROPHYLAXIS  Dvt ppx: SCD, heparin SubQ  PPI ppx: None  Diet: Regular   Dispo: MICU  Activity: Bedrest

## 2024-10-15 NOTE — H&P ADULT - HISTORY OF PRESENT ILLNESS
84F PMH recent Alzheimers diagnosis, HTN, mild AI presenting with 2 week duration of fatigue, malaise with associated increased urinary frequency, urgency, and incontinence. Patient's daughter and HCP, Rain, is present at bedside providing a majority of the history. States she has progressively been getting weaker w worsening urinary frequency that eventually led to an episode of urinary incontinence. When daughter came to visit patient today, pt was weak to the point of not being able to get out of the bath tub prompting her to come in to ED. UTI was diagnosed outpatient ~1 week ago but there was a delay in getting medication so did not start taking augmentin until 10/14. Reports pt has poor PO intake for past 5 days, unsteady gait, occasional fever/chills but otherwise denies falls, N/V/D, hematuria, numbness/tingling, headache/vision changes. Diagnosis of Alzheimer's after recent neurocognitive testing and pt was started on donepezil in September, which daughter stopped because she felt it was making her condition worse.     Of note, patient was noted by patient's daughter to have "L arm weakness and drift" and subsequent stroke code was called, imaging was negative for acute process and decision was made for no intervention.     ED:   VS: T 101.4, , /67, RR 16, 95% on RA   Labs: WBC 28, H/H 10.6/32.4, Plts 190, Na 129, K 3.1, BUN/Cr 48/1.46, Trop 368, lactate 1.0, UA+ spec grav >1.03, mod leuk est, blood, 409 wbcs, 27 casts,   Imaging: CTA head/neck unremarkable for acute process, stenosis, ischemia, or hemorrhage  Intervention: 1.55L NS bolus, K 40 meq PO, 10 meq x3 IV, CTX 1g, Tylenol     Home Medications:  atorvastatin 40 mg oral tablet qd   hydroCHLOROthiazide 12.5 mg oral capsule every otherwise (MWF)   lisinopril 20 mg oral tablet qd   ASA 81 mg QD

## 2024-10-15 NOTE — PHYSICAL THERAPY INITIAL EVALUATION ADULT - PERTINENT HX OF CURRENT PROBLEM, REHAB EVAL
Pt. is an 84 y.o R hand dominant female presenting with 2 weeks of fatigue, malaise, increased urinary frequency/urgency . Pt's daughter additionally reports new onset unsteadiness of gait and L arm weakness.    Stroke code called in ED: CT head negative for acute pathology, CTA - negative for LVO. MRI -pending; + pronator drift LUE in ED . Pt. pending further w/u.  ICU consulted for hypotension i/s/o sepsis due to UTI.

## 2024-10-15 NOTE — DIETITIAN INITIAL EVALUATION ADULT - OTHER CALCULATIONS
Cologuard results was received and abstracted and was send to scan
Needs determined using current body weight 49.4 kg as no ht value available to determined ideal body weight adjusted for elderly repletion.

## 2024-10-15 NOTE — H&P ADULT - NSHPPHYSICALEXAM_GEN_ALL_CORE
General: NAD, comfortable in bed   HEENT:  NC/AT, discoloration of R. upper cheek s/p recent Mohs surgery, dry mucous membranes, EOMI and FAM   Lungs: CTAB, decreased air entry at bilateral bases   Cardiovascular: RRR, nl s1, s2, no m/r/g appreciated  Gastrointestinal: protuberant with umbilical hernia but abdomen soft, NTND, +BS  Musculoskeletal: No clubbing.  Moves all extremities.    Skin: Warm, dry, intact. No rashes noted. Poor skin turgor   Neurological: A&Ox2, strength 5/5 and sensation intact in all extremities except for mildly decreased R  strength

## 2024-10-15 NOTE — PHYSICAL THERAPY INITIAL EVALUATION ADULT - ADDITIONAL COMMENTS
Pt reports elevator access with 1 step to enter. Pt. has been previously independent w/o assistive device; no h/o falls; no current home care.

## 2024-10-15 NOTE — CONSULT NOTE ADULT - ASSESSMENT
83 yo F with Alzheimer's dementia, HTN, mild AI presenting with 2 week duration of fatigue, malaise with associated increased urinary frequency, urgency, and incontinence. Admitted to ICU for septic shock 2/2 UTI. Palliative consulted for help with complex medical decision making.

## 2024-10-15 NOTE — PHYSICAL THERAPY INITIAL EVALUATION ADULT - ASR WT BEARING STATUS EVAL
Triage Note: Patient is coming in with failure to thrive for the past 3 months. Patient has stopped eating. Patient was seen at GI last week and wants to do certain tests but has not been scheduled yet. Patient has history of dementia and does not speak much. Patient is now unable to stand or walk.
no weight-bearing restrictions

## 2024-10-15 NOTE — CONSULT NOTE ADULT - SUBJECTIVE AND OBJECTIVE BOX
ICU Consult Note:       ED course  Vitals:   Labs:  CXR:  EKG:   Interventions:  Consults:    PAST MEDICAL & SURGICAL HISTORY:  Alzheimer disease  HTN (hypertension)  HLD (hyperlipidemia)  AI    Home Medications:  atorvastatin 40 mg oral tablet: 1 tab(s) orally (14 Oct 2024 23:24)  hydroCHLOROthiazide 12.5 mg oral capsule every otherwise (MWF)   lisinopril 20 mg oral tablet: 1 tab(s) orally (14 Oct 2024 23:24)    MEDICATIONS  (STANDING):  norepinephrine Infusion 0.05 MICROgram(s)/kG/Min (4.64 mL/Hr) IV Continuous <Continuous>  potassium chloride  10 mEq/100 mL IVPB 10 milliEquivalent(s) IV Intermittent every 1 hour    MEDICATIONS  (PRN):      Allergies  No Known Allergies    SOCIAL HX: Lives alone in [facility] apartment, able to complete own ADLs, never smoker, no etoh use     PHYSICAL EXAM:    ICU Vital Signs Last 24 Hrs  T(C): 38.6 (14 Oct 2024 21:07), Max: 38.6 (14 Oct 2024 21:07)  T(F): 101.4 (14 Oct 2024 21:07), Max: 101.4 (14 Oct 2024 21:07)  HR: 78 (14 Oct 2024 23:20) (78 - 110)  BP: 80/52 (14 Oct 2024 23:20) (80/52 - 109/67)  BP(mean): --  ABP: --  ABP(mean): --  RR: 17 (14 Oct 2024 22:30) (16 - 17)  SpO2: 95% (14 Oct 2024 22:30) (95% - 95%)    O2 Parameters below as of 14 Oct 2024 22:30  Patient On (Oxygen Delivery Method): room air            General: NC/AT, lying in bed   HEENT:  NC/AT, EOMI, sclera anicteric, PERRL       Lymphatic system: No LN  Lungs: Bilateral BS  Cardiovascular: RRR, nl s1, s2, no m/r/g appreciated  Gastrointestinal: abdomen soft, NTND, bowel sounds present  Musculoskeletal: No clubbing.  Moves all extremities.    Skin: Warm, dry, intact. No rashes noted.  Neurological: A&Ox3, strength 5/5 and sensation intact in all extremities         LABS:                          10.6   28.71 )-----------( 190      ( 14 Oct 2024 20:18 )             32.4                                               10-14    126[L]  |  92[L]  |  45[H]  ----------------------------<  110[H]  3.0[L]   |  23  |  1.42[H]    Ca    7.6[L]      14 Oct 2024 22:27  Mg     1.9     10-14        PT/INR - ( 14 Oct 2024 20:18 )   PT: 12.4 sec;   INR: 1.06          PTT - ( 14 Oct 2024 20:18 )  PTT:27.7 sec                                       Urinalysis Basic - ( 14 Oct 2024 22:27 )    Color: x / Appearance: x / SG: x / pH: x  Gluc: 110 mg/dL / Ketone: x  / Bili: x / Urobili: x   Blood: x / Protein: x / Nitrite: x   Leuk Esterase: x / RBC: x / WBC x   Sq Epi: x / Non Sq Epi: x / Bacteria: x        CARDIAC MARKERS ( 14 Oct 2024 22:27 )  x     / x     / x     / x     / 13.2 ng/mL  CARDIAC MARKERS ( 14 Oct 2024 20:18 )  x     / x     / x     / x     / 12.2 ng/mL                                                                                                                                                                           ICU Consult Note:   84F PMH recent Alzheimers diagnosis, HTN, mild AI presenting with 2 week duration of fatigue, malaise with associated increased urinary frequency, urgency, and incontinence. Patient's daughter and HCP, Rain, is present at bedside providing a majority of the history. States she has progressively been getting weaker w worsening urinary frequency that eventually led to an episode of urinary incontinence. When daughter came to visit patient today, pt was weak to the point of not being able to get out of the bath tub prompting her to come in to ED. UTI was diagnosed outpatient ~1 week ago but there was a delay in getting medication so did not start taking augmentin until 10/14. Reports pt has poor PO intake for past 5 days, unsteady gait, occasional fever/chills but otherwise denies falls, N/V/D, hematuria, numbness/tingling, headache/vision changes. Diagnosis of Alzheimer's after recent neurocognitive testing and pt was started on donepezil in September, which daughter stopped because she felt it was making her condition worse.     Of note, patient was noted by patient's daughter to have "L arm weakness and drift" and subsequent stroke code was called, imaging was negative for acute process and decision was made for no intervention.     ED:   VS: T 101.4, , /67, RR 16, 95% on RA   Labs: WBC 28, H/H 10.6/32.4, Plts 190, Na 129, K 3.1, BUN/Cr 48/1.46, Trop 368, lactate 1.0, UA+ spec grav >1.03, mod leuk est, blood, 409 wbcs, 27 casts,   Imaging: CTA head/neck unremarkable for acute process, stenosis, ischemia, or hemorrhage  Intervention: 1.55L NS bolus, K 40 meq PO, 10 meq x3 IV, CTX 1g, Tylenol       PAST MEDICAL & SURGICAL HISTORY:  Alzheimer disease  HTN (hypertension)  HLD (hyperlipidemia)  AI    Home Medications:  atorvastatin 40 mg oral tablet qd   hydroCHLOROthiazide 12.5 mg oral capsule every otherwise (MWF)   lisinopril 20 mg oral tablet qd   ASA 81 mg QD     Allergies  No Known Allergies    SOCIAL HX: Lives alone in [facility] apartment, able to complete own ADLs, never smoker, no etoh use     PHYSICAL EXAM:  General: NAD, comfortable in bed   HEENT:  NC/AT, discoloration of R. upper cheek s/p recent Mohs surgery, dry mucous membranes, EOMI and FAM   Lungs: CTAB, decreased air entry at bilateral bases   Cardiovascular: RRR, nl s1, s2, no m/r/g appreciated  Gastrointestinal: protuberant with umbilical hernia but abdomen soft, NTND, +BS  Musculoskeletal: No clubbing.  Moves all extremities.    Skin: Warm, dry, intact. No rashes noted. Poor skin turgor   Neurological: A&Ox2, strength 5/5 and sensation intact in all extremities except for mildly decreased R  strength     LABS:                        10.6   28.71 )-----------( 190      ( 14 Oct 2024 20:18 )             32.4                                               10-14    126[L]  |  92[L]  |  45[H]  ----------------------------<  110[H]  3.0[L]   |  23  |  1.42[H]    Ca    7.6[L]      14 Oct 2024 22:27  Mg     1.9     10-14        PT/INR - ( 14 Oct 2024 20:18 )   PT: 12.4 sec;   INR: 1.06          PTT - ( 14 Oct 2024 20:18 )  PTT:27.7 sec                                       Urinalysis Basic - ( 14 Oct 2024 22:27 )    Color: x / Appearance: x / SG: x / pH: x  Gluc: 110 mg/dL / Ketone: x  / Bili: x / Urobili: x   Blood: x / Protein: x / Nitrite: x   Leuk Esterase: x / RBC: x / WBC x   Sq Epi: x / Non Sq Epi: x / Bacteria: x        CARDIAC MARKERS ( 14 Oct 2024 22:27 )  x     / x     / x     / x     / 13.2 ng/mL  CARDIAC MARKERS ( 14 Oct 2024 20:18 )  x     / x     / x     / x     / 12.2 ng/mL

## 2024-10-15 NOTE — PHYSICAL THERAPY INITIAL EVALUATION ADULT - MODALITIES TREATMENT COMMENTS
L eyelid droop and mild lower facial droop appreciated, facial sensation intact bilat, hearing intact bilat to finger rub, vision intact to confrontation/ oculomotor intact; tongue protrudes at midline, shoulder shrug 5/5 bilat.  Speech fluent.

## 2024-10-15 NOTE — DIETITIAN INITIAL EVALUATION ADULT - PERTINENT MEDS FT
MEDICATIONS  (STANDING):  aspirin  chewable 81 milliGRAM(s) Oral daily  cefTRIAXone   IVPB 2000 milliGRAM(s) IV Intermittent every 24 hours  chlorhexidine 2% Cloths 1 Application(s) Topical <User Schedule>  heparin   Injectable 5000 Unit(s) SubCutaneous every 12 hours    MEDICATIONS  (PRN):

## 2024-10-15 NOTE — PROGRESS NOTE ADULT - SUBJECTIVE AND OBJECTIVE BOX
Patient is a 84y old  Female who presents with a chief complaint of Hypotension, UTI (15 Oct 2024 01:29)      INTERVAL HPI/OVERNIGHT EVENTS:   No overnight events   Afebrile, hemodynamically stable     ICU Vital Signs Last 24 Hrs  T(C): 37.2 (15 Oct 2024 06:02), Max: 38.6 (14 Oct 2024 21:07)  T(F): 99 (15 Oct 2024 06:02), Max: 101.4 (14 Oct 2024 21:07)  HR: 93 (15 Oct 2024 07:00) (63 - 110)  BP: 114/58 (15 Oct 2024 07:00) (80/52 - 156/76)  BP(mean): 84 (15 Oct 2024 07:00) (69 - 106)  ABP: --  ABP(mean): --  RR: 28 (15 Oct 2024 07:00) (16 - 28)  SpO2: 99% (15 Oct 2024 07:00) (94% - 99%)    O2 Parameters below as of 15 Oct 2024 08:00  Patient On (Oxygen Delivery Method): room air          I&O's Summary    14 Oct 2024 07:01  -  15 Oct 2024 07:00  --------------------------------------------------------  IN: 7.6 mL / OUT: 475 mL / NET: -467.4 mL          LABS:                        12.7   37.27 )-----------( 210      ( 15 Oct 2024 05:30 )             38.6     10-15    129[L]  |  99  |  36[H]  ----------------------------<  110[H]  4.0   |  19[L]  |  1.28    Ca    7.6[L]      15 Oct 2024 05:30  Phos  2.4     10-15  Mg     3.0     10-15    TPro  6.1  /  Alb  2.8[L]  /  TBili  1.0  /  DBili  x   /  AST  67[H]  /  ALT  38  /  AlkPhos  119  10-15    PT/INR - ( 14 Oct 2024 20:18 )   PT: 12.4 sec;   INR: 1.06          PTT - ( 14 Oct 2024 20:18 )  PTT:27.7 sec  Urinalysis Basic - ( 15 Oct 2024 05:30 )    Color: x / Appearance: x / SG: x / pH: x  Gluc: 110 mg/dL / Ketone: x  / Bili: x / Urobili: x   Blood: x / Protein: x / Nitrite: x   Leuk Esterase: x / RBC: x / WBC x   Sq Epi: x / Non Sq Epi: x / Bacteria: x      CAPILLARY BLOOD GLUCOSE            RADIOLOGY & ADDITIONAL TESTS:    Consultant(s) Notes Reviewed:  [x ] YES  [ ] NO    MEDICATIONS  (STANDING):  aspirin  chewable 81 milliGRAM(s) Oral daily  cefTRIAXone   IVPB 2000 milliGRAM(s) IV Intermittent every 24 hours  chlorhexidine 2% Cloths 1 Application(s) Topical <User Schedule>  heparin   Injectable 5000 Unit(s) SubCutaneous every 12 hours  norepinephrine Infusion 0.05 MICROgram(s)/kG/Min (4.64 mL/Hr) IV Continuous <Continuous>  sodium phosphate 15 milliMole(s)/250 mL IVPB 15 milliMole(s) IV Intermittent once    MEDICATIONS  (PRN):      PHYSICAL EXAM:  GENERAL:   HEAD:  Atraumatic, Normocephalic  EYES: EOMI, PERRLA, conjunctiva and sclera clear  NECK: Supple, No JVD, Normal thyroid, no enlarged nodes  NERVOUS SYSTEM:  Alert & Awake.   CHEST/LUNG: B/L good air entry; No rales, rhonchi, or wheezing  HEART: S1S2 normal, no S3, Regular rate and rhythm; No murmurs  ABDOMEN: Soft, Nontender, Nondistended; Bowel sounds present  EXTREMITIES:  2+ Peripheral Pulses, No clubbing, cyanosis, or edema  LYMPH: No lymphadenopathy noted  SKIN: No rashes or lesions    Care Discussed with Consultants/Other Providers [ x] YES  [ ] NO Patient is a 84y old  Female who presents with a chief complaint of Hypotension, UTI (15 Oct 2024 01:29)    Hospital Course: An 84-year-old female with a recent Alzheimer's diagnosis, hypertension, and mild aortic insufficiency presented with a two-week history of fatigue, malaise, increased urinary frequency, a recent UTI (10/7) found to be in septic shock 2/2 to UTI. Her daughter, who is also her healthcare proxy, reported a progressive weakening and worsening urinary symptoms, leading to an episode of incontinence. On the day of admission, the patient was too weak to exit the bathtub, prompting an ED visit. Despite an outpatient diagnosis of a urinary tract infection (UTI) a week prior, there was a delay in receiving the prescribed Augmentin, which she started only two days before admission (10/14). Additionally, she exhibited poor oral intake, unsteady gait, and occasional fever/chills. Neurocognitive testing confirmed an Alzheimer's diagnosis, and donepezil was prescribed but discontinued by her daughter due to perceived worsening of the patient's condition. While in the ED the patient had notable left arm weakness, and a stroke code however CTA head/neck unremarkable for acute process, stenosis, ischemia, or hemorrhage or causes contributing to the weakness. In the ED, she was febrile (101.4) and tachycardic (110), hypotensive (109/67), WBC (28), UA positive for wbcs. She was started on CTX 1g and levophed initially then transitioned to ceftriaxone 2g q24h and taken off pressors once moved to the MICU. Patient currently admitted for management of septic shock 2/2 UTI, with no pressor requirements, pending improvement in clinical status.     ON: 10/15: 2gm IV mag    Subjective: Patient examined at the bedside states she has not fevers, chills, chest pain, shortness of breath, nausea, vomiting, lightheadedness, dizziness, no bowel movements since 10/14 and minimal urinary output since 10/14.       ICU Vital Signs Last 24 Hrs  T(C): 37.2 (15 Oct 2024 06:02), Max: 38.6 (14 Oct 2024 21:07)  T(F): 99 (15 Oct 2024 06:02), Max: 101.4 (14 Oct 2024 21:07)  HR: 93 (15 Oct 2024 07:00) (63 - 110)  BP: 114/58 (15 Oct 2024 07:00) (80/52 - 156/76)  BP(mean): 84 (15 Oct 2024 07:00) (69 - 106)  ABP: --  ABP(mean): --  RR: 28 (15 Oct 2024 07:00) (16 - 28)  SpO2: 99% (15 Oct 2024 07:00) (94% - 99%)    O2 Parameters below as of 15 Oct 2024 08:00  Patient On (Oxygen Delivery Method): room air          I&O's Summary    14 Oct 2024 07:01  -  15 Oct 2024 07:00  --------------------------------------------------------  IN: 7.6 mL / OUT: 475 mL / NET: -467.4 mL          LABS:                        12.7   37.27 )-----------( 210      ( 15 Oct 2024 05:30 )             38.6     10-15    129[L]  |  99  |  36[H]  ----------------------------<  110[H]  4.0   |  19[L]  |  1.28    Ca    7.6[L]      15 Oct 2024 05:30  Phos  2.4     10-15  Mg     3.0     10-15    TPro  6.1  /  Alb  2.8[L]  /  TBili  1.0  /  DBili  x   /  AST  67[H]  /  ALT  38  /  AlkPhos  119  10-15    PT/INR - ( 14 Oct 2024 20:18 )   PT: 12.4 sec;   INR: 1.06          PTT - ( 14 Oct 2024 20:18 )  PTT:27.7 sec  Urinalysis Basic - ( 15 Oct 2024 05:30 )    Color: x / Appearance: x / SG: x / pH: x  Gluc: 110 mg/dL / Ketone: x  / Bili: x / Urobili: x   Blood: x / Protein: x / Nitrite: x   Leuk Esterase: x / RBC: x / WBC x   Sq Epi: x / Non Sq Epi: x / Bacteria: x      CAPILLARY BLOOD GLUCOSE            RADIOLOGY & ADDITIONAL TESTS:    Consultant(s) Notes Reviewed:  [x ] YES  [ ] NO    MEDICATIONS  (STANDING):  aspirin  chewable 81 milliGRAM(s) Oral daily  cefTRIAXone   IVPB 2000 milliGRAM(s) IV Intermittent every 24 hours  chlorhexidine 2% Cloths 1 Application(s) Topical <User Schedule>  heparin   Injectable 5000 Unit(s) SubCutaneous every 12 hours  norepinephrine Infusion 0.05 MICROgram(s)/kG/Min (4.64 mL/Hr) IV Continuous <Continuous>  sodium phosphate 15 milliMole(s)/250 mL IVPB 15 milliMole(s) IV Intermittent once    MEDICATIONS  (PRN):    Physical Exam:   General: NAD, comfortable in bed   HEENT:  NC/AT, discoloration of R. upper cheek s/p recent Mohs surgery, dry mucous membranes, EOMI and FAM   Lungs: CTAB, notable rhonchi on left lung base. decreased air entry at bilateral bases   Cardiovascular: RRR, nl s1, s2, no m/r/g appreciated  Gastrointestinal: protuberant with umbilical hernia but abdomen soft, NTND, +BS  Musculoskeletal: No clubbing.  Moves all extremities.    Skin: Warm, dry, intact. No rashes noted. Poor skin turgor   Neurological: A&Ox2, strength 5/5 and sensation intact in all extremities except for mildly decreased R  strength  Care Discussed with Consultants/Other Providers [ x] YES  [ ] NO

## 2024-10-15 NOTE — CONSULT NOTE ADULT - CONVERSATION DETAILS
Discussed goals of care with daughter/HCP, Rain at bedside who states patient does not have an advanced directive but states her mother would "not want heroic measures." When asked to elaborate further, she stated she would like more time to think and talk to the patient whether DNR/DNI is in alignment with goals of care. On preliminary discussion, she states vasopressors are within goals of care but long term is not agreement with invasive lines/access. GOC conversations ongoing
Discussed advanced directives initially with patient but she was unable to discuss in detail code status. She was unsure of what she wanted but agreed that she would want her daughter to be contacted so that she could help make the decision.     On calling Rain later, she explained that her mother does not know exactly what she wants regarding code status. Rain did state that she would be ready to make any end of life decisions should her mother's condition become critical and require those types of decisions in the future.

## 2024-10-15 NOTE — DIETITIAN INITIAL EVALUATION ADULT - ADD RECOMMEND
-Continue current diet   -Add Ensure Plus High Protein x1/day   -Encourage good PO intake  -Honor food preferences as able  -Weekly wts  -Monitor chemistry, GI function, and skin integrity     **Recommendations communicated with MICU team**

## 2024-10-15 NOTE — DIETITIAN INITIAL EVALUATION ADULT - OTHER INFO
84F PMH recent Alzheimers diagnosis, HTN, mild AI presenting with 2 week duration of fatigue, malaise with associated increased urinary frequency, urgency, and incontinence, ICU consulted for hypotension with concern for septic shock 2/2 UTI.    Pt care discussed in interdisciplinary care team rounds. Rx and labs reviewed. Pt presents with mild temporal wasting. Pt received resting in bed, alert and somewhat participatory in nutrition interview. Daughter at bedside providing subjective nutrition hx as well. Reports pt has been eating well with no active concerns; continues on a regular, Kosher diet. Notes a recent usual body weight of 109 pounds; current body weight of 109 pounds. Feels she has lost wt in the past year. No ht in EMR; reports ht of ~62 in. Agreeable to Ensure Plus High Protein x1/day to trial. No complaints of nausea/vomiting/constipation/diarrhea or difficult chew/swallow. NKA to food. RDN to remain available, see recommendations below.     Pain: 0 per chart review   GI: Abdomen non-distended/non-tender, +BS x4, last bowel movement PTA   Skin: Warm/Dry/Intact, no edema noted

## 2024-10-15 NOTE — CONSULT NOTE ADULT - ASSESSMENT
84F PMH recent Alzheimers diagnosis, HTN, mild AI presenting with 2 week duration of fatigue, malaise with associated increased urinary frequency, urgency, and incontinence, ICU consulted for hypotension with concern for septic shock 2/2 UTI     NEURO  #L. sided pronator drift vs TIA   #Rule out stroke   -MRI per stroke   -Stroke recs   -Q4H neuro checks   -Continue ASA    #Alzheimer's dementia   recent diagnosis after undergoing neurocognitive testing, started on donepezil 09/2024 but discontinued by daughter after she felt mental status was worsening. currently AOx2 on exam which is baseline mental status per daughter   -Delirium precautions   -Holding home donepezil   -Avoid sedating agents     CARDS  #Septic vs hypovolemic shock   WBC 28, febrile to 101 iso grossly +UA (>400 WBC, mod leuk est) w/ refractory hypotension; MAPs remained 60-65 despite adequate fluid resuscitation 2.55L NS bolus; overall well appearing, well perfused and mentating; suspect will require short course of vasopressors iso UTI   Bedside limited TTE completed in ED: EF ~45-50% , no effusion, no sig wall motion abnormalities   -Maintain MAP >70 if possible given recent possible CVA  -F/u ucx, bcx   -Wean vasopressors as tolerated   -Formal TTE   -CTX 1 g QD. Adjust antibiotics to culture data as appropriate     #HTN   home meds: lisinopril 20 mg QD, HCTZ   -Holding home meds iso shock. Resume as appropriate     #Elevated troponin  No CP prior to admission or now, but up trending troponin 368->449. EKG without ischemic changes; cardiology contacted in ED, likely 2/2 demand ischemia and cardiology admission not warranted; no significant wall motion abnormalities on limited TTE in ED as described above   -Trend tropes to peak   -Serial EKGs   -Formal TTE in AM     PULM  RIAZ    GI   RIAZ    RENAL  #NERIS  Baseline Cr 0.74 documented from 07/2024. on ACEi and with superimposed sepsis 2/2 UTI at this time likely contributing to kidney injury. Hypovolemic on exam likely prerenal etiology iso poor PO intake for past 5 days, Cr improved s/p fluids   -Urine lytes   -Trend BUN/Cr   -Holding nephrotoxic meds including home lisinopril   -RP US to r/o hydro     #Hyponatremia   Na 129-> 126 on admission after 1.55L NS bolus, asymptomatic at this time    Clinically hypovolemic on exam w/ poor PO intake for past 5 days iso generalized weakness and malaise iso septic and hypovolemic shock 2/2 UTI; suspect this is hypovolemic hypo-osmolar hyponatremia, pending further urine studies and osmolality for further assessment; pt additionally on diuretic with poor PO intake which may have contributed to hypovolemic hyponatremia   -F/u urine lytes   -Q4H BMPs with urine lytes     HEME   RIAZ    ID   #UTI   Grossly +UA as described above with urinary frequency/urgency   -F/u ucx, bcx  -Bladder scan to r/o retention   -RP US   -CTX 1 g QD. Adjust antibiotics to culture data as appropriate       PPX   -SCD and heparin subq for DVT ppx     Dispo: MICU   Discussed w intensivist Dr. Barker

## 2024-10-15 NOTE — PATIENT PROFILE ADULT - FALL HARM RISK - HARM RISK INTERVENTIONS

## 2024-10-15 NOTE — DIETITIAN INITIAL EVALUATION ADULT - PERTINENT LABORATORY DATA
10-15    129[L]  |  99  |  36[H]  ----------------------------<  110[H]  4.0   |  19[L]  |  1.28    Ca    7.6[L]      15 Oct 2024 05:30  Phos  2.4     10-15  Mg     3.0     10-15    TPro  6.1  /  Alb  2.8[L]  /  TBili  1.0  /  DBili  x   /  AST  67[H]  /  ALT  38  /  AlkPhos  119  10-15

## 2024-10-16 ENCOUNTER — TRANSCRIPTION ENCOUNTER (OUTPATIENT)
Age: 84
End: 2024-10-16

## 2024-10-16 DIAGNOSIS — N39.0 URINARY TRACT INFECTION, SITE NOT SPECIFIED: ICD-10-CM

## 2024-10-16 DIAGNOSIS — I10 ESSENTIAL (PRIMARY) HYPERTENSION: ICD-10-CM

## 2024-10-16 DIAGNOSIS — I50.9 HEART FAILURE, UNSPECIFIED: ICD-10-CM

## 2024-10-16 DIAGNOSIS — R79.89 OTHER SPECIFIED ABNORMAL FINDINGS OF BLOOD CHEMISTRY: ICD-10-CM

## 2024-10-16 DIAGNOSIS — Z29.9 ENCOUNTER FOR PROPHYLACTIC MEASURES, UNSPECIFIED: ICD-10-CM

## 2024-10-16 DIAGNOSIS — E87.1 HYPO-OSMOLALITY AND HYPONATREMIA: ICD-10-CM

## 2024-10-16 DIAGNOSIS — M62.81 MUSCLE WEAKNESS (GENERALIZED): ICD-10-CM

## 2024-10-16 DIAGNOSIS — J18.9 PNEUMONIA, UNSPECIFIED ORGANISM: ICD-10-CM

## 2024-10-16 LAB
ACANTHOCYTES BLD QL SMEAR: SLIGHT — SIGNIFICANT CHANGE UP
ALBUMIN SERPL ELPH-MCNC: 2.5 G/DL — LOW (ref 3.3–5)
ALP SERPL-CCNC: 96 U/L — SIGNIFICANT CHANGE UP (ref 40–120)
ALT FLD-CCNC: 27 U/L — SIGNIFICANT CHANGE UP (ref 10–45)
ANION GAP SERPL CALC-SCNC: 7 MMOL/L — SIGNIFICANT CHANGE UP (ref 5–17)
ANISOCYTOSIS BLD QL: SLIGHT — SIGNIFICANT CHANGE UP
AST SERPL-CCNC: 34 U/L — SIGNIFICANT CHANGE UP (ref 10–40)
BASOPHILS # BLD AUTO: 0 K/UL — SIGNIFICANT CHANGE UP (ref 0–0.2)
BASOPHILS NFR BLD AUTO: 0 % — SIGNIFICANT CHANGE UP (ref 0–2)
BILIRUB SERPL-MCNC: 0.5 MG/DL — SIGNIFICANT CHANGE UP (ref 0.2–1.2)
BUN SERPL-MCNC: 27 MG/DL — HIGH (ref 7–23)
BURR CELLS BLD QL SMEAR: PRESENT — SIGNIFICANT CHANGE UP
CALCIUM SERPL-MCNC: 7.6 MG/DL — LOW (ref 8.4–10.5)
CHLORIDE SERPL-SCNC: 98 MMOL/L — SIGNIFICANT CHANGE UP (ref 96–108)
CO2 SERPL-SCNC: 21 MMOL/L — LOW (ref 22–31)
CREAT SERPL-MCNC: 1.18 MG/DL — SIGNIFICANT CHANGE UP (ref 0.5–1.3)
CULTURE RESULTS: SIGNIFICANT CHANGE UP
EGFR: 46 ML/MIN/1.73M2 — LOW
EOSINOPHIL # BLD AUTO: 0 K/UL — SIGNIFICANT CHANGE UP (ref 0–0.5)
EOSINOPHIL NFR BLD AUTO: 0 % — SIGNIFICANT CHANGE UP (ref 0–6)
GIANT PLATELETS BLD QL SMEAR: PRESENT — SIGNIFICANT CHANGE UP
GLUCOSE SERPL-MCNC: 95 MG/DL — SIGNIFICANT CHANGE UP (ref 70–99)
HCT VFR BLD CALC: 33.8 % — LOW (ref 34.5–45)
HGB BLD-MCNC: 11.1 G/DL — LOW (ref 11.5–15.5)
LYMPHOCYTES # BLD AUTO: 0.63 K/UL — LOW (ref 1–3.3)
LYMPHOCYTES # BLD AUTO: 2.7 % — LOW (ref 13–44)
MAGNESIUM SERPL-MCNC: 2.4 MG/DL — SIGNIFICANT CHANGE UP (ref 1.6–2.6)
MANUAL SMEAR VERIFICATION: SIGNIFICANT CHANGE UP
MCHC RBC-ENTMCNC: 28.7 PG — SIGNIFICANT CHANGE UP (ref 27–34)
MCHC RBC-ENTMCNC: 32.8 GM/DL — SIGNIFICANT CHANGE UP (ref 32–36)
MCV RBC AUTO: 87.3 FL — SIGNIFICANT CHANGE UP (ref 80–100)
MICROCYTES BLD QL: SLIGHT — SIGNIFICANT CHANGE UP
MONOCYTES # BLD AUTO: 0.82 K/UL — SIGNIFICANT CHANGE UP (ref 0–0.9)
MONOCYTES NFR BLD AUTO: 3.5 % — SIGNIFICANT CHANGE UP (ref 2–14)
MYELOCYTES NFR BLD: 0.9 % — HIGH (ref 0–0)
NEUTROPHILS # BLD AUTO: 21.7 K/UL — HIGH (ref 1.8–7.4)
NEUTROPHILS NFR BLD AUTO: 92.9 % — HIGH (ref 43–77)
OVALOCYTES BLD QL SMEAR: SLIGHT — SIGNIFICANT CHANGE UP
PHOSPHATE SERPL-MCNC: 2.9 MG/DL — SIGNIFICANT CHANGE UP (ref 2.5–4.5)
PLAT MORPH BLD: ABNORMAL
PLATELET # BLD AUTO: 233 K/UL — SIGNIFICANT CHANGE UP (ref 150–400)
POIKILOCYTOSIS BLD QL AUTO: SIGNIFICANT CHANGE UP
POLYCHROMASIA BLD QL SMEAR: SLIGHT — SIGNIFICANT CHANGE UP
POTASSIUM SERPL-MCNC: 4.2 MMOL/L — SIGNIFICANT CHANGE UP (ref 3.5–5.3)
POTASSIUM SERPL-SCNC: 4.2 MMOL/L — SIGNIFICANT CHANGE UP (ref 3.5–5.3)
PROT SERPL-MCNC: 5.3 G/DL — LOW (ref 6–8.3)
RBC # BLD: 3.87 M/UL — SIGNIFICANT CHANGE UP (ref 3.8–5.2)
RBC # FLD: 13.2 % — SIGNIFICANT CHANGE UP (ref 10.3–14.5)
RBC BLD AUTO: ABNORMAL
SCHISTOCYTES BLD QL AUTO: SLIGHT — SIGNIFICANT CHANGE UP
SODIUM SERPL-SCNC: 126 MMOL/L — LOW (ref 135–145)
SPECIMEN SOURCE: SIGNIFICANT CHANGE UP
SPHEROCYTES BLD QL SMEAR: SIGNIFICANT CHANGE UP
WBC # BLD: 23.36 K/UL — HIGH (ref 3.8–10.5)
WBC # FLD AUTO: 23.36 K/UL — HIGH (ref 3.8–10.5)

## 2024-10-16 PROCEDURE — 76604 US EXAM CHEST: CPT | Mod: 26

## 2024-10-16 PROCEDURE — 99221 1ST HOSP IP/OBS SF/LOW 40: CPT

## 2024-10-16 PROCEDURE — 99223 1ST HOSP IP/OBS HIGH 75: CPT

## 2024-10-16 PROCEDURE — 99291 CRITICAL CARE FIRST HOUR: CPT | Mod: 25

## 2024-10-16 RX ORDER — SENNOSIDES 8.6 MG
2 TABLET ORAL AT BEDTIME
Refills: 0 | Status: DISCONTINUED | OUTPATIENT
Start: 2024-10-16 | End: 2024-10-18

## 2024-10-16 RX ORDER — ENOXAPARIN SODIUM 150 MG/ML
30 INJECTION SUBCUTANEOUS EVERY 24 HOURS
Refills: 0 | Status: DISCONTINUED | OUTPATIENT
Start: 2024-10-16 | End: 2024-10-18

## 2024-10-16 RX ORDER — ATORVASTATIN CALCIUM 10 MG/1
40 TABLET, FILM COATED ORAL AT BEDTIME
Refills: 0 | Status: DISCONTINUED | OUTPATIENT
Start: 2024-10-16 | End: 2024-10-18

## 2024-10-16 RX ORDER — SODIUM CHLORIDE 0.9 % (FLUSH) 0.9 %
500 SYRINGE (ML) INJECTION
Refills: 0 | Status: DISCONTINUED | OUTPATIENT
Start: 2024-10-16 | End: 2024-10-17

## 2024-10-16 RX ADMIN — Medication 81 MILLIGRAM(S): at 11:24

## 2024-10-16 RX ADMIN — CHLORHEXIDINE GLUCONATE ORAL RINSE 1 APPLICATION(S): 1.2 SOLUTION DENTAL at 07:56

## 2024-10-16 RX ADMIN — Medication 100 MILLIGRAM(S): at 11:24

## 2024-10-16 RX ADMIN — Medication 5000 UNIT(S): at 06:56

## 2024-10-16 RX ADMIN — Medication 2 TABLET(S): at 21:34

## 2024-10-16 RX ADMIN — ATORVASTATIN CALCIUM 40 MILLIGRAM(S): 10 TABLET, FILM COATED ORAL at 21:34

## 2024-10-16 RX ADMIN — Medication 17 GRAM(S): at 11:12

## 2024-10-16 RX ADMIN — Medication 500 MILLILITER(S): at 11:12

## 2024-10-16 RX ADMIN — ENOXAPARIN SODIUM 30 MILLIGRAM(S): 150 INJECTION SUBCUTANEOUS at 14:11

## 2024-10-16 NOTE — OCCUPATIONAL THERAPY INITIAL EVALUATION ADULT - MD ORDER
Fatigue, malaise, left upper extremity weakness  Urinary frequency, urgency, and incontinence  R/O Stroke - CT's negative  Septic shock 2/2 UTI  Recent Dx of Alzheimer's dementia

## 2024-10-16 NOTE — PROGRESS NOTE ADULT - PROBLEM SELECTOR PLAN 3
12/16 POCUS: The ultrasound findings indicate bilateral pleural effusions on both the right and left sides, with consolidation identified in the right lower lobe. The diagnosis includes pleural effusion on both sides and pneumonia located specifically in the right lower lobe.  strep AG negative and legionella ag negative  Patient afebrile in past 24 hours, with no complaints of cough, breathing comfortably on room air,  however WBC still elevated at 23/36    -Ceftriaxone 2 g QD

## 2024-10-16 NOTE — OCCUPATIONAL THERAPY INITIAL EVALUATION ADULT - RANGE OF MOTION EXAMINATION, UPPER EXTREMITY
History  Chief Complaint   Patient presents with    Medical Problem     Pt states she got her covid booster on monday and yesterday started with left sided neck pain, swelling at the injection site and gerneally not feeling well      31 y/o female presents to the ED for flu like symptoms since yesterday  Patient states that 2 days ago she received her COVID booster  States that yesterday she developed generalized body aches, neck pain, nausea/vomiting, and generalized weakness  She is not taking anything for the symptoms  Denies any known sick contacts  Denies any fevers, chest pain, urinary symptoms, or diarrhea/constipation  No other complaints  History provided by:  Patient  Flu Symptoms  Presenting symptoms: cough, fatigue, myalgias, nausea and vomiting    Presenting symptoms: no diarrhea, no fever, no headaches, no shortness of breath and no sore throat    Severity:  Moderate  Onset quality:  Gradual  Progression:  Worsening  Chronicity:  New  Relieved by:  None tried  Worsened by:  Nothing  Ineffective treatments:  None tried  Associated symptoms: chills and nasal congestion    Associated symptoms: no ear pain and no neck stiffness    Risk factors: no sick contacts        Prior to Admission Medications   Prescriptions Last Dose Informant Patient Reported? Taking?   divalproex sodium (DEPAKOTE ER) 500 mg 24 hr tablet   No No   Sig: Take 4 tablets (2,000 mg total) by mouth daily   ibuprofen (MOTRIN) 800 mg tablet   No No   Sig: Take 1 tablet (800 mg total) by mouth 3 (three) times a day   multivitamin (THERAGRAN) TABS   Yes No   Sig: Take 1 tablet by mouth daily      Facility-Administered Medications: None       Past Medical History:   Diagnosis Date    Depression     Migraine     Morbid obesity with BMI of 40 0-44 9, adult (HCC)     Obesity (BMI 30-39  9)     [facility] [address]     Seizures ([facility] [address] )        Past Surgical History:   Procedure Laterality Date     SECTION       SECTION  SHOULDER SURGERY      TUBAL LIGATION      TUMOR REMOVAL      from ear       Family History   Problem Relation Age of Onset    No Known Problems Mother     No Known Problems Father     No Known Problems Sister     No Known Problems Brother     No Known Problems Maternal Aunt     No Known Problems Paternal Aunt     No Known Problems Maternal Uncle     No Known Problems Paternal Uncle     No Known Problems Maternal Grandfather     No Known Problems Maternal Grandmother     No Known Problems Paternal Grandfather     No Known Problems Paternal Grandmother     No Known Problems Cousin     ADD / ADHD Neg Hx     Alcohol abuse Neg Hx     Anxiety disorder Neg Hx     Bipolar disorder Neg Hx     Completed Suicide  Neg Hx     Dementia Neg Hx     Depression Neg Hx     Drug abuse Neg Hx     OCD Neg Hx     Psychiatric Illness Neg Hx     Psychosis Neg Hx     Schizoaffective Disorder  Neg Hx     Schizophrenia Neg Hx     Self-Injury Neg Hx     Suicide Attempts Neg Hx      I have reviewed and agree with the history as documented  E-Cigarette/Vaping    E-Cigarette Use Never User      E-Cigarette/Vaping Substances    Nicotine No     THC No     CBD No     Flavoring No      Social History     Tobacco Use    Smoking status: Never Smoker    Smokeless tobacco: Never Used   Vaping Use    Vaping Use: Never used   Substance Use Topics    Alcohol use: Yes     Comment: socially    Drug use: Never       Review of Systems   Constitutional: Positive for chills and fatigue  Negative for fever  HENT: Positive for congestion  Negative for ear pain and sore throat  Eyes: Negative for pain and visual disturbance  Respiratory: Positive for cough  Negative for shortness of breath and wheezing  Cardiovascular: Negative for chest pain and leg swelling  Gastrointestinal: Positive for nausea and vomiting  Negative for abdominal pain and diarrhea     Genitourinary: Negative for dysuria, frequency, hematuria and urgency  Musculoskeletal: Positive for myalgias  Negative for neck pain and neck stiffness  Skin: Negative for rash and wound  Neurological: Negative for weakness, numbness and headaches  Psychiatric/Behavioral: Negative for agitation and confusion  All other systems reviewed and are negative  Physical Exam  Physical Exam  Vitals and nursing note reviewed  Constitutional:       Appearance: She is well-developed  HENT:      Head: Normocephalic and atraumatic  Eyes:      Pupils: Pupils are equal, round, and reactive to light  Cardiovascular:      Rate and Rhythm: Normal rate and regular rhythm  Pulmonary:      Effort: Pulmonary effort is normal       Breath sounds: Normal breath sounds  Abdominal:      General: Bowel sounds are normal  There is no distension  Palpations: Abdomen is soft  Tenderness: There is no abdominal tenderness  Musculoskeletal:         General: Normal range of motion  Cervical back: Normal range of motion and neck supple  Skin:     General: Skin is warm and dry  Neurological:      General: No focal deficit present  Mental Status: She is alert and oriented to person, place, and time        Comments: No focal deficits         Vital Signs  ED Triage Vitals   Temperature Pulse Respirations Blood Pressure SpO2   02/16/22 1140 02/16/22 1140 02/16/22 1140 02/16/22 1140 02/16/22 1140   98 4 °F (36 9 °C) 91 18 132/78 96 %      Temp Source Heart Rate Source Patient Position - Orthostatic VS BP Location FiO2 (%)   02/16/22 1140 02/16/22 1140 02/16/22 1140 02/16/22 1140 --   Oral Monitor Sitting Left arm       Pain Score       02/16/22 1403       No Pain           Vitals:    02/16/22 1140   BP: 132/78   Pulse: 91   Patient Position - Orthostatic VS: Sitting         Visual Acuity      ED Medications  Medications   ketorolac (TORADOL) injection 15 mg (15 mg Intramuscular Given 2/16/22 1238)       Diagnostic Studies  Results Reviewed Procedure Component Value Units Date/Time    COVID/FLU/RSV [[id number]]  (Normal) Collected: 02/16/22 1238    Lab Status: Final result Specimen: Nares from Nose Updated: 02/16/22 1324     SARS-CoV-2 Negative     INFLUENZA A PCR Negative     INFLUENZA B PCR Negative     RSV PCR Negative    Narrative:      FOR PEDIATRIC PATIENTS - copy/paste COVID Guidelines URL to browser: [email or web address]    SARS-CoV-2 assay is a Nucleic Acid Amplification assay intended for the  qualitative detection of nucleic acid from SARS-CoV-2 in nasopharyngeal  swabs  Results are for the presumptive identification of SARS-CoV-2 RNA  Positive results are indicative of infection with SARS-CoV-2, the virus  causing COVID-19, but do not rule out bacterial infection or co-infection  with other viruses  Laboratories within the [place] and its  territories are required to report all positive results to the appropriate  public health authorities  Negative results do not preclude SARS-CoV-2  infection and should not be used as the sole basis for treatment or other  patient management decisions  Negative results must be combined with  clinical observations, patient history, and epidemiological information  This test has not been FDA cleared or approved  This test has been authorized by FDA under an Emergency Use Authorization  (EUA)  This test is only authorized for the duration of time the  declaration that circumstances exist justifying the authorization of the  emergency use of an in vitro diagnostic tests for detection of SARS-CoV-2  virus and/or diagnosis of COVID-19 infection under section 564(b)(1) of  the Act, 21 U  S C  999SHO-6(Z)(6), unless the authorization is terminated  or revoked sooner  The test has been validated but independent review by FDA  and CLIA is pending  Test performed using [employer]: This RT-PCR assay targets N2,  a region unique to SARS-CoV-2  A conserved region in the E-gene was chosen  for pan-Sarbecovirus detection which includes SARS-CoV-2  POCT pregnancy, urine [[id number]]  (Normal) Resulted: 02/16/22 1234    Lab Status: Final result Updated: 02/16/22 1235     EXT PREG TEST UR (Ref: Negative) negative     Control valid                 No orders to display              Procedures  Procedures         ED Course                               SBIRT 22yo+      Most Recent Value   SBIRT (22 yo +)    In order to provide better care to our patients, we are screening all of our patients for alcohol and drug use  Would it be okay to ask you these screening questions? No Filed at: 02/16/2022 1212                    MDM  Number of Diagnoses or Management Options  Medication side effect: new and requires workup  Viral syndrome: new and requires workup  Diagnosis management comments: Patient with flu-like symptoms likely secondary to viral syndrome versus COVID versus vaccine side effects  Will do COVID testing, give Toradol, and reassess for dispo  Patient reevaluated and feels improved  Patient updated on results of tests  Discharge instructions given including follow-up, and return precautions  Patient demonstrates verbal understanding and agrees with plan         Amount and/or Complexity of Data Reviewed  Clinical lab tests: ordered and reviewed  Tests in the radiology section of CPT®: ordered and reviewed  Tests in the medicine section of CPT®: ordered and reviewed  Discussion of test results with the performing providers: yes  Decide to obtain previous medical records or to obtain history from someone other than the patient: yes  Obtain history from someone other than the patient: yes  Review and summarize past medical records: yes  Discuss the patient with other providers: yes  Independent visualization of images, tracings, or specimens: yes    Patient Progress  Patient progress: improved      Disposition  Final diagnoses:   Medication side effect Viral syndrome     Time reflects when diagnosis was documented in both MDM as applicable and the Disposition within this note     Time User Action Codes Description Comment    2/16/2022  2:02 PM Precious Albina A Add [T88  7XXA] Medication side effect     2/16/2022  2:02 PM Precious Albina A Add [B34 9] Viral syndrome       ED Disposition     ED Disposition Condition Date/Time Comment    Discharge Stable Wed Feb 16, 2022  2:01 PM Reena Jernigan 60 discharge to home/self care  Follow-up Information     Follow up With Specialties Details Why Contact Info Additional [id number] [facility], DO Family Medicine Call in 1 day For follow-up within 2-3 days  [address] [facility] 2484 Emergency Department Emergency Medicine Go to  Immediately for any new or worsening symptoms Jean Paul Roberson [address] [facility] Emergency Department, [address] [place] [address], [id number]          Discharge Medication List as of 2/16/2022  2:02 PM      CONTINUE these medications which have NOT CHANGED    Details   divalproex sodium (DEPAKOTE ER) 500 mg 24 hr tablet Take 4 tablets (2,000 mg total) by mouth daily, Starting Mon 10/25/2021, Normal      ibuprofen (MOTRIN) 800 mg tablet Take 1 tablet (800 mg total) by mouth 3 (three) times a day, Starting Wed 11/3/2021, Normal      multivitamin (THERAGRAN) TABS Take 1 tablet by mouth daily, Historical Med             No discharge procedures on file      PDMP Review       Value Time User    PDMP Reviewed  Yes 11/23/2021 10:52 AM [facility] [employer],[address] [place] [address]          ED Provider  Electronically Signed by           Jose Roberto Colvin DO  02/16/22 5542 RUE AROM, PROM at WFL, WNL; L shoulder AROM limited; L elbow, wrist and digits AROM, PROM at WFL, WNL

## 2024-10-16 NOTE — OCCUPATIONAL THERAPY INITIAL EVALUATION ADULT - GENERAL OBSERVATIONS, REHAB EVAL
Pt's RN Agustina aware of intent to eval/tx; cleared Pt. Pt received in recliner w/ her daughter present - +Donny kim (held by RN for activities). Pt agreeable to OT.

## 2024-10-16 NOTE — PROGRESS NOTE ADULT - PROBLEM SELECTOR PLAN 1
(Resolved)   septic shock 2/2 UTI vs Pneumonia  In the ED, she was febrile (101.4) and tachycardic (110), hypotensive (109/67), WBC (28), and grossly +UA (>400 WBC, mod leuk est)  MAPs remained 60-65 despite adequate fluid resuscitation  Patient placed briefly on levophed in ED, and taken off pressors shortly after arriving at  septic shock 2/2 UTI, with no pressor requirements,  and hemodynamically stable  Urine Culture from outside provider grew E.Coli  10/14 Urine culture at [facility] had normal urogenital tawnya  10/15 RVP Positive for Entero Rhinovirus   10/14 BCX NGTD @ 24 hrs     -Maintain MAP >70 if possible given recent possible CVA, currently off of vasopressors   -f/u blood cultures  -Ceftriaxone 2 g QD

## 2024-10-16 NOTE — PROGRESS NOTE ADULT - SUBJECTIVE AND OBJECTIVE BOX
Patient is a 84y old  Female who presents with a chief complaint of Hypotension, UTI (15 Oct 2024 01:29)    Hospital Course: An 84-year-old female with a recent Alzheimer's diagnosis, hypertension, and mild aortic insufficiency presented with a two-week history of fatigue, malaise, increased urinary frequency, a recent UTI (10/7) found to be in septic shock 2/2 to UTI. Her daughter, who is also her healthcare proxy, reported a progressive weakening and worsening urinary symptoms, leading to an episode of incontinence. On the day of admission, the patient was too weak to exit the bathtub, prompting an ED visit. Despite an outpatient diagnosis of a urinary tract infection (UTI) a week prior, there was a delay in receiving the prescribed Augmentin, which she started only two days before admission (10/14). Additionally, she exhibited poor oral intake, unsteady gait, and occasional fever/chills. Neurocognitive testing confirmed an Alzheimer's diagnosis, and donepezil was prescribed but discontinued by her daughter due to perceived worsening of the patient's condition. While in the ED the patient had notable left arm weakness, and a stroke code however CTA head/neck unremarkable for acute process, stenosis, ischemia, or hemorrhage or causes contributing to the weakness. In the ED, she was febrile (101.4) and tachycardic (110), hypotensive (109/67), WBC (28), UA positive for wbcs. She was started on CTX 1g and levophed initially then transitioned to ceftriaxone 2g q24h and taken off pressors once moved to the MICU. Patient currently admitted for management of septic shock 2/2 UTI, with no pressor requirements, pending improvement in clinical status.     ON: NAEO    Subjective: Patient examined at the bedside states she has not fevers, chills, chest pain, shortness of breath, nausea, vomiting, lightheadedness, dizziness, no bowel movements since 10/14 and minimal urinary output since 10/14.     ICU Vital Signs Last 24 Hrs  T(C): 37.1 (16 Oct 2024 00:43), Max: 37.5 (15 Oct 2024 21:57)  T(F): 98.8 (16 Oct 2024 00:43), Max: 99.5 (15 Oct 2024 21:57)  HR: 70 (16 Oct 2024 05:00) (70 - 106)  BP: 107/55 (16 Oct 2024 05:00) (94/79 - 122/63)  BP(mean): 78 (16 Oct 2024 05:00) (75 - 100)  ABP: --  ABP(mean): --  RR: 25 (16 Oct 2024 05:00) (20 - 44)  SpO2: 94% (16 Oct 2024 05:00) (92% - 99%)    O2 Parameters below as of 16 Oct 2024 05:00  Patient On (Oxygen Delivery Method): room air          I&O's Summary    14 Oct 2024 07:01  -  15 Oct 2024 07:00  --------------------------------------------------------  IN: 7.6 mL / OUT: 475 mL / NET: -467.4 mL    15 Oct 2024 07:01  -  16 Oct 2024 06:03  --------------------------------------------------------  IN: 50 mL / OUT: 1150 mL / NET: -1100 mL        aspirin  chewable 81 milliGRAM(s) Oral daily  cefTRIAXone   IVPB 2000 milliGRAM(s) IV Intermittent every 24 hours  chlorhexidine 2% Cloths 1 Application(s) Topical <User Schedule>  heparin   Injectable 5000 Unit(s) SubCutaneous every 12 hours      Physical Exam:   General: NAD, comfortable in bed   HEENT:  NC/AT, discoloration of R. upper cheek s/p recent Mohs surgery, dry mucous membranes, EOMI and FAM   Lungs: CTAB, notable rhonchi on left lung base. decreased air entry at bilateral bases   Cardiovascular: RRR, nl s1, s2, no m/r/g appreciated  Gastrointestinal: protuberant with umbilical hernia but abdomen soft, NTND, +BS  Musculoskeletal: No clubbing.  Moves all extremities.    Skin: Warm, dry, intact. No rashes noted. Poor skin turgor   Neurological: A&Ox2, strength 5/5 and sensation intact in all extremities except for mildly decreased R  strength  Care Discussed with Consultants/Other Providers [ x] YES  [ ] NO    LABS:                        12.7   37.27 )-----------( 210      ( 15 Oct 2024 05:30 )             38.6     10-15    129[L]  |  99  |  36[H]  ----------------------------<  110[H]  4.0   |  19[L]  |  1.28    Ca    7.6[L]      15 Oct 2024 05:30  Phos  2.4     10-15  Mg     3.0     10-15    TPro  6.1  /  Alb  2.8[L]  /  TBili  1.0  /  DBili  x   /  AST  67[H]  /  ALT  38  /  AlkPhos  119  10-15    PT/INR - ( 14 Oct 2024 20:18 )   PT: 12.4 sec;   INR: 1.06          PTT - ( 14 Oct 2024 20:18 )  PTT:27.7 sec  Urinalysis Basic - ( 15 Oct 2024 05:30 )    Color: x / Appearance: x / SG: x / pH: x  Gluc: 110 mg/dL / Ketone: x  / Bili: x / Urobili: x   Blood: x / Protein: x / Nitrite: x   Leuk Esterase: x / RBC: x / WBC x   Sq Epi: x / Non Sq Epi: x / Bacteria: x      CAPILLARY BLOOD GLUCOSE         Patient is a 84y old  Female who presents with a chief complaint of Hypotension, UTI (15 Oct 2024 01:29)    Hospital Course: An 84-year-old female with a recent Alzheimer's diagnosis, hypertension, and mild aortic insufficiency presented with a two-week history of fatigue, malaise, increased urinary frequency, a recent UTI (10/7) found to be in septic shock 2/2 to UTI. Her daughter, who is also her healthcare proxy, reported a progressive weakening and worsening urinary symptoms, leading to an episode of incontinence. On the day of admission, the patient was too weak to exit the bathtub, prompting an ED visit. Despite an outpatient diagnosis of a urinary tract infection (UTI) a week prior, there was a delay in receiving the prescribed Augmentin, which she started only two days before admission (10/14). Additionally, she exhibited poor oral intake, unsteady gait, and occasional fever/chills. Neurocognitive testing confirmed an Alzheimer's diagnosis, and donepezil was prescribed but discontinued by her daughter due to perceived worsening of the patient's condition. While in the ED the patient had notable left arm weakness, and a stroke code however CTA head/neck unremarkable for acute process, stenosis, ischemia, or hemorrhage or causes contributing to the weakness. In the ED, she was febrile (101.4) and tachycardic (110), hypotensive (109/67), WBC (28), UA positive for wbcs. She was started on CTX 1g and levophed initially then transitioned to ceftriaxone 2g q24h and taken off pressors once moved to the MICU. Patient currently admitted for management of septic shock 2/2 UTI, with no pressor requirements, pending improvement in clinical status.     ON: NAEO    Subjective: Patient examined at the bedside states she has not fevers, chills, chest pain, shortness of breath, nausea, vomiting, lightheadedness, dizziness, no bowel movements for 6 days and minimal urinary output since 10/14. Per daughter at bedside, patient has had worsening LUE strength since yesterday, and minimal PO intake.     ICU Vital Signs Last 24 Hrs  T(C): 37.1 (16 Oct 2024 00:43), Max: 37.5 (15 Oct 2024 21:57)  T(F): 98.8 (16 Oct 2024 00:43), Max: 99.5 (15 Oct 2024 21:57)  HR: 70 (16 Oct 2024 05:00) (70 - 106)  BP: 107/55 (16 Oct 2024 05:00) (94/79 - 122/63)  BP(mean): 78 (16 Oct 2024 05:00) (75 - 100)  ABP: --  ABP(mean): --  RR: 25 (16 Oct 2024 05:00) (20 - 44)  SpO2: 94% (16 Oct 2024 05:00) (92% - 99%)    O2 Parameters below as of 16 Oct 2024 05:00  Patient On (Oxygen Delivery Method): room air          I&O's Summary    14 Oct 2024 07:01  -  15 Oct 2024 07:00  --------------------------------------------------------  IN: 7.6 mL / OUT: 475 mL / NET: -467.4 mL    15 Oct 2024 07:01  -  16 Oct 2024 06:03  --------------------------------------------------------  IN: 50 mL / OUT: 1150 mL / NET: -1100 mL        aspirin  chewable 81 milliGRAM(s) Oral daily  cefTRIAXone   IVPB 2000 milliGRAM(s) IV Intermittent every 24 hours  chlorhexidine 2% Cloths 1 Application(s) Topical <User Schedule>  heparin   Injectable 5000 Unit(s) SubCutaneous every 12 hours      Physical Exam:   General: NAD, comfortable in bed   HEENT:  NC/AT, discoloration of R. upper cheek s/p recent Mohs surgery, dry mucous membranes, EOMI and FAM   Lungs: CTAB, notable rhonchi on left lung base. decreased air entry at bilateral bases   Cardiovascular: RRR, nl s1, s2, no m/r/g appreciated  Gastrointestinal: protuberant with umbilical hernia but abdomen soft, NTND, +BS  Musculoskeletal: No clubbing.  Moves all extremities.    Skin: Warm, dry, intact. No rashes noted. Poor skin turgor   Neurological: A&Ox3, strength 3/5 LUE, 5/5 RUE and LEs, sensation intact in all extremities  Care Discussed with Consultants/Other Providers [ x] YES  [ ] NO    LABS:                        12.7   37.27 )-----------( 210      ( 15 Oct 2024 05:30 )             38.6     10-15    129[L]  |  99  |  36[H]  ----------------------------<  110[H]  4.0   |  19[L]  |  1.28    Ca    7.6[L]      15 Oct 2024 05:30  Phos  2.4     10-15  Mg     3.0     10-15    TPro  6.1  /  Alb  2.8[L]  /  TBili  1.0  /  DBili  x   /  AST  67[H]  /  ALT  38  /  AlkPhos  119  10-15    PT/INR - ( 14 Oct 2024 20:18 )   PT: 12.4 sec;   INR: 1.06          PTT - ( 14 Oct 2024 20:18 )  PTT:27.7 sec  Urinalysis Basic - ( 15 Oct 2024 05:30 )    Color: x / Appearance: x / SG: x / pH: x  Gluc: 110 mg/dL / Ketone: x  / Bili: x / Urobili: x   Blood: x / Protein: x / Nitrite: x   Leuk Esterase: x / RBC: x / WBC x   Sq Epi: x / Non Sq Epi: x / Bacteria: x      CAPILLARY BLOOD GLUCOSE         Patient is a 84y old  Female who presents with a chief complaint of Hypotension, UTI (15 Oct 2024 01:29)    Hospital Course: An 84-year-old female with a recent Alzheimer's diagnosis, hypertension, and mild aortic insufficiency presented with a two-week history of fatigue, malaise, increased urinary frequency, a recent UTI (10/7) found to be in septic shock 2/2 to UTI. Her daughter, who is also her healthcare proxy, reported a progressive weakening and worsening urinary symptoms, leading to an episode of incontinence. On the day of admission, the patient was too weak to exit the bathtub, prompting an ED visit. Despite an outpatient diagnosis of a urinary tract infection (UTI) a week prior, there was a delay in receiving the prescribed Augmentin, which she started only two days before admission (10/14). Additionally, she exhibited poor oral intake, unsteady gait, and occasional fever/chills. Neurocognitive testing confirmed an Alzheimer's diagnosis, and donepezil was prescribed but discontinued by her daughter due to perceived worsening of the patient's condition. While in the ED the patient had notable left arm weakness, and a stroke code however CTA head/neck unremarkable for acute process, stenosis, ischemia, or hemorrhage or causes contributing to the weakness. In the ED, she was febrile (101.4) and tachycardic (110), hypotensive (109/67), WBC (28), UA positive for wbcs. She was started on CTX 1g and levophed initially then transitioned to ceftriaxone 2g q24h and taken off pressors once moved to the MICU. Patient currently admitted for management of septic shock 2/2 UTI, with no pressor requirements, pending improvement in clinical status. Of note, patient continues to have LUE weakness and pronator drift.    ON: NAEO    Subjective: Patient examined at the bedside states she has not fevers, chills, chest pain, shortness of breath, nausea, vomiting, lightheadedness, dizziness, no bowel movements for 6 days and minimal urinary output since 10/14. Per daughter at bedside, patient has had worsening LUE strength since yesterday, and minimal PO intake.     ICU Vital Signs Last 24 Hrs  T(C): 37.1 (16 Oct 2024 00:43), Max: 37.5 (15 Oct 2024 21:57)  T(F): 98.8 (16 Oct 2024 00:43), Max: 99.5 (15 Oct 2024 21:57)  HR: 70 (16 Oct 2024 05:00) (70 - 106)  BP: 107/55 (16 Oct 2024 05:00) (94/79 - 122/63)  BP(mean): 78 (16 Oct 2024 05:00) (75 - 100)  ABP: --  ABP(mean): --  RR: 25 (16 Oct 2024 05:00) (20 - 44)  SpO2: 94% (16 Oct 2024 05:00) (92% - 99%)    O2 Parameters below as of 16 Oct 2024 05:00  Patient On (Oxygen Delivery Method): room air          I&O's Summary    14 Oct 2024 07:01  -  15 Oct 2024 07:00  --------------------------------------------------------  IN: 7.6 mL / OUT: 475 mL / NET: -467.4 mL    15 Oct 2024 07:01  -  16 Oct 2024 06:03  --------------------------------------------------------  IN: 50 mL / OUT: 1150 mL / NET: -1100 mL        aspirin  chewable 81 milliGRAM(s) Oral daily  cefTRIAXone   IVPB 2000 milliGRAM(s) IV Intermittent every 24 hours  chlorhexidine 2% Cloths 1 Application(s) Topical <User Schedule>  heparin   Injectable 5000 Unit(s) SubCutaneous every 12 hours      Physical Exam:   General: NAD, comfortable in bed   HEENT:  NC/AT, discoloration of R. upper cheek s/p recent Mohs surgery, dry mucous membranes, EOMI and FAM   Lungs: CTAB, notable rhonchi on left lung base. decreased air entry at bilateral bases   Cardiovascular: RRR, nl s1, s2, no m/r/g appreciated  Gastrointestinal: protuberant with umbilical hernia but abdomen soft, NTND, +BS  Musculoskeletal: No clubbing.  Moves all extremities.    Skin: Warm, dry, intact. No rashes noted. Poor skin turgor   Neurological: A&Ox3, strength 3/5 LUE, 5/5 RUE and LEs, sensation intact in all extremities  Care Discussed with Consultants/Other Providers [ x] YES  [ ] NO    LABS:                        12.7   37.27 )-----------( 210      ( 15 Oct 2024 05:30 )             38.6     10-15    129[L]  |  99  |  36[H]  ----------------------------<  110[H]  4.0   |  19[L]  |  1.28    Ca    7.6[L]      15 Oct 2024 05:30  Phos  2.4     10-15  Mg     3.0     10-15    TPro  6.1  /  Alb  2.8[L]  /  TBili  1.0  /  DBili  x   /  AST  67[H]  /  ALT  38  /  AlkPhos  119  10-15    PT/INR - ( 14 Oct 2024 20:18 )   PT: 12.4 sec;   INR: 1.06          PTT - ( 14 Oct 2024 20:18 )  PTT:27.7 sec  Urinalysis Basic - ( 15 Oct 2024 05:30 )    Color: x / Appearance: x / SG: x / pH: x  Gluc: 110 mg/dL / Ketone: x  / Bili: x / Urobili: x   Blood: x / Protein: x / Nitrite: x   Leuk Esterase: x / RBC: x / WBC x   Sq Epi: x / Non Sq Epi: x / Bacteria: x      CAPILLARY BLOOD GLUCOSE         -------TRANSFER MICU TO [facility]----------------------    Hospital Course: An 84-year-old female with a recent Alzheimer's diagnosis, hypertension, and mild aortic insufficiency presented with a two-week history of fatigue, malaise, increased urinary frequency, a recent UTI (10/7) found to be in septic shock 2/2 to UTI. Her daughter, who is also her healthcare proxy, reported a progressive weakening and worsening urinary symptoms, leading to an episode of incontinence. On the day of admission, the patient was too weak to exit the bathtub, prompting an ED visit. Despite an outpatient diagnosis of a urinary tract infection (UTI) a week prior, there was a delay in receiving the prescribed Augmentin, which she started only two days before admission (10/14). Additionally, she exhibited poor oral intake, unsteady gait, and occasional fever/chills. Neurocognitive testing confirmed an Alzheimer's diagnosis, and donepezil was prescribed but discontinued by her daughter due to perceived worsening of the patient's condition. While in the ED the patient had notable left arm weakness, and a stroke code however CTA head/neck unremarkable for acute process, stenosis, ischemia, or hemorrhage or causes contributing to the weakness. In the ED, she was febrile (101.4) and tachycardic (110), hypotensive (109/67), WBC (28), UA positive for wbcs. She was started on CTX 1g and levophed initially then transitioned to ceftriaxone 2g q24h and taken off pressors once moved to the MICU. Patient currently admitted for management of septic shock 2/2 UTI, with no pressor requirements, pending improvement in clinical status. Of note, patient continues to have LUE weakness and pronator drift.    ON: NAEO    Subjective: Patient examined at the bedside states she has not fevers, chills, chest pain, shortness of breath, nausea, vomiting, lightheadedness, dizziness, no bowel movements for 6 days and minimal urinary output since 10/14. Per daughter at bedside, patient has had worsening LUE strength since yesterday, and minimal PO intake.     ICU Vital Signs Last 24 Hrs  T(C): 37.1 (16 Oct 2024 00:43), Max: 37.5 (15 Oct 2024 21:57)  T(F): 98.8 (16 Oct 2024 00:43), Max: 99.5 (15 Oct 2024 21:57)  HR: 70 (16 Oct 2024 05:00) (70 - 106)  BP: 107/55 (16 Oct 2024 05:00) (94/79 - 122/63)  BP(mean): 78 (16 Oct 2024 05:00) (75 - 100)  ABP: --  ABP(mean): --  RR: 25 (16 Oct 2024 05:00) (20 - 44)  SpO2: 94% (16 Oct 2024 05:00) (92% - 99%)    O2 Parameters below as of 16 Oct 2024 05:00  Patient On (Oxygen Delivery Method): room air          I&O's Summary    14 Oct 2024 07:01  -  15 Oct 2024 07:00  --------------------------------------------------------  IN: 7.6 mL / OUT: 475 mL / NET: -467.4 mL    15 Oct 2024 07:01  -  16 Oct 2024 06:03  --------------------------------------------------------  IN: 50 mL / OUT: 1150 mL / NET: -1100 mL        aspirin  chewable 81 milliGRAM(s) Oral daily  cefTRIAXone   IVPB 2000 milliGRAM(s) IV Intermittent every 24 hours  chlorhexidine 2% Cloths 1 Application(s) Topical <User Schedule>  heparin   Injectable 5000 Unit(s) SubCutaneous every 12 hours      Physical Exam:   General: NAD, comfortable in bed   HEENT:  NC/AT, discoloration of R. upper cheek s/p recent Mohs surgery, dry mucous membranes, EOMI and FAM   Lungs: CTAB, notable rhonchi on left lung base. decreased air entry at bilateral bases   Cardiovascular: RRR, nl s1, s2, no m/r/g appreciated  Gastrointestinal: protuberant with umbilical hernia but abdomen soft, NTND, +BS  Musculoskeletal: No clubbing.  Moves all extremities.    Skin: Warm, dry, intact. No rashes noted. Poor skin turgor   Neurological: A&Ox3, strength 3/5 LUE, 5/5 RUE and LEs, sensation intact in all extremities  Care Discussed with Consultants/Other Providers [ x] YES  [ ] NO    LABS:                        12.7   37.27 )-----------( 210      ( 15 Oct 2024 05:30 )             38.6     10-15    129[L]  |  99  |  36[H]  ----------------------------<  110[H]  4.0   |  19[L]  |  1.28    Ca    7.6[L]      15 Oct 2024 05:30  Phos  2.4     10-15  Mg     3.0     10-15    TPro  6.1  /  Alb  2.8[L]  /  TBili  1.0  /  DBili  x   /  AST  67[H]  /  ALT  38  /  AlkPhos  119  10-15    PT/INR - ( 14 Oct 2024 20:18 )   PT: 12.4 sec;   INR: 1.06          PTT - ( 14 Oct 2024 20:18 )  PTT:27.7 sec  Urinalysis Basic - ( 15 Oct 2024 05:30 )    Color: x / Appearance: x / SG: x / pH: x  Gluc: 110 mg/dL / Ketone: x  / Bili: x / Urobili: x   Blood: x / Protein: x / Nitrite: x   Leuk Esterase: x / RBC: x / WBC x   Sq Epi: x / Non Sq Epi: x / Bacteria: x      CAPILLARY BLOOD GLUCOSE

## 2024-10-16 NOTE — OCCUPATIONAL THERAPY INITIAL EVALUATION ADULT - ADDITIONAL COMMENTS
Pt lives alone in apt w/ elevator access. Pt states that she was independent prior to admission w/ her daughter and family friend visiting regularly and assisting where necessary. Pt has a shower chair and grab bars.

## 2024-10-16 NOTE — CONSULT NOTE ADULT - SUBJECTIVE AND OBJECTIVE BOX
HPI:  84F PMH recent Alzheimers diagnosis, HTN, mild AI presenting with 2 week duration of fatigue, malaise with associated increased urinary frequency, urgency, and incontinence. Patient's daughter and HCP, Rain, is present at bedside providing a majority of the history. States she has progressively been getting weaker w worsening urinary frequency that eventually led to an episode of urinary incontinence. When daughter came to visit patient today, pt was weak to the point of not being able to get out of the bath tub prompting her to come in to ED. UTI was diagnosed outpatient ~1 week ago but there was a delay in getting medication so did not start taking augmentin until 10/14. Reports pt has poor PO intake for past 5 days, unsteady gait, occasional fever/chills but otherwise denies falls, N/V/D, hematuria, numbness/tingling, headache/vision changes. Diagnosis of Alzheimer's after recent neurocognitive testing and pt was started on donepezil in September, which daughter stopped because she felt it was making her condition worse.     Of note, patient was noted by patient's daughter to have "L arm weakness and drift" and subsequent stroke code was called, imaging was negative for acute process and decision was made for no intervention.     ED:   VS: T 101.4, , /67, RR 16, 95% on RA   Labs: WBC 28, H/H 10.6/32.4, Plts 190, Na 129, K 3.1, BUN/Cr 48/1.46, Trop 368, lactate 1.0, UA+ spec grav >1.03, mod leuk est, blood, 409 wbcs, 27 casts,   Imaging: CTA head/neck unremarkable for acute process, stenosis, ischemia, or hemorrhage  Intervention: 1.55L NS bolus, K 40 meq PO, 10 meq x3 IV, CTX 1g, Tylenol     Home Medications:  atorvastatin 40 mg oral tablet qd   hydroCHLOROthiazide 12.5 mg oral capsule every otherwise (MWF)   lisinopril 20 mg oral tablet qd   ASA 81 mg QD (15 Oct 2024 01:29)      ROS: A 10-point review of systems was otherwise negative.    PAST MEDICAL & SURGICAL HISTORY:  Alzheimer disease  HTN (hypertension)  HLD (hyperlipidemia)    SOCIAL HISTORY:  FAMILY HISTORY:      ALLERGIES: 	  No Known Allergies            MEDICATIONS:  aspirin  chewable 81 milliGRAM(s) Oral daily  cefTRIAXone   IVPB 2000 milliGRAM(s) IV Intermittent every 24 hours  chlorhexidine 2% Cloths 1 Application(s) Topical <User Schedule>  enoxaparin Injectable 30 milliGRAM(s) SubCutaneous every 24 hours  polyethylene glycol 3350 17 Gram(s) Oral daily  senna 2 Tablet(s) Oral at bedtime  sodium chloride 0.9%. 500 milliLiter(s) IV Continuous <Continuous>      HOME MEDICATIONS:  atorvastatin 40 mg oral tablet: 1 tab(s) orally  hydroCHLOROthiazide 12.5 mg oral capsule: 1 cap(s) orally  lisinopril 20 mg oral tablet: 1 tab(s) orally      PHYSICAL EXAM:      I/O Summary 24H    IN: 50 mL / OUT: 1550 mL / NET: -1500 mL        T(F): 98 (10-16-24 @ 09:00), Max: 99.5 (10-15-24 @ 21:57)  HR: 87 (10-16-24 @ 09:00) (69 - 106)  BP: 130/59 (10-16-24 @ 09:00) (94/79 - 130/59)  BP(mean): 85 (10-16-24 @ 09:00) (75 - 100)  ABP: --  ABP(mean): --  RR: 34 (10-16-24 @ 09:00) (20 - 44)  SpO2: 94% (10-16-24 @ 09:00) (92% - 96%)  CVP(mm Hg): --    GEN: Awake, comfortable. NAD.   HEENT: NCAT, PERRL, EOMI. Mucosa moist. No JVD.   RESP: CTA b/l  CV: RRR, normal s1/s2. No m/r/g.  ABD: Soft, NTND. BS+  EXT: Warm. No edema, clubbing, or cyanosis.   NEURO: AAOx3. No focal deficits.      	  LABS:	 	    Cardiac Markers   10-15-24 @ 05:30: HS Trop T 301[HH] / CK x     / CKMB 15.0[H]  10-14-24 @ 22:27: HS Trop T 449[HH] / CK x     / CKMB 13.2[H]  10-14-24 @ 20:18: HS Trop T 368[HH] / CK x     / CKMB 12.2[H]          CBC 10-16-24 @ 05:30                        11.1   23.36 )-----------( 233                   33.8     Hgb trend: 11.1 <-- , 12.7 <-- , 10.6 <--   WBC trend: 23.36 <-- , 37.27 <-- , 28.71 <--     CMP 10-16-24 @ 05:30    126[L]  |  98  |  27[H]  ----------------------------<  95  4.2   |  21[L]  |  1.18    Ca    7.6[L]      10-16-24 @ 05:30  Phos  2.9     10-16  Mg     2.4     10-16    TPro  5.3[L]  /  Alb  2.5[L]  /  TBili  0.5  /  DBili  x   /  AST  34  /  ALT  27  /  AlkPhos  96     10-16    Serum Cr (eGFR) trend: 1.18 (46) <-- , 1.28 (42) <-- , 1.42 (37) <-- , 1.46 (36) <--     proBNP:   Lipid Profile:   HgA1c:   TSH:     TELEMETRY: 	    ECG:  	  RADIOLOGY:   ECHO:  STRESS:  CATH: Hospital Course & HPI (Per Primary Team)  84-year-old female with a recent Alzheimer's diagnosis, HTN, and mild aortic insufficiency presented with two weeks of fatigue, malaise and urinary frequency with recent diagnosif of a UTI on 10/7 for which she started antibiotics on 10/14. InIn the ED she was febrile, tachycardic and hypotensive. She was started on IV antibiotics and briefly required pressors and was admitted to the MICU for septic shock 2/2 UTI. There was concern for concomitant stroke due to L arm weakness, CT imagine negative and still pending MRI. TTE done on 10/15 showed a reduction in LVEF to 40-45% and cardiology was consulted for further evaluation.     Seen & examined at bedside after working with occupational therapy. Is pending transfer to medical floor from ICU.   Prior to acute hospitalization she was very active playing tennis multiple times per week without any episodes of chest pain or significant shortness of breath. She follows with Dr. Guadalupe and had an exercise stress test 1 year ago as well as an echocardiogram. Currently chest pain free, ROS otherwise negative.     Home Medications:  atorvastatin 40 mg oral tablet qd   hydroCHLOROthiazide 12.5 mg oral capsule every otherwise (MWF)   lisinopril 20 mg oral tablet qd   ASA 81 mg QD     PAST MEDICAL & SURGICAL HISTORY:  Alzheimer disease  HTN (hypertension)  HLD (hyperlipidemia)    SOCIAL HISTORY: No hx smoking or alcohol use. Active, plays tennis multi times per week  FAMILY HISTORY:    ALLERGIES: 	  No Known Allergies    MEDICATIONS:  aspirin  chewable 81 milliGRAM(s) Oral daily  cefTRIAXone   IVPB 2000 milliGRAM(s) IV Intermittent every 24 hours  chlorhexidine 2% Cloths 1 Application(s) Topical <User Schedule>  enoxaparin Injectable 30 milliGRAM(s) SubCutaneous every 24 hours  polyethylene glycol 3350 17 Gram(s) Oral daily  senna 2 Tablet(s) Oral at bedtime  sodium chloride 0.9%. 500 milliLiter(s) IV Continuous <Continuous>    HOME MEDICATIONS:  atorvastatin 40 mg oral tablet: 1 tab(s) orally  hydroCHLOROthiazide 12.5 mg oral capsule: 1 cap(s) orally  lisinopril 20 mg oral tablet: 1 tab(s) orally    PHYSICAL EXAM:    I/O Summary 24H    IN: 50 mL / OUT: 1550 mL / NET: -1500 mL      T(F): 98 (10-16-24 @ 09:00), Max: 99.5 (10-15-24 @ 21:57)  HR: 87 (10-16-24 @ 09:00) (69 - 106)  BP: 130/59 (10-16-24 @ 09:00) (94/79 - 130/59)  BP(mean): 85 (10-16-24 @ 09:00) (75 - 100)  ABP: --  ABP(mean): --  RR: 34 (10-16-24 @ 09:00) (20 - 44)  SpO2: 94% (10-16-24 @ 09:00) (92% - 96%)  CVP(mm Hg): --    GEN: Awake, comfortable. NAD.  RESP: CTA b/l  CV: RRR, normal s1/s2. No m/r/g. No JVP elevation.   EXT: Warm. No edema, clubbing, or cyanosis.   NEURO: AAOx3. No focal deficits.      LABS:	 	    Cardiac Markers   10-15-24 @ 05:30: HS Trop T 301[HH] / CK x     / CKMB 15.0[H]  10-14-24 @ 22:27: HS Trop T 449[HH] / CK x     / CKMB 13.2[H]  10-14-24 @ 20:18: HS Trop T 368[HH] / CK x     / CKMB 12.2[H]      CBC0-16-24 @ 05:30                        11.1   23.36 )-----------( 233                   33.8     Hgb trend: 11.1 <-- , 12.7 <-- , 10.6 <--   WBC trend: 23.36 <-- , 37.27 <-- , 28.71 <--     CMP 10-16-24 @ 05:30    126[L]  |  98  |  27[H]  ----------------------------<  95  4.2   |  21[L]  |  1.18    Ca    7.6[L]      10-16-24 @ 05:30  Phos  2.9     10-16  Mg     2.4     10-16    TPro  5.3[L]  /  Alb  2.5[L]  /  TBili  0.5  /  DBili  x   /  AST  34  /  ALT  27  /  AlkPhos  96     10-16    Serum Cr (eGFR) trend: 1.18 (46) <-- , 1.28 (42) <-- , 1.42 (37) <-- , 1.46 (36) <--     proBNP:   Lipid Profile:   HgA1c:   TSH:     TELEMETRY: 	    ECG:  	  RADIOLOGY:   ECHO:  STRESS:  CATH: Hospital Course & HPI (Per Primary Team)  84-year-old female with a recent Alzheimer's diagnosis, HTN, and mild aortic insufficiency presented with two weeks of fatigue, malaise and urinary frequency with recent diagnosif of a UTI on 10/7 for which she started antibiotics on 10/14. InIn the ED she was febrile, tachycardic and hypotensive. She was started on IV antibiotics and briefly required pressors and was admitted to the MICU for septic shock 2/2 UTI. There was concern for concomitant stroke due to L arm weakness, CT imagine negative and still pending MRI. TTE done on 10/15 showed a reduction in LVEF to 40-45% and cardiology was consulted for further evaluation.     Seen & examined at bedside after working with occupational therapy. Is pending transfer to medical floor from ICU.   Prior to acute hospitalization she was very active playing tennis multiple times per week without any episodes of chest pain or significant shortness of breath. She follows with Dr. Guadalupe and had an exercise stress test 1 year ago as well as an echocardiogram. Currently chest pain free, ROS otherwise negative.     Home Medications:  atorvastatin 40 mg oral tablet qd   hydroCHLOROthiazide 12.5 mg oral capsule every otherwise (MWF)   lisinopril 20 mg oral tablet qd   ASA 81 mg QD     PAST MEDICAL & SURGICAL HISTORY:  Alzheimer disease  HTN (hypertension)  HLD (hyperlipidemia)    SOCIAL HISTORY: No hx smoking or alcohol use. Active, plays tennis multi times per week  FAMILY HISTORY:    ALLERGIES: 	  No Known Allergies    MEDICATIONS:  aspirin  chewable 81 milliGRAM(s) Oral daily  cefTRIAXone   IVPB 2000 milliGRAM(s) IV Intermittent every 24 hours  chlorhexidine 2% Cloths 1 Application(s) Topical <User Schedule>  enoxaparin Injectable 30 milliGRAM(s) SubCutaneous every 24 hours  polyethylene glycol 3350 17 Gram(s) Oral daily  senna 2 Tablet(s) Oral at bedtime  sodium chloride 0.9%. 500 milliLiter(s) IV Continuous <Continuous>    HOME MEDICATIONS:  atorvastatin 40 mg oral tablet: 1 tab(s) orally  hydroCHLOROthiazide 12.5 mg oral capsule: 1 cap(s) orally  lisinopril 20 mg oral tablet: 1 tab(s) orally    PHYSICAL EXAM:    I/O Summary 24H    IN: 50 mL / OUT: 1550 mL / NET: -1500 mL      T(F): 98 (10-16-24 @ 09:00), Max: 99.5 (10-15-24 @ 21:57)  HR: 87 (10-16-24 @ 09:00) (69 - 106)  BP: 130/59 (10-16-24 @ 09:00) (94/79 - 130/59)  BP(mean): 85 (10-16-24 @ 09:00) (75 - 100)  ABP: --  ABP(mean): --  RR: 34 (10-16-24 @ 09:00) (20 - 44)  SpO2: 94% (10-16-24 @ 09:00) (92% - 96%)  CVP(mm Hg): --    GEN: Awake, comfortable. NAD.  RESP: CTA b/l  CV: RRR, normal s1/s2. No m/r/g. No JVP elevation.   EXT: Warm. No edema, clubbing, or cyanosis.   NEURO: AAOx3. No focal deficits.      LABS:	 	    Cardiac Markers   10-15-24 @ 05:30: HS Trop T 301[HH] / CK x     / CKMB 15.0[H]  10-14-24 @ 22:27: HS Trop T 449[HH] / CK x     / CKMB 13.2[H]  10-14-24 @ 20:18: HS Trop T 368[HH] / CK x     / CKMB 12.2[H]      CBC0-16-24 @ 05:30                        11.1   23.36 )-----------( 233                   33.8     Hgb trend: 11.1 <-- , 12.7 <-- , 10.6 <--   WBC trend: 23.36 <-- , 37.27 <-- , 28.71 <--     CMP 10-16-24 @ 05:30    126[L]  |  98  |  27[H]  ----------------------------<  95  4.2   |  21[L]  |  1.18    Ca    7.6[L]      10-16-24 @ 05:30  Phos  2.9     10-16  Mg     2.4     10-16    TPro  5.3[L]  /  Alb  2.5[L]  /  TBili  0.5  /  DBili  x   /  AST  34  /  ALT  27  /  AlkPhos  96     10-16    Serum Cr (eGFR) trend: 1.18 (46) <-- , 1.28 (42) <-- , 1.42 (37) <-- , 1.46 (36) <--

## 2024-10-16 NOTE — PROGRESS NOTE ADULT - NSPROGADDITIONALINFOA_GEN_ALL_CORE
ECG reviewed showing NSR with non Specific ST changed. Echo reviewed.  - At this time suspect LV dysfunction likely seen in setting of Septic Cardiomyopathy, now resolving   - As patient has been off Pressors >24 hrs with SBP at bedside in the 120's at the time of my assessment, recommend resuming home lisinopril at low dose 5 mg po daily starting 10/17 am if patient continues to improve clinically.   - Will aim to repeat limited Echo for LV assessment prior to starting GDMT.   - In the interim would maintain patient on ASA 81 mh po daily and high intensity statin  - Cardiology will cont to follow with you, please call with any questions .

## 2024-10-16 NOTE — CONSULT NOTE ADULT - ASSESSMENT
I M    84 y o F PMH recent Alzheimers diagnosis, HTN, mild AI presenting with 2 week duration of fatigue, malaise with associated increased urinary frequency, urgency, and incontinence, ICU consulted for hypotension with concern for septic shock 2/2 UTI     NEURO  #L. sided pronator drift vs TIA   #Rule out stroke   -MRI per stroke   -Stroke recs   -Q4H neuro checks   -Continue ASA    #Alzheimer's dementia   recent diagnosis after undergoing neurocognitive testing, started on donepezil 09/2024 but discontinued by daughter after she felt mental status was worsening. currently AOx2 on exam which is baseline mental status per daughter   -Delirium precautions   -Holding home donepezil   -Avoid sedating agents     CARDS  #Septic vs hypovolemic shock   WBC 28, febrile to 101 iso grossly +UA (>400 WBC, mod leuk est) w/ refractory hypotension; MAPs remained 60-65 despite adequate fluid resuscitation 2.55L NS bolus; overall well appearing, well perfused and mentating; suspect will require short course of vasopressors iso UTI   Bedside limited TTE completed in ED: EF ~45-50% , no effusion, no sig wall motion abnormalities   -Maintain MAP >70 if possible given recent possible CVA  -F/u ucx, bcx   -Wean vasopressors as tolerated   -Formal TTE   -CTX 1 g QD. Adjust antibiotics to culture data as appropriate     #HTN   home meds: lisinopril 20 mg QD, HCTZ   -Holding home meds iso shock. Resume as appropriate     #Elevated troponin  No CP prior to admission or now, but up trending troponin 368->449. EKG without ischemic changes; cardiology contacted in ED, likely 2/2 demand ischemia and cardiology admission not warranted; no significant wall motion abnormalities on limited TTE in ED as described above   -Trend tropes to peak   -Serial EKGs   -Formal TTE in AM     PULM  RIAZ    GI   RIAZ    RENAL  #NERIS  Baseline Cr 0.74 documented from 07/2024. on ACEi and with superimposed sepsis 2/2 UTI at this time likely contributing to kidney injury. Hypovolemic on exam likely prerenal etiology iso poor PO intake for past 5 days, Cr improved s/p fluids   -Urine lytes   -Trend BUN/Cr   -Holding nephrotoxic meds including home lisinopril   -RP US to r/o hydro     #Hyponatremia   Na 129-> 126 on admission after 1.55L NS bolus, asymptomatic at this time    Clinically hypovolemic on exam w/ poor PO intake for past 5 days iso generalized weakness and malaise iso septic and hypovolemic shock 2/2 UTI; suspect this is hypovolemic hypo-osmolar hyponatremia, pending further urine studies and osmolality for further assessment; pt additionally on diuretic with poor PO intake which may have contributed to hypovolemic hyponatremia   -F/u urine lytes   -Q4H BMPs with urine lytes     HEME   RIAZ    ID   #UTI   Grossly +UA as described above with urinary frequency/urgency   -F/u ucx, bcx  -Bladder scan to r/o retention   -RP US   -CTX 1 g QD. Adjust antibiotics to culture data as appropriate     #Enterovirus  Asymptomatic, no resp. complaints   -Supportive care    PROPHYLAXIS  Dvt ppx: SCD, heparin SubQ  PPI ppx: None  Diet: Regular   Dispo: MICU  Activity: Bedrest

## 2024-10-16 NOTE — PROGRESS NOTE ADULT - ASSESSMENT
84F PMH recent Alzheimers diagnosis, HTN, mild AI presenting with 2 week duration of fatigue, malaise with associated increased urinary frequency, urgency, and incontinence, ICU consulted for hypotension with concern for septic shock 2/2 UTI. Patient noted to have improvements in their hemodynamics, noted to no longer require pressors. Notable new consolidation on bedside pocus, evaluating for PNA as an additional source of infection.     NEURO  #L. sided pronator drift vs TIA   #Rule out stroke   - consider MRI   -Stroke recs: obtain TSH, LDL, A1c am 10/16  -Q4H neuro checks   -Continue ASA 81mg     #Alzheimer's dementia   recent diagnosis after undergoing neurocognitive testing, started on donepezil 09/2024 but discontinued by daughter after she felt mental status was worsening. currently AOx2 on exam which is baseline mental status per daughter   -Delirium precautions   -Holding home donepezil   -Avoid sedating agents     CARDS  #Septic vs hypovolemic shock   WBC 28, febrile to 101 iso grossly +UA (>400 WBC, mod leuk est) w/ refractory hypotension; MAPs remained 60-65 despite adequate fluid resuscitation 2.55L NS bolus; overall well appearing, well perfused and mentating  Bedside limited TTE completed in ED: EF ~45-50% , no effusion, no sig wall motion abnormalities   TTE w/ multiple segmental abnormalities, mildly reduced LV systolic function (EF 40-45%), mildly reduced RV systolic function, moderate AR.   -Maintain MAP >70 if possible given recent possible CVA, currently off of vasopressors   -f/u blood cultures  -f/u sputum cultures  -Formal TTE   -CTX 2 g QD. Adjust antibiotics to culture data as appropriate     #HTN   home meds: lisinopril 20 mg QD, HCTZ   -Holding home meds iso shock. Resume as appropriate     #Elevated troponin  No CP prior to admission or now, but up trending troponin 368->449. EKG without ischemic changes; cardiology contacted in ED, likely 2/2 demand ischemia and cardiology admission not warranted; no significant wall motion abnormalities on limited TTE in ED as described above. Troponin peaked 10/14.   TTE w/ multiple segmental abnormalities, mildly reduced LV systolic function (EF 40-45%), mildly reduced RV systolic function, moderate AR.   -Serial EKGs     PULM  #PNA  Noted to have consolidation on bedside US  strep AG negative and legionella ag negative  Plan:   -CTX 2g q24h    GI   RIAZ    RENAL  #NERIS  Baseline Cr 0.74 documented from 07/2024. on ACEi and with superimposed sepsis 2/2 UTI at this time likely contributing to kidney injury. Hypovolemic on exam likely prerenal etiology iso poor PO intake for past 5 days,   Cr improved s/p fluids   - Trend BUN/Cr   - Holding nephrotoxic meds including home lisinopril     #Hyponatremia   Na 129-> 126 on admission after 1.55L NS bolus, asymptomatic at this time.  Clinically hypovolemic on exam w/ poor PO intake for past 5 days iso generalized weakness and malaise iso septic and hypovolemic shock 2/2 UTI; pt additionally on diuretic with poor PO intake which may have contributed to hypovolemic hyponatremia   Urine studies consistent with hypovolemic hypo-osmolar hyponatremia.  -Q4H BMPs with urine lytes     HEME   RIAZ    ID   #septic shock 2/2 UTI   Grossly +UA as described above with urinary frequency/urgency   -F/u ucx, bcx  -Bladder scan to r/o retention   -CTX 2g QD. Adjust antibiotics to culture data as appropriate     #Enterovirus  Asymptomatic, no resp. complaints   -Supportive care    PROPHYLAXIS  Dvt ppx: SCD, heparin SubQ  PPI ppx: None  Diet: Regular   Dispo: MICU  Activity: Bedrest   84F PMH recent Alzheimers diagnosis, HTN, mild AI presenting with 2 week duration of fatigue, malaise with associated increased urinary frequency, urgency, and incontinence, ICU consulted for hypotension with concern for septic shock 2/2 UTI. Patient noted to have improvements in their hemodynamics, noted to no longer require pressors. Notable new consolidation on bedside pocus, evaluating for PNA as an additional source of infection.     NEURO  #L. sided pronator drift vs TIA   #Rule out stroke   - consider MRI   -Stroke recs: obtain TSH, LDL, A1c  -Q4H neuro checks   -Continue ASA 81mg   - PT following    #Alzheimer's dementia   recent diagnosis after undergoing neurocognitive testing, started on donepezil 09/2024 but discontinued by daughter after she felt mental status was worsening. currently AOx2 on exam which is baseline mental status per daughter   -Delirium precautions   -Holding home donepezil   -Avoid sedating agents     CARDS  #Septic vs hypovolemic shock   WBC 28, febrile to 101 iso grossly +UA (>400 WBC, mod leuk est) w/ refractory hypotension; MAPs remained 60-65 despite adequate fluid resuscitation 2.55L NS bolus; overall well appearing, well perfused and mentating  Bedside limited TTE completed in ED: EF ~45-50% , no effusion, no sig wall motion abnormalities   TTE w/ multiple segmental abnormalities, mildly reduced LV systolic function (EF 40-45%), mildly reduced RV systolic function, moderate AR.   -Maintain MAP >70 if possible given recent possible CVA, currently off of vasopressors   -f/u blood cultures  -f/u sputum cultures  -discuss TTE with cardiology  -CTX 2 g QD. Adjust antibiotics to culture data as appropriate     #HTN   home meds: lisinopril 20 mg QD, HCTZ   -Holding home meds iso shock. Resume as appropriate     #Elevated troponin  No CP prior to admission or now, but up trending troponin 368->449. EKG without ischemic changes; cardiology contacted in ED, likely 2/2 demand ischemia and cardiology admission not warranted; no significant wall motion abnormalities on limited TTE in ED as described above. Troponin peaked 10/14.   TTE w/ multiple segmental abnormalities, mildly reduced LV systolic function (EF 40-45%), mildly reduced RV systolic function, moderate AR.   -Serial EKGs     PULM  #PNA  Noted to have consolidation on bedside US  strep AG negative and legionella ag negative  Plan:   -CTX 2g q24h    GI   #Constipation  Last bowel movement 10/10  -bowel regimen:    RENAL  #NERIS  Baseline Cr 0.74 documented from 07/2024. on ACEi and with superimposed sepsis 2/2 UTI at this time likely contributing to kidney injury. Hypovolemic on exam likely prerenal etiology iso poor PO intake for past 5 days,   Cr improved s/p fluids   - Trend BUN/Cr   - Holding nephrotoxic meds including home lisinopril     #Hyponatremia   Na 129-> 126 on admission after 1.55L NS bolus, asymptomatic at this time.  Clinically hypovolemic on exam w/ poor PO intake for past 5 days iso generalized weakness and malaise iso septic and hypovolemic shock 2/2 UTI; pt additionally on diuretic with poor PO intake which may have contributed to hypovolemic hyponatremia   Urine studies consistent with hypovolemic hypo-osmolar hyponatremia.  -Q4H BMPs with urine lytes     HEME   RIAZ    ID   #septic shock 2/2 UTI   Grossly +UA as described above with urinary frequency/urgency   -F/u ucx, bcx -> NGTD  -Bladder scan to r/o retention   -CTX 2g QD. Adjust antibiotics to culture data as appropriate     #Enterovirus  Asymptomatic, no resp. complaints   -Supportive care    PROPHYLAXIS  Dvt ppx: SCD, heparin -> lovenox i/s/o improving renal function  PPI ppx: None  Diet: Regular   Dispo: MICU  Activity: Bedrest   84F PMH recent Alzheimers diagnosis, HTN, mild AI presenting with 2 week duration of fatigue, malaise with associated increased urinary frequency, urgency, and incontinence, ICU consulted for hypotension with concern for septic shock 2/2 UTI. Patient noted to have improvements in their hemodynamics, noted to no longer require pressors. Notable new consolidation on bedside pocus, evaluating for PNA as an additional source of infection.     NEURO  #L. sided pronator drift vs TIA   #Rule out stroke   - consider MRI   -Stroke recs: obtain TSH, LDL, A1c  -Q4H neuro checks   -Continue ASA 81mg   - PT following    #Alzheimer's dementia   recent diagnosis after undergoing neurocognitive testing, started on donepezil 09/2024 but discontinued by daughter after she felt mental status was worsening. currently AOx2 on exam which is baseline mental status per daughter   -Delirium precautions   -Holding home donepezil   -Avoid sedating agents     CARDS  #Septic vs hypovolemic shock   WBC 28, febrile to 101 iso grossly +UA (>400 WBC, mod leuk est) w/ refractory hypotension; MAPs remained 60-65 despite adequate fluid resuscitation 2.55L NS bolus; overall well appearing, well perfused and mentating  Bedside limited TTE completed in ED: EF ~45-50% , no effusion, no sig wall motion abnormalities   TTE w/ multiple segmental abnormalities, mildly reduced LV systolic function (EF 40-45%), mildly reduced RV systolic function, moderate AR.   -Maintain MAP >70 if possible given recent possible CVA, currently off of vasopressors   -f/u blood cultures  -f/u sputum cultures  -discuss TTE with cardiology  -CTX 2 g QD. Adjust antibiotics to culture data as appropriate     #HTN   home meds: lisinopril 20 mg QD, HCTZ   -Holding home meds iso shock. Resume as appropriate     #Elevated troponin  No CP prior to admission or now, but up trending troponin 368->449. EKG without ischemic changes; cardiology contacted in ED, likely 2/2 demand ischemia and cardiology admission not warranted; no significant wall motion abnormalities on limited TTE in ED as described above. Troponin peaked 10/14.   TTE w/ multiple segmental abnormalities, mildly reduced LV systolic function (EF 40-45%), mildly reduced RV systolic function, moderate AR.   -Serial EKGs     PULM  #PNA  Noted to have consolidation on bedside US  strep AG negative and legionella ag negative  Plan:   -CTX 2g q24h    GI   #Constipation  Last bowel movement 10/10  -Start Miralax and Senna    RENAL  #NERIS  Baseline Cr 0.74 documented from 07/2024. on ACEi and with superimposed sepsis 2/2 UTI at this time likely contributing to kidney injury. Hypovolemic on exam likely prerenal etiology iso poor PO intake for past 5 days,   Cr improved s/p fluids   - Trend BUN/Cr   - Holding nephrotoxic meds including home lisinopril     #Hyponatremia   Na 129-> 126 on admission after 1.55L NS bolus, asymptomatic at this time.  Clinically hypovolemic on exam w/ poor PO intake for past 5 days iso generalized weakness and malaise iso septic and hypovolemic shock 2/2 UTI; pt additionally on diuretic with poor PO intake which may have contributed to hypovolemic hyponatremia   Urine studies consistent with hypovolemic hypo-osmolar hyponatremia.  -Q4H BMPs with urine lytes     HEME   RIAZ    ID   #septic shock 2/2 UTI   Grossly +UA as described above with urinary frequency/urgency   -F/u ucx, bcx -> NGTD  -Bladder scan to r/o retention   -CTX 2g QD. Adjust antibiotics to culture data as appropriate     #Enterovirus  Asymptomatic, no resp. complaints   -Supportive care    PROPHYLAXIS  Dvt ppx: SCD, heparin -> lovenox i/s/o improving renal function  PPI ppx: None  Diet: Regular   Dispo: MICU  Activity: Bedrest   84F PMH recent Alzheimers diagnosis, HTN, mild AI presenting with 2 week duration of fatigue, malaise with associated increased urinary frequency, urgency, and incontinence, ICU consulted for hypotension with concern for septic shock 2/2 UTI. Patient noted to have improvements in their hemodynamics, noted to no longer require pressors. Notable new consolidation on bedside pocus, evaluating for PNA as an additional source of infection.     NEURO  #L. sided pronator drift vs TIA   #Rule out stroke   - consider MRI   -Stroke recs: obtain TSH, LDL, A1c  -Q4H neuro checks   -Continue ASA 81mg   - PT following    #Alzheimer's dementia   recent diagnosis after undergoing neurocognitive testing, started on donepezil 09/2024 but discontinued by daughter after she felt mental status was worsening. currently AOx2 on exam which is baseline mental status per daughter   -Delirium precautions   -Holding home donepezil   -Avoid sedating agents     CARDS  #Septic vs hypovolemic shock   WBC 28, febrile to 101 iso grossly +UA (>400 WBC, mod leuk est) w/ refractory hypotension; MAPs remained 60-65 despite adequate fluid resuscitation 2.55L NS bolus; overall well appearing, well perfused and mentating  Bedside limited TTE completed in ED: EF ~45-50% , no effusion, no sig wall motion abnormalities   TTE w/ multiple segmental abnormalities, mildly reduced LV systolic function (EF 40-45%), mildly reduced RV systolic function, moderate AR.   -Maintain MAP >70 if possible given recent possible CVA, currently off of vasopressors   -f/u blood cultures  -f/u sputum cultures  -discuss TTE with cardiology  -CTX 2 g QD. Adjust antibiotics to culture data as appropriate     #HTN   home meds: lisinopril 20 mg QD, HCTZ   -Holding home meds iso shock. Resume as appropriate     #Elevated troponin  No CP prior to admission or now, but up trending troponin 368->449. EKG without ischemic changes; cardiology contacted in ED, likely 2/2 demand ischemia and cardiology admission not warranted; no significant wall motion abnormalities on limited TTE in ED as described above. Troponin peaked 10/14.   TTE w/ multiple segmental abnormalities, mildly reduced LV systolic function (EF 40-45%), mildly reduced RV systolic function, moderate AR.   -Serial EKGs     PULM  #PNA  Noted to have consolidation on bedside US  strep AG negative and legionella ag negative  Plan:   -CTX 2g q24h    GI   #Constipation  Last bowel movement 10/10  -Start Miralax and Senna    RENAL  #NERIS  Baseline Cr 0.74 documented from 07/2024. on ACEi and with superimposed sepsis 2/2 UTI at this time likely contributing to kidney injury. Hypovolemic on exam likely prerenal etiology iso poor PO intake for past 5 days,   Cr improved s/p fluids   - Trend BUN/Cr   - Holding nephrotoxic meds including home lisinopril     #Hyponatremia   Na 129-> 126 on admission after 1.55L NS bolus, asymptomatic at this time.  Clinically hypovolemic on exam w/ poor PO intake for past 5 days iso generalized weakness and malaise iso septic and hypovolemic shock 2/2 UTI; pt additionally on diuretic with poor PO intake which may have contributed to hypovolemic hyponatremia   Urine studies consistent with hypovolemic hypo-osmolar hyponatremia.  - Q4H BMPs with urine lytes   - 500cc NS over 1 hour  - encourage po intake    HEME   RIAZ    ID   #septic shock 2/2 UTI   Grossly +UA as described above with urinary frequency/urgency   -F/u ucx, bcx -> NGTD  -Bladder scan to r/o retention   -CTX 2g QD. Adjust antibiotics to culture data as appropriate     #Enterovirus  Asymptomatic, no resp. complaints   -Supportive care    PROPHYLAXIS  Dvt ppx: SCD, heparin -> lovenox i/s/o improving renal function  PPI ppx: None  Diet: Regular   Dispo: MICU  Activity: Bedrest

## 2024-10-16 NOTE — CONSULT NOTE ADULT - SUBJECTIVE AND OBJECTIVE BOX
Patient is a 84y old  Female who presents with a chief complaint of Hypotension, UTI (16 Oct 2024 05:59)      HPI:  84F PMH recent Alzheimers diagnosis, HTN, mild AI presenting with 2 week duration of fatigue, malaise with associated increased urinary frequency, urgency, and incontinence. Patient's daughter and HCP, Rain, is present at bedside providing a majority of the history. States she has progressively been getting weaker w worsening urinary frequency that eventually led to an episode of urinary incontinence. When daughter came to visit patient today, pt was weak to the point of not being able to get out of the bath tub prompting her to come in to ED. UTI was diagnosed outpatient ~1 week ago but there was a delay in getting medication so did not start taking augmentin until 10/14. Reports pt has poor PO intake for past 5 days, unsteady gait, occasional fever/chills but otherwise denies falls, N/V/D, hematuria, numbness/tingling, headache/vision changes. Diagnosis of Alzheimer's after recent neurocognitive testing and pt was started on donepezil in September, which daughter stopped because she felt it was making her condition worse.     Of note, patient was noted by patient's daughter to have "L arm weakness and drift" and subsequent stroke code was called, imaging was negative for acute process and decision was made for no intervention.     ED:   VS: T 101.4, , /67, RR 16, 95% on RA   Labs: WBC 28, H/H 10.6/32.4, Plts 190, Na 129, K 3.1, BUN/Cr 48/1.46, Trop 368, lactate 1.0, UA+ spec grav >1.03, mod leuk est, blood, 409 wbcs, 27 casts,   Imaging: CTA head/neck unremarkable for acute process, stenosis, ischemia, or hemorrhage  Intervention: 1.55L NS bolus, K 40 meq PO, 10 meq x3 IV, CTX 1g, Tylenol     Home Medications:  atorvastatin 40 mg oral tablet qd   hydroCHLOROthiazide 12.5 mg oral capsule every otherwise (MWF)   lisinopril 20 mg oral tablet qd   ASA 81 mg QD (15 Oct 2024 01:29)    PAST MEDICAL & SURGICAL HISTORY:  Alzheimer disease      HTN (hypertension)      HLD (hyperlipidemia)        MEDICATIONS  (STANDING):  aspirin  chewable 81 milliGRAM(s) Oral daily  cefTRIAXone   IVPB 2000 milliGRAM(s) IV Intermittent every 24 hours  chlorhexidine 2% Cloths 1 Application(s) Topical <User Schedule>  heparin   Injectable 5000 Unit(s) SubCutaneous every 12 hours    MEDICATIONS  (PRN):          Home Living Status :  lives alone in an elevator accessible apartment building ,  1-2 steps to enter           -  Home Services :  none             -  Family support : daughterFaye Clark     Baseline Functional Level Prior to Admission :             - ADL's/ IADL's :  independent          - Ambulatory status prior to admission :   walked with no assistive devices          FAMILY HISTORY:      CBC Full  -  ( 16 Oct 2024 05:30 )  WBC Count : 23.36 K/uL  RBC Count : 3.87 M/uL  Hemoglobin : 11.1 g/dL  Hematocrit : 33.8 %  Platelet Count - Automated : 233 K/uL  Mean Cell Volume : 87.3 fl  Mean Cell Hemoglobin : 28.7 pg  Mean Cell Hemoglobin Concentration : 32.8 gm/dL  Auto Neutrophil # : 21.70 K/uL  Auto Lymphocyte # : 0.63 K/uL  Auto Monocyte # : 0.82 K/uL  Auto Eosinophil # : 0.00 K/uL  Auto Basophil # : 0.00 K/uL  Auto Neutrophil % : 92.9 %  Auto Lymphocyte % : 2.7 %  Auto Monocyte % : 3.5 %  Auto Eosinophil % : 0.0 %  Auto Basophil % : 0.0 %      10-16    126[L]  |  98  |  27[H]  ----------------------------<  95  4.2   |  21[L]  |  1.18    Ca    7.6[L]      16 Oct 2024 05:30  Phos  2.9     10-16  Mg     2.4     10-16    TPro  5.3[L]  /  Alb  2.5[L]  /  TBili  0.5  /  DBili  x   /  AST  34  /  ALT  27  /  AlkPhos  96  10-16      Urinalysis Basic - ( 16 Oct 2024 05:30 )    Color: x / Appearance: x / SG: x / pH: x  Gluc: 95 mg/dL / Ketone: x  / Bili: x / Urobili: x   Blood: x / Protein: x / Nitrite: x   Leuk Esterase: x / RBC: x / WBC x   Sq Epi: x / Non Sq Epi: x / Bacteria: x        Radiology :     < from: CT Brain Stroke Protocol (10.14.24 @ 20:47) >    ACC: [id number] EXAM:  CT BRAIN STROKE PROTOCOL   ORDERED BY: MITCHEL CASTRO   DIRECTOR     PROCEDURE DATE:  10/14/2024          INTERPRETATION:  CLINICAL INFORMATION: Stroke Code left-sided weakness.    COMPARISON: None.    CONTRAST:  IV Contrast: NONE  Complications: None reported at time of study completion    TECHNIQUE:  Serial axial images were obtained from the skull base to the   vertex using multi-slice helical technique. Sagittal and coronal   reformats were obtained.    FINDINGS:    VENTRICLES AND SULCI: Normal in size and configuration.  INTRA-AXIAL: No mass effect, acute hemorrhage, or midline shift.  There   are periventricular and subcortical white matter hypodensities,   consistent with microvascular type changes.  EXTRA-AXIAL: No massor fluid collection. Basal cisterns are normal in   appearance. Dural calcification is seen along the left paramedian   parietal convexity.    VISUALIZED SINUSES:  Clear.  TYMPANOMASTOID CAVITIES:  Clear.  VISUALIZED ORBITS: Normal.  CALVARIUM: Intact.      IMPRESSION:  No acute intracranial hemorrhage, mass effect, or midline shift.      < from: CT Brain Perfusion Maps Stroke (10.14.24 @ 20:56) >  ACC: [id number] EXAM:  CT ANGIO BRAIN STROKE PROTC IC   ORDERED BY: MITCHEL CASTRO   DIRECTOR     ACC: [id number] EXAM:  CT ANGIO NECK STROKE PROTCL IC   ORDERED BY: MITCHEL CASTRO   DIRECTOR     ACC: [id number] EXAM:  CT BRAIN PERFUSION MAPS STROKE   ORDERED BY: MITCHEL CASTRO  DIRECTOR     PROCEDURE DATE:  10/14/2024          INTERPRETATION:  CLINICAL INFORMATION: Stroke Code. Left-sided weakness.   Please see concurrently dictated CT head.    COMPARISON: None.    CONTRAST:  IV Contrast: Isovue 370 (accession [id number]), Isovue 370 (accession   [id number]), IV contrast documented in unlinked concurrent exam (accession   [id number])  40 cc administered (accession [id number]), 80 cc administered   (accession [id number]), 40 cc administered (accession [id number])  60 cc   discarded (accession [id number]), 20 cc discarded (accession [id number]), 60   cc discarded (accession [id number])  Complications: None reported at time of study completion    TECHNIQUE: CT perfusion and CTA of the head and neck were performed   following the intravenous administration of IV contrast. MIP   reconstructions were performed on a separate workstation and reviewed.   RAPID software was utilized for perfusion analysis.    FINDINGS:    CT PERFUSION:    TECHNICAL LIMITATIONS: None.    CBF<30%/CORE INFARCTION: 0 mL  TMAX>6s/UNDERPERFUSED TISSUE: 0 mL  MISMATCH VOLUME/TISSUE AT RISK: 0 mL  MISMATCH RATIO: None.    MISCELLANEOUS: None.      CTA NECK:  Mild atherosclerotic changes diffusely including the carotid arteries.  AORTIC ARCH AND VISUALIZED GREAT VESSELS: Within normal limits.    RIGHT:  COMMON CAROTID ARTERY: No significant stenosis to the carotid bifurcation.  INTERNAL CAROTID ARTERY: No significant stenosis based on NASCET criteria.  VERTEBRAL ARTERY: Normal in course and caliber to theintracranial   circulation.    LEFT:  COMMON CAROTID ARTERY: No significant stenosis to the carotid bifurcation.  INTERNAL CAROTID ARTERY: No significant stenosis based on NASCET criteria.  VERTEBRAL ARTERY: Dominant left vertebral artery. Normal in course and   caliber to the intracranial circulation.    VISUALIZED LUNGS: Clear.    MISCELLANEOUS: Mildly limited by streak artifact from dental hardware.   Subcentimeter bilateral hypodense thyroid nodules. There is an additional   1.1 cm right-sided thyroid nodule.    CAROTID STENOSIS REFERENCE: Percent (%) stenosis is expressed in terms of   NASCET Criteria. (NASCET = 100x1-(N/D)). N=greatest narrowing. D=normal   distal diameter - MILD = <50% stenosis. - MODERATE = 50-69% stenosis. -   SEVERE= 70-89% stenosis. - HAIRLINE/CRITICAL = 90-99% stenosis. -   OCCLUDED = 100% stenosis.      CTA HEAD:    INTERNAL CAROTID ARTERIES: Bilateral petrous, precavernous, cavernous,   and supraclinoid regions are patent without significant stenosis.    [place] OF [facility]AN: No aneurysm identified. Tiny aneurysms can be beyond   the resolution of CTA technique.    ANTERIOR CEREBRAL ARTERIES: No significant stenosis or occlusion.  MIDDLE CEREBRAL ARTERIES: No significant stenosis or occlusion.  POSTERIOR CEREBRAL ARTERIES: No significant stenosis or occlusion.    DISTAL VERTEBRAL / BASILAR ARTERIES: No significant stenosis or occlusion.    VENOUS STRUCTURES: Visualized superficial and deep venous structures   appear patent.    MISCELLANEOUS: No other vascular abnormality is seen. Left-sided parietal   dural calcification versus calcified meningioma.      IMPRESSION:    CT PERFUSION:  Technical limitations: None.    Core infarction: 0 ml  Penumbra / tissue at risk for active ischemia: 0 ml    CTA NECK:  No evidence of significant stenosis or occlusion.  1.1 cm right-sided thyroid nodule and additional subcentimeter sized   left-sided thyroid nodule. Recommend dedicated ultrasound.    CTA HEAD:  No large vessel occlusion, significant stenosis or vascular abnormality   identified.            Review of Systems : per HPI         Vital Signs Last 24 Hrs  T(C): 37 (16 Oct 2024 06:06), Max: 37.5 (15 Oct 2024 21:57)  T(F): 98.6 (16 Oct 2024 06:06), Max: 99.5 (15 Oct 2024 21:57)  HR: 101 (16 Oct 2024 07:00) (69 - 106)  BP: 127/77 (16 Oct 2024 07:00) (94/79 - 127/77)  BP(mean): 98 (16 Oct 2024 07:00) (75 - 100)  RR: 32 (16 Oct 2024 07:00) (20 - 44)  SpO2: 95% (16 Oct 2024 07:00) (92% - 96%)    Parameters below as of 16 Oct 2024 07:00  Patient On (Oxygen Delivery Method): room air            Physical Exam:  in MICU , 84 y o woman lying comfortably in semi Menjivar's position , awake ,  NAD     Head: normocephalic , atraumatic    Eyes: PERRLA , EOMI , no nystagmus , sclera anicteric    ENT / FACE: discoloration of R. upper cheek s/p recent Mohs surgery , neg nasal discharge , uvula midline , no oropharyngeal erythema / exudate    Neck: supple , negative JVD , negative carotid bruits , no thyromegaly    Chest: CTA bilaterally     Cardiovascular: regular rate and rhythm , neg murmurs / rubs / gallops    Abdomen: soft , non distended , no tenderness to palpation in all 4 quadrants ,  normal bowel sounds     Lower Extremities: WWP , neg cyanosis /clubbing / edema      Neurologic Exam:     Alert and oriented to person , place , speech fluent w/o dysarthria , follows commands   Cranial Nerves:           II:                         pupils equal , round and reactive to light , visual fields intact         III/ IV/VI:             extraocular movements intact , neg nystagmus , neg ptosis        V:                        facial sensation intact , V1-3 normal        VII:                      face symmetric , no droop , normal eye closure and smile        VIII:                     hearing intact to finger rub bilaterally        IX and X:             no hoarseness , gag intact , palate/ uvula rise symmetrically        XI:                       SCM / trapezius strength intact bilateral        XII:                      no tongue deviation    Motor Exam:                  Right UE/LE  4-/5 ,slight R UE drift            Left UE/LE :  5/5     Sensation:         intact to light touch x 4 extremities                            no neglect or extinction on double simultaneous testing    DTR:           biceps/brachioradialis: equal                            patella/ankle: equal          neg Babinski     Coordination:            Finger to Nose:  R dys       Gait:  not tested         PM&R Impression: admitted for  2 week duration of fatigue, malaise with associated increased urinary frequency, urgency, and incontinence. r/o TIA v stroke     - septic shock    - no acute pathology on CT brain imaging , MRI pending         Recommendations / Plan:       1) Physical / Occupational therapy focusing on therapeutic exercises , equipment evaluation , bed mobility/transfer out of bed evaluation , progressive ambulation with assistive devices prn .    2) Current disposition plan recommendation:    acute rehab placement if MRI + acute infarct

## 2024-10-16 NOTE — PROGRESS NOTE ADULT - ASSESSMENT
84F PMH recent Alzheimers diagnosis, HTN, mild AI presenting with 2 week duration of fatigue, malaise with associated increased urinary frequency, urgency, and incontinence, ICU consulted for hypotension with concern for septic shock 2/2 UTI. Patient noted to have improvements in their hemodynamics, noted to no longer require pressors. Notable new consolidation on bedside pocus, evaluating for PNA as an additional source of infection.     NEURO  #L. sided pronator drift vs TIA   #Rule out stroke   - consider MRI   -Stroke recs: obtain TSH, LDL, A1c  -Q4H neuro checks   -Continue ASA 81mg   - PT following    #Alzheimer's dementia   recent diagnosis after undergoing neurocognitive testing, started on donepezil 09/2024 but discontinued by daughter after she felt mental status was worsening. currently AOx2 on exam which is baseline mental status per daughter   -Delirium precautions   -Holding home donepezil   -Avoid sedating agents     CARDS  #Septic vs hypovolemic shock   WBC 28, febrile to 101 iso grossly +UA (>400 WBC, mod leuk est) w/ refractory hypotension; MAPs remained 60-65 despite adequate fluid resuscitation 2.55L NS bolus; overall well appearing, well perfused and mentating  Bedside limited TTE completed in ED: EF ~45-50% , no effusion, no sig wall motion abnormalities   TTE w/ multiple segmental abnormalities, mildly reduced LV systolic function (EF 40-45%), mildly reduced RV systolic function, moderate AR.   -Maintain MAP >70 if possible given recent possible CVA, currently off of vasopressors   -f/u blood cultures  -f/u sputum cultures  -discuss TTE with cardiology  -CTX 2 g QD. Adjust antibiotics to culture data as appropriate     #HTN   home meds: lisinopril 20 mg QD, HCTZ   -Holding home meds iso shock. Resume as appropriate     #Elevated troponin  No CP prior to admission or now, but up trending troponin 368->449. EKG without ischemic changes; cardiology contacted in ED, likely 2/2 demand ischemia and cardiology admission not warranted; no significant wall motion abnormalities on limited TTE in ED as described above. Troponin peaked 10/14.   TTE w/ multiple segmental abnormalities, mildly reduced LV systolic function (EF 40-45%), mildly reduced RV systolic function, moderate AR.   -Serial EKGs     PULM  #PNA  Noted to have consolidation on bedside US  strep AG negative and legionella ag negative  Plan:   -CTX 2g q24h    GI   #Constipation  Last bowel movement 10/10  -Start Miralax and Senna    RENAL  #NERIS  Baseline Cr 0.74 documented from 07/2024. on ACEi and with superimposed sepsis 2/2 UTI at this time likely contributing to kidney injury. Hypovolemic on exam likely prerenal etiology iso poor PO intake for past 5 days,   Cr improved s/p fluids   - Trend BUN/Cr   - Holding nephrotoxic meds including home lisinopril     #Hyponatremia   Na 129-> 126 on admission after 1.55L NS bolus, asymptomatic at this time.  Clinically hypovolemic on exam w/ poor PO intake for past 5 days iso generalized weakness and malaise iso septic and hypovolemic shock 2/2 UTI; pt additionally on diuretic with poor PO intake which may have contributed to hypovolemic hyponatremia   Urine studies consistent with hypovolemic hypo-osmolar hyponatremia.  - Q4H BMPs with urine lytes   - 500cc NS over 1 hour  - encourage po intake    HEME   RIAZ    ID   #septic shock 2/2 UTI   Grossly +UA as described above with urinary frequency/urgency   -F/u ucx, bcx -> NGTD  -Bladder scan to r/o retention   -CTX 2g QD. Adjust antibiotics to culture data as appropriate     #Enterovirus  Asymptomatic, no resp. complaints   -Supportive care    PROPHYLAXIS  Dvt ppx: SCD, heparin -> lovenox i/s/o improving renal function  PPI ppx: None  Diet: Regular   Dispo: MICU  Activity: Bedrest   84F PMH recent Alzheimer's diagnosis, HTN, mild AI presenting with 2 week duration of fatigue, malaise with associated increased urinary frequency, urgency, and incontinence, ICU consulted for hypotension with concern for septic shock 2/2 UTI. Patient noted to have improvements in their hemodynamics, noted to no longer require pressors. Notable new consolidation on bedside pocus, evaluating for PNA as an additional source of infection.     NEURO  #L. sided pronator drift vs TIA   #Rule out stroke   - f/u MRI Head No contrast   -f/u TSH, LDL, A1c  -Q4H neuro checks   -ASA 81mg   - PT following    #Alzheimer's dementia   recent diagnosis after undergoing neurocognitive testing, started on donepezil 09/2024 but discontinued by daughter after she felt mental status was worsening.   currently AOx2 on exam which is baseline mental status per daughter   -Delirium precautions   -Holding home donepezil   -Avoid sedating agents     CARDS  #Septic vs hypovolemic shock (RESOLVED)  WBC 28, febrile to 101 iso grossly +UA (>400 WBC, mod leuk est) w/ refractory hypotension; MAPs remained 60-65 despite adequate fluid resuscitation 2.55L NS bolus; overall well appearing, well perfused and mentating  Bedside limited TTE completed in ED: EF ~45-50% , no effusion, no sig wall motion abnormalities   TTE w/ multiple segmental abnormalities, mildly reduced LV systolic function (EF 40-45%), mildly reduced RV systolic function, moderate AR.   Urine Culture from outside provider grew E.Coli  10/14 Urine culture at [facility] had normal urogenital tawnya  10/15 RVP Positive for Entero Rhinovirus   10/14 BCX NGTD @ 24 hrs     -Maintain MAP >70 if possible given recent possible CVA, currently off of vasopressors   -f/u blood cultures  -f/u sputum cultures  -discuss TTE with cardiology  -Ceftriaxone 2 g QD      #HTN   home meds: lisinopril 20 mg QD, HCTZ   BP in the last 24 hrs 99/56 - 143/66  -Holding home meds iso shock. Resume as appropriate     #Elevated troponin  No CP prior to admission or now, initially uptrending troponin 368->449, now downtrended to 301.  EKG without ischemic changes; cardiology contacted in ED, likely 2/2 demand ischemia and cardiology admission not warranted; no significant wall motion abnormalities on limited TTE in ED as described above. Troponin peaked 10/14.   TTE w/ multiple segmental abnormalities, mildly reduced LV systolic function (EF 40-45%), mildly reduced RV systolic function, moderate AR.   -Serial EKGs     PULM  #PNA  Noted to have consolidation on bedside US  strep AG negative and legionella ag negative  Plan:   -CTX 2g q24h    GI   #Constipation  Last bowel movement 10/10  -Start Miralax and Senna    RENAL  #NERIS  Baseline Cr 0.74 documented from 07/2024. on ACEi and with superimposed sepsis 2/2 UTI at this time likely contributing to kidney injury. Hypovolemic on exam likely prerenal etiology iso poor PO intake for past 5 days,   Cr improved s/p fluids   - Trend BUN/Cr   - Holding nephrotoxic meds including home lisinopril     #Hyponatremia   Na 129-> 126 on admission after 1.55L NS bolus, asymptomatic at this time.  Clinically hypovolemic on exam w/ poor PO intake for past 5 days iso generalized weakness and malaise iso septic and hypovolemic shock 2/2 UTI; pt additionally on diuretic with poor PO intake which may have contributed to hypovolemic hyponatremia   Urine studies consistent with hypovolemic hypo-osmolar hyponatremia.  - Q4H BMPs with urine lytes   - 500cc NS over 1 hour  - encourage po intake    HEME   RIAZ    ID   #septic shock 2/2 UTI   Grossly +UA as described above with urinary frequency/urgency   -F/u ucx, bcx -> NGTD  -Bladder scan to r/o retention   -CTX 2g QD. Adjust antibiotics to culture data as appropriate     #Enterovirus  Asymptomatic, no resp. complaints   -Supportive care    PROPHYLAXIS  Dvt ppx: SCD, heparin -> lovenox i/s/o improving renal function  PPI ppx: None  Diet: Regular   Dispo: MICU  Activity: Bedrest   84F PMH recent Alzheimer's diagnosis, HTN, mild AI presenting with 2 week duration of fatigue, malaise with associated increased urinary frequency, urgency, and incontinence, ICU consulted for hypotension with concern for septic shock 2/2 UTI. Patient noted to have improvements in their hemodynamics, noted to no longer require pressors. Notable new consolidation on bedside pocus, evaluating for PNA as an additional source of infection.     NEURO  #L. sided pronator drift vs TIA   #Rule out stroke   - f/u MRI Head No contrast   -f/u TSH, LDL, A1c  -Q4H neuro checks   -ASA 81mg   - PT following    #Alzheimer's dementia   recent diagnosis after undergoing neurocognitive testing, started on donepezil 09/2024 but discontinued by daughter after she felt mental status was worsening.   currently AOx2 on exam which is baseline mental status per daughter   -Delirium precautions   -Holding home donepezil   -Avoid sedating agents     CARDS  #Septic vs hypovolemic shock (RESOLVED)  WBC 28, febrile to 101 iso grossly +UA (>400 WBC, mod leuk est) w/ refractory hypotension; MAPs remained 60-65 despite adequate fluid resuscitation 2.55L NS bolus; overall well appearing, well perfused and mentating  Bedside limited TTE completed in ED: EF ~45-50% , no effusion, no sig wall motion abnormalities   TTE w/ multiple segmental abnormalities, mildly reduced LV systolic function (EF 40-45%), mildly reduced RV systolic function, moderate AR.   Urine Culture from outside provider grew E.Coli  10/14 Urine culture at [facility] had normal urogenital tawnya  10/15 RVP Positive for Entero Rhinovirus   10/14 BCX NGTD @ 24 hrs     -Maintain MAP >70 if possible given recent possible CVA, currently off of vasopressors   -f/u blood cultures  -f/u sputum cultures  -discuss TTE with cardiology  -Ceftriaxone 2 g QD      #HTN   home meds: lisinopril 20 mg QD, HCTZ   BP in the last 24 hrs 99/56 - 143/66  -Holding home meds iso shock. Resume as appropriate     #Elevated troponin  No CP prior to admission or now, initially uptrending troponin 368->449, now downtrended to 301.  EKG without ischemic changes; cardiology contacted in ED, likely 2/2 demand ischemia and cardiology admission not warranted; no significant wall motion abnormalities on limited TTE in ED as described above. Troponin peaked 10/14.   TTE w/ multiple segmental abnormalities, mildly reduced LV systolic function (EF 40-45%), mildly reduced RV systolic function, moderate AR.   -Serial EKGs     PULM  #PNA  12/16 POCUS: The ultrasound findings indicate bilateral pleural effusions on both the right and left sides, with consolidation identified in the right lower lobe. The diagnosis includes pleural effusion on both sides and pneumonia located specifically in the right lower lobe.  strep AG negative and legionella ag negative  Plan:   --Ceftriaxone 2 g QD      GI   #Constipation  Last bowel movement 10/10  -Miralax and Senna    RENAL  #NERIS  Baseline Cr 0.74 documented from 07/2024. on ACEi and with superimposed sepsis 2/2 UTI at this time likely contributing to kidney injury. Hypovolemic on exam likely prerenal etiology iso poor PO intake for past 5 days,   BUN: 48 > 45> 36> 27  Cr: 1.46 > 1.42 > 1.28>1.18  Cr an Bun downtrending    - ctm BUN/Cr   - Holding nephrotoxic meds including home lisinopril     #Hyponatremia   Na 129-> 126 on admission after 1.55L NS bolus, asymptomatic at this time.  Clinically hypovolemic on exam w/ poor PO intake for past 5 days iso generalized weakness and malaise iso septic and hypovolemic shock 2/2 UTI; pt additionally on diuretic with poor PO intake which may have contributed to hypovolemic hyponatremia   The urine studies show a urine sodium (Na) level of 20 mEq/L and a urine osmolality of 322 mOsm/kg.   these findings may suggest that the body is retaining both sodium and water, possibly due to a state of dehydration or volume depletion  10/16 s/p 500cc NS over 1 hour  10/16 Na of 126   -f/u am BMP  - encourage po intake    HEME   RIAZ    ID   #septic shock 2/2 UTI   Grossly +UA as described above with urinary frequency/urgency   Urine Culture from outside provider grew E.Coli  10/14 Urine culture at [facility] had normal urogenital tawnya  10/15 RVP Positive for Entero Rhinovirus   10/14 BCX NGTD @ 24 hrs     -f/u blood cultures  -f/u sputum cultures  -Ceftriaxone 2 g QD    #Enterovirus  Asymptomatic, no resp. complaints   -Supportive care    PROPHYLAXIS  Dvt ppx: SCD, heparin -> lovenox i/s/o improving renal function  PPI ppx: None  Diet: Regular   Dispo: RMF   Activity: Bedrest   84F PMH recent Alzheimer's diagnosis, HTN, mild AI presenting with 2 week duration of fatigue, malaise with associated increased urinary frequency, urgency, and incontinence, ICU consulted for hypotension with concern for septic shock 2/2 UTI. Patient noted to have improvements in their hemodynamics, noted to no longer require pressors. Notable new consolidation on bedside pocus, evaluating for PNA as an additional source of infection.     84-year-old female with a recent Alzheimer's diagnosis, HTN, and mild aortic insufficiency presented with two weeks of fatigue, malaise and urinary frequency with recent diagnosif of a UTI on 10/7 for which she started antibiotics on 10/14. In the ED she was febrile, tachycardic and hypotensive with leukocytosis. She was started on IV antibiotics and briefly required pressors and was admitted to the MICU for septic shock 2/2 UTI. There was concern for concomitant stroke due to L arm weakness,  however CT imagine negative and patient still pending MRI. TTE done on 10/15 showed a reduction in LVEF to 40-45% and cardiology was consulted for further evaluation. Bedside pocus found b/l pleural effusions and right lower lobe consolidation concerning  for PNA     NEURO  #L. sided pronator drift vs TIA   #Rule out stroke   - f/u MRI Head No contrast   -f/u TSH, LDL, A1c  -Q4H neuro checks   -ASA 81mg   - PT following    #Alzheimer's dementia   recent diagnosis after undergoing neurocognitive testing, started on donepezil 09/2024 but discontinued by daughter after she felt mental status was worsening.   currently AOx2 on exam which is baseline mental status per daughter   -Delirium precautions   -Holding home donepezil   -Avoid sedating agents     CARDS  #Septic vs hypovolemic shock (RESOLVED)  WBC 28, febrile to 101 iso grossly +UA (>400 WBC, mod leuk est) w/ refractory hypotension; MAPs remained 60-65 despite adequate fluid resuscitation 2.55L NS bolus; overall well appearing, well perfused and mentating  Bedside limited TTE completed in ED: EF ~45-50% , no effusion, no sig wall motion abnormalities   TTE w/ multiple segmental abnormalities, mildly reduced LV systolic function (EF 40-45%), mildly reduced RV systolic function, moderate AR.   Urine Culture from outside provider grew E.Coli  10/14 Urine culture at [facility] had normal urogenital tawnya  10/15 RVP Positive for Entero Rhinovirus   10/14 BCX NGTD @ 24 hrs     -Maintain MAP >70 if possible given recent possible CVA, currently off of vasopressors   -f/u blood cultures  -f/u sputum cultures  -Ceftriaxone 2 g QD      #HTN   home meds: lisinopril 20 mg QD, HCTZ   BP in the last 24 hrs 99/56 - 143/66  -Can resume home lisinopril at low dose 5 mg po daily starting 10/17 am if patient continues to improve clinically.     #Elevated troponin  # Reduced LV EF   No CP prior to admission or now, initially uptrending troponin 368->449, now downtrended to 301.   EKG without ischemic changes  Troponin elevation, likely demand ischemia 2/2 UTI & sepsis  TTE 10/15/2024: Mildly reduced left ventricular systolic function. (EF 45%). LVIDd 4.23cm. Mildly reduced RV systolic function. Normal atria. Moderate aortic regurgitation. No other significant valvular disease.. No evidence of pulmonary hypertension. No pericardial effusion.  suspect LV dysfunction likely seen in setting of Septic Cardiomyopathy, now resolving     -Per cardiology; will repeat limited Echo for LV assessment prior to starting GDMT.   -Cardiology following;  f/u reccs    PULM  #PNA  12/16 POCUS: The ultrasound findings indicate bilateral pleural effusions on both the right and left sides, with consolidation identified in the right lower lobe. The diagnosis includes pleural effusion on both sides and pneumonia located specifically in the right lower lobe.  strep AG negative and legionella ag negative  Plan:   --Ceftriaxone 2 g QD      GI   #Constipation  Last bowel movement 10/10  -Miralax and Senna    RENAL  #NERIS  Baseline Cr 0.74 documented from 07/2024. on ACEi and with superimposed sepsis 2/2 UTI at this time likely contributing to kidney injury. Hypovolemic on exam likely prerenal etiology iso poor PO intake for past 5 days,   BUN: 48 > 45> 36> 27  Cr: 1.46 > 1.42 > 1.28>1.18  Cr an Bun downtrending    - ctm BUN/Cr   - Holding nephrotoxic meds including home lisinopril     #Hyponatremia   Na 129-> 126 on admission after 1.55L NS bolus, asymptomatic at this time.  Clinically hypovolemic on exam w/ poor PO intake for past 5 days iso generalized weakness and malaise iso septic and hypovolemic shock 2/2 UTI; pt additionally on diuretic with poor PO intake which may have contributed to hypovolemic hyponatremia   The urine studies show a urine sodium (Na) level of 20 mEq/L and a urine osmolality of 322 mOsm/kg.   these findings may suggest that the body is retaining both sodium and water, possibly due to a state of dehydration or volume depletion  10/16 s/p 500cc NS over 1 hour  10/16 Na of 126   -f/u am BMP  - encourage po intake    HEME   RIAZ    ID   #septic shock 2/2 UTI   Grossly +UA as described above with urinary frequency/urgency   Urine Culture from outside provider grew E.Coli  10/14 Urine culture at [facility] had normal urogenital tawnya  10/15 RVP Positive for Entero Rhinovirus   10/14 BCX NGTD @ 24 hrs     -f/u blood cultures  -f/u sputum cultures  -Ceftriaxone 2 g QD    #Enterovirus  Asymptomatic, no resp. complaints   -Supportive care    PROPHYLAXIS  Dvt ppx: SCD, heparin -> lovenox i/s/o improving renal function  PPI ppx: None  Diet: Regular   Dispo: RMF   Activity: Bedrest     84-year-old female with a recent Alzheimer's diagnosis, HTN, and mild aortic insufficiency presented with two weeks of fatigue, malaise and urinary frequency with recent diagnosif of a UTI on 10/7 for which she started antibiotics on 10/14. In the ED she was febrile, tachycardic and hypotensive with leukocytosis. She was started on IV antibiotics and briefly required pressors and was admitted to the MICU for septic shock 2/2 UTI. There was concern for concomitant stroke due to L arm weakness,  however CT imagine negative and patient still pending MRI. TTE done on 10/15 showed a reduction in LVEF to 40-45% and cardiology was consulted for further evaluation. Bedside pocus found b/l pleural effusions and right lower lobe consolidation concerning  for PNA     NEURO  #L. sided pronator drift vs TIA   #Rule out stroke   - f/u MRI Head No contrast   -f/u TSH, LDL, A1c  -Q4H neuro checks   -ASA 81mg   - PT following    #Alzheimer's dementia   recent diagnosis after undergoing neurocognitive testing, started on donepezil 09/2024 but discontinued by daughter after she felt mental status was worsening.   currently AOx2 on exam which is baseline mental status per daughter   -Delirium precautions   -Holding home donepezil   -Avoid sedating agents     CARDS  #Septic vs hypovolemic shock (RESOLVED)  WBC 28, febrile to 101 iso grossly +UA (>400 WBC, mod leuk est) w/ refractory hypotension; MAPs remained 60-65 despite adequate fluid resuscitation 2.55L NS bolus; overall well appearing, well perfused and mentating  Bedside limited TTE completed in ED: EF ~45-50% , no effusion, no sig wall motion abnormalities   TTE w/ multiple segmental abnormalities, mildly reduced LV systolic function (EF 40-45%), mildly reduced RV systolic function, moderate AR.   Urine Culture from outside provider grew E.Coli  10/14 Urine culture at [facility] had normal urogenital tawnya  10/15 RVP Positive for Entero Rhinovirus   10/14 BCX NGTD @ 24 hrs     -Maintain MAP >70 if possible given recent possible CVA, currently off of vasopressors   -f/u blood cultures  -f/u sputum cultures  -Ceftriaxone 2 g QD      #HTN   home meds: lisinopril 20 mg QD, HCTZ   BP in the last 24 hrs 99/56 - 143/66  -Can resume home lisinopril at low dose 5 mg po daily starting 10/17 am if patient continues to improve clinically.     #Elevated troponin  # Reduced LV EF   No CP prior to admission or now, initially uptrending troponin 368->449, now downtrended to 301.   EKG without ischemic changes  Troponin elevation, likely demand ischemia 2/2 UTI & sepsis  TTE 10/15/2024: Mildly reduced left ventricular systolic function. (EF 45%). LVIDd 4.23cm. Mildly reduced RV systolic function. Normal atria. Moderate aortic regurgitation. No other significant valvular disease.. No evidence of pulmonary hypertension. No pericardial effusion.  suspect LV dysfunction likely seen in setting of Septic Cardiomyopathy, now resolving     -Per cardiology; will repeat limited Echo for LV assessment prior to starting GDMT.   -Cardiology following;  f/u reccs    PULM  #PNA  12/16 POCUS: The ultrasound findings indicate bilateral pleural effusions on both the right and left sides, with consolidation identified in the right lower lobe. The diagnosis includes pleural effusion on both sides and pneumonia located specifically in the right lower lobe.  strep AG negative and legionella ag negative  Plan:   --Ceftriaxone 2 g QD      GI   #Constipation  Last bowel movement 10/10  -Miralax and Senna    RENAL  #NERIS  Baseline Cr 0.74 documented from 07/2024. on ACEi and with superimposed sepsis 2/2 UTI at this time likely contributing to kidney injury. Hypovolemic on exam likely prerenal etiology iso poor PO intake for past 5 days,   BUN: 48 > 45> 36> 27  Cr: 1.46 > 1.42 > 1.28>1.18  Cr an Bun downtrending    - ctm BUN/Cr   - Holding nephrotoxic meds including home lisinopril     #Hyponatremia   Na 129-> 126 on admission after 1.55L NS bolus, asymptomatic at this time.  Clinically hypovolemic on exam w/ poor PO intake for past 5 days iso generalized weakness and malaise iso septic and hypovolemic shock 2/2 UTI; pt additionally on diuretic with poor PO intake which may have contributed to hypovolemic hyponatremia   The urine studies show a urine sodium (Na) level of 20 mEq/L and a urine osmolality of 322 mOsm/kg.   these findings may suggest that the body is retaining both sodium and water, possibly due to a state of dehydration or volume depletion  10/16 s/p 500cc NS over 1 hour  10/16 Na of 126   -f/u am BMP  - encourage po intake    HEME   RIAZ    ID   #septic shock 2/2 UTI   Grossly +UA as described above with urinary frequency/urgency   Urine Culture from outside provider grew E.Coli  10/14 Urine culture at [facility] had normal urogenital tawnya  10/15 RVP Positive for Entero Rhinovirus   10/14 BCX NGTD @ 24 hrs     -f/u blood cultures  -f/u sputum cultures  -Ceftriaxone 2 g QD    #Enterovirus  Asymptomatic, no resp. complaints   -Supportive care    PROPHYLAXIS  Dvt ppx: SCD, heparin -> lovenox i/s/o improving renal function  PPI ppx: None  Diet: Regular   Dispo: RMF   Activity: Bedrest     84-year-old female with a recent Alzheimer's diagnosis, HTN, and mild aortic insufficiency presented with two weeks of fatigue, malaise and urinary frequency with recent diagnosis of a UTI on 10/7 for which she started antibiotics on 10/14. In the ED she was febrile, tachycardic and hypotensive with leukocytosis. She was started on IV antibiotics and briefly required pressors and was admitted to the MICU for septic shock 2/2 UTI. There was concern for concomitant stroke due to L arm weakness,  however CT imagine negative and patient still pending MRI. TTE done on 10/15 showed a reduction in LVEF to 40-45% and cardiology was consulted for further evaluation. Bedside pocus found b/l pleural effusions and right lower lobe consolidation concerning  for PNA                                          84-year-old female with a recent Alzheimer's diagnosis, HTN, and mild aortic insufficiency presented with two weeks of fatigue, malaise and urinary frequency with recent diagnosis of a UTI on 10/7 for which she started antibiotics on 10/14. In the ED she was febrile, tachycardic and hypotensive with leukocytosis. She was started on IV antibiotics and briefly required pressors and was admitted to the MICU for septic shock 2/2 UTI. There was concern for concomitant stroke due to L arm weakness,  however CT imagine negative and patient still pending MRI. TTE done on 10/15 showed a reduction in LVEF to 40-45% and cardiology was consulted for further evaluation. Bedside pocus found b/l pleural effusions and right lower lobe consolidation concerning  for PNA

## 2024-10-16 NOTE — CONSULT NOTE ADULT - TIME BILLING
Emotional Support/Supportive Care and Clarification of Potential Disease Trajectory related to septic shock.  Exploration of GOC including advanced directives such as HCP designation and code status.
As above

## 2024-10-16 NOTE — PROCEDURE NOTE - NSUS ED ADDITIONAL DETAIL1 FT
Large consolidations in bilateral bases
Large right sided consolidation  Bilateral simple pleural effusions

## 2024-10-16 NOTE — PROCEDURE NOTE - NSUSCONSOLIDATION_ED_ALL
ANTICOAGULATION MANAGEMENT     Derek Stover 62 year old male is on warfarin with therapeutic INR result. (Goal INR 2.5-3.5)    Recent labs: (last 7 days)     03/05/24  0759   INR 3.1*       ASSESSMENT     Source(s): Chart Review and Patient/Caregiver Call     Warfarin doses taken: Warfarin taken as instructed  Diet: No new diet changes identified  Medication/supplement changes: None noted  New illness, injury, or hospitalization: No  Signs or symptoms of bleeding or clotting: No  Previous result: Therapeutic last 2(+) visits  Additional findings: None       PLAN     Recommended plan for no diet, medication or health factor changes affecting INR     Dosing Instructions: Continue your current warfarin dose with next INR in 6 weeks       Summary  As of 3/5/2024      Full warfarin instructions:  5 mg every Tue, Thu, Sat; 2.5 mg all other days   Next INR check:  4/16/2024               Telephone call with Roberto who verbalizes understanding and agrees to plan    Lab visit scheduled    Education provided:   Contact [phone]  with any changes, questions or concerns.     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

## 2024-10-16 NOTE — CONSULT NOTE ADULT - ASSESSMENT
Review of Studies:  TTE 10/15/2024: Mildly reduced left ventricular systolic function. (EF 45%). LVIDd 4.23cm. Mildly reduced RV systolic function. Normal atria. Moderate aortic regurgitation. No other significant valvular disease.. No evidence of pulmonary hypertension. No pericardial effusion.    Trop T HS 10/14 368 > 449 > 10/15: 301 85 yo F with a recent Alzheimer's diagnosis, HTN, and mild aortic insufficiency presented with two weeks of fatigue, malaise and urinary frequency with recent diagnosif of a UTI on 10/7 for which she started antibiotics on 10/14. InIn the ED she was febrile, tachycardic and hypotensive. She was started on IV antibiotics and briefly required pressors and was admitted to the MICU for septic shock 2/2 UTI. There was concern for concomitant stroke due to L arm weakness, CT imagine negative and still pending MRI. TTE done on 10/15 showed a reduction in LVEF to 40-45% and cardiology was consulted for further evaluation.     Primary Cardiologist: Dr. Benja Guadalupe (Last visit 7/30/2024).   Home meds: ASA 81, Atorvastatin 40mg, HCTZ 12.5 MWFSun, Lisinopril 20mg daily    Review of Studies:  TTE 10/15/2024: Mildly reduced left ventricular systolic function. (EF 45%). LVIDd 4.23cm. Mildly reduced RV systolic function. Normal atria. Moderate aortic regurgitation. No other significant valvular disease.. No evidence of pulmonary hypertension. No pericardial effusion.  TTE 10/5/2022: Mild aortic insufficiency.     EKG 7/30/2024 (outpatient): NSR HR 87, NSST  EKG 10/15/2024: NSR, HR 98, NSST. Relatively unchanged from EKG in 7/2024.     Trop T HS 10/14 368 > 449 > 10/15: 301    # Moderate Aortic Regurgitation (mild on TTE 2022)  # EF 45% (TTE 10/2024)  # HTN  Good functional status outpatient playing tennis 3x per week, never with any anginal equivalent. Regular follow up with outpatient cardiologist with management for her HTN. Stress EKG ~ 1 year prior reported normal. Mild AI noted on TTE in 2022, asymptomatic since that time.   - Reduction in EF likely in the setting of now resolving sepsis due to UTI (septic cardiomyopathy). No ischemic changes on EKG however with mild Troponin elevation on admission with peak HS trop 449, would recommend outpatient non-urgent ischemic evaluation.   - Repeat TTE outpatient   - Ensure good BP control in the setting of moderate AI. Re-start home medications as tolerated for SBP goal <130. Currently asymptomatic. Cr 1.2 (baseline 0.8mg/dL 2/2024)    #HLD  - Continue home Atorvastatin 40 mg daily    Thank you for the consultation. Cardiology will continue to follow.   Recommendations preliminary until plan discussed with attending and note co-signed.    Gayatri Molina, CV fellow 85 yo F with a recent Alzheimer's diagnosis, HTN, and mild aortic insufficiency presented with two weeks of fatigue, malaise and urinary frequency with recent diagnosif of a UTI on 10/7 for which she started antibiotics on 10/14. InIn the ED she was febrile, tachycardic and hypotensive. She was started on IV antibiotics and briefly required pressors and was admitted to the MICU for septic shock 2/2 UTI. There was concern for concomitant stroke due to L arm weakness, CT imagine negative and still pending MRI. TTE done on 10/15 showed a reduction in LVEF to 40-45% and cardiology was consulted for further evaluation.     Primary Cardiologist: Dr. Benja Guadalupe (Last visit 7/30/2024).   Home meds: ASA 81, Atorvastatin 40mg, HCTZ 12.5 MWFSun, Lisinopril 20mg daily    Review of Studies:  TTE 10/15/2024: Mildly reduced left ventricular systolic function. (EF 45%). LVIDd 4.23cm. Mildly reduced RV systolic function. Normal atria. Moderate aortic regurgitation. No other significant valvular disease.. No evidence of pulmonary hypertension. No pericardial effusion.  TTE 10/5/2022: Mild aortic insufficiency.     EKG 7/30/2024 (outpatient): NSR HR 87, NSST  EKG 10/15/2024: NSR, HR 98, NSST. Relatively unchanged from EKG in 7/2024.     Trop T HS 10/14 368 > 449 > 10/15: 301    # Moderate Aortic Regurgitation (mild on TTE 2022)  # EF 45% (TTE 10/2024)  # HTN  # Troponin elevation, likely demand ischemia 2/2 UTI & sepsis  Good functional status outpatient playing tennis 3x per week, never with any anginal equivalent. Regular follow up with outpatient cardiologist with management for her HTN. Stress EKG ~ 1 year prior reported normal. Mild AI noted on TTE in 2022, asymptomatic since that time.   - Reduction in EF likely in the setting of now resolving sepsis due to UTI (septic cardiomyopathy). No ischemic changes on EKG however with mild Troponin elevation on admission with peak HS trop 449, would recommend outpatient non-urgent ischemic evaluation.   - Repeat TTE outpatient   - Ensure good BP control in the setting of moderate AI. Re-start home medications as tolerated for SBP goal <130. Currently asymptomatic. Cr 1.2 (baseline 0.8mg/dL 2/2024)    #HLD  - Continue home Atorvastatin 40 mg daily    Thank you for the consultation. Cardiology will continue to follow.   Recommendations preliminary until plan discussed with attending and note co-signed.    Gayatri Molina, CV fellow

## 2024-10-16 NOTE — CONSULT NOTE ADULT - ATTENDING COMMENTS
MendiolaYaneth  [id number]  As above, this is an 85 y/o female with HTN, Alzheimer's disease and aortic insufficiency who presented with fatigue, weakness on the LUE with drift, increased urinary frequency and urgency.  Stroke code was called and neurology will be following.    -UTI to evaluate for hydronephrosis  -sepsis with septic shock, received IVF boluses, and is on levophed  -L upper extremity weakness  -mild malnutrition with temporal muscle wasting  -elevated troponin  -acute renal insufficiency  -hyponatremia  -hypokalemia  -acute renal insufficiency  >f/u with neuro, MRI per neurology  >maintain MAP 65 to 70 mmHg, titrate the levophed, f/u troponin  >NPO  >c/w ceftriaxone  >renal ultrasound  >f/u BMP  >SQH  This patient is critically ill, requires constant monitoring of BP and titration of pressors, may need other pressors, f/u labs  Time 60 mins
Patient is an 85 yo Female with a recent Alzheimer's Diagnosis, HTN, and Mild aortic insufficiency who presented with two weeks of fatigue, malaise and urinary frequency admitted with septic shock 2/2 UTI with Rhinovirus, with concern for acute Neurological event, pending MRI, Found to have biventricular dysfunction for which cardiology was consulted.      Primary Cardiologist: Dr. Benja Guadalupe (Last visit 7/30/2024).   Home meds: ASA 81, Atorvastatin 40mg, HCTZ 12.5 MWFSun, Lisinopril 20mg daily    Review of Studies:  - TTE 10/15/2024: Mildly reduced left ventricular systolic function. (EF 45%). LVIDd 4.23cm. Mildly reduced RV systolic function. Normal atria. Moderate aortic regurgitation. No other significant valvular disease.. No evidence of pulmonary hypertension. No pericardial effusion.  - TTE 10/5/2022: Mild aortic insufficiency.   - EKG 10/15/2024: NSR, HR 98, NSST. Relatively unchanged from EKG in 7/2024.   - EKG 7/30/2024 (outpatient): NSR HR 87, NSST    # Moderate Aortic Regurgitation (mild on TTE 2022)  # Biventricular Dysfunction EF 45% (TTE 10/2024)  # HTN  # Troponin elevation, likely demand ischemia 2/2 UTI & sepsis  - Patient has no known ASCVD. She follows with Dr Guadalupe whom she last saw in July. At that time she offered no complaints.   - Last outpatient Echo dates back to 2022. At that time she had normal BIV function. She reported Stress EKG ~ 1 year prior which was reportedly normal. Results are not available for review  - She is now admitted with Septic SHock 2/2 UTI, off Levo since 3 am on the 15th and hemodynamically stable  - Up until admission patient reported Good functional status, able to play tennis comfortably 3x per week, without exertional symptoms    - ROS is negative for chest pain, palpitation or other anginal symptoms on this admission  - ECG reviewed showing NSR with non Specific ST changed. Echo reviewed.  - At this time suspect LV dysfunction likely seen in setting of Septic Cardiomyopathy, now resolving   - As patient has been off Pressors >24 hrs with SBP at bedside in the 120's at the time of my assessment, recommend resuming home lisinopril at low dose 5 mg po daily starting 10/17 am if patient continues to improve clinically.   - Will aim to repeat limited Echo for LV assessment prior to starting GDMT.   - In the interim would maintain patient on ASA 81 mh po daily and high intensity statin  - Cardiology will cont to follow with you, please call with any questions

## 2024-10-16 NOTE — OCCUPATIONAL THERAPY INITIAL EVALUATION ADULT - PERTINENT HX OF CURRENT PROBLEM, REHAB EVAL
84F PMH recent Alzheimers diagnosis, HTN, mild AI presenting with 2 week duration of fatigue, malaise with associated increased urinary frequency, urgency, and incontinence, ICU consulted for hypotension with concern for septic shock 2/2 UTI 84F PMH recent Alzheimers diagnosis, HTN, mild AI presenting with 2 week duration of fatigue, malaise with associated increased urinary frequency, urgency, and incontinence, ICU consulted for hypotension with concern for septic shock 2/2 UTI.

## 2024-10-16 NOTE — PROGRESS NOTE ADULT - SUBJECTIVE AND OBJECTIVE BOX
CC: Patient is a 84y old  Female who presents with a chief complaint of Hypotension, UTI (16 Oct 2024 11:39)      INTERVAL EVENTS: PALMER    SUBJECTIVE / INTERVAL HPI: Patient seen and examined at bedside.     ROS: negative unless otherwise stated above.    VITAL SIGNS:  Vital Signs Last 24 Hrs  T(C): 36.7 (16 Oct 2024 14:55), Max: 37.5 (15 Oct 2024 21:57)  T(F): 98 (16 Oct 2024 14:55), Max: 99.5 (15 Oct 2024 21:57)  HR: 87 (16 Oct 2024 20:00) (69 - 106)  BP: 110/70 (16 Oct 2024 18:00) (99/56 - 143/66)  BP(mean): 78 (16 Oct 2024 18:00) (75 - 98)  RR: 36 (16 Oct 2024 20:00) (22 - 40)  SpO2: 96% (16 Oct 2024 20:00) (93% - 98%)    Parameters below as of 16 Oct 2024 18:00  Patient On (Oxygen Delivery Method): room air          10-15-24 @ 07:01  -  10-16-24 @ 07:00  --------------------------------------------------------  IN: 50 mL / OUT: 1550 mL / NET: -1500 mL    10-16-24 @ 07:01  -  10-16-24 @ 20:42  --------------------------------------------------------  IN: 550 mL / OUT: 600 mL / NET: -50 mL        PHYSICAL EXAM:    General: NAD  HEENT: MMM  Neck: supple  Cardiovascular: +S1/S2; RRR  Respiratory: CTA B/L; no W/R/R  Gastrointestinal: soft, NT/ND  Extremities: WWP; no edema, clubbing or cyanosis  Vascular: 2+ radial, DP/PT pulses B/L  Neurological: AAOx3; no focal deficits    MEDICATIONS:  MEDICATIONS  (STANDING):  aspirin  chewable 81 milliGRAM(s) Oral daily  atorvastatin 40 milliGRAM(s) Oral at bedtime  cefTRIAXone   IVPB 2000 milliGRAM(s) IV Intermittent every 24 hours  chlorhexidine 2% Cloths 1 Application(s) Topical <User Schedule>  enoxaparin Injectable 30 milliGRAM(s) SubCutaneous every 24 hours  polyethylene glycol 3350 17 Gram(s) Oral daily  senna 2 Tablet(s) Oral at bedtime  sodium chloride 0.9%. 500 milliLiter(s) (500 mL/Hr) IV Continuous <Continuous>    MEDICATIONS  (PRN):      ALLERGIES:  Allergies    No Known Allergies    Intolerances        LABS:                        11.1   23.36 )-----------( 233      ( 16 Oct 2024 05:30 )             33.8     10-16    126[L]  |  98  |  27[H]  ----------------------------<  95  4.2   |  21[L]  |  1.18    Ca    7.6[L]      16 Oct 2024 05:30  Phos  2.9     10-16  Mg     2.4     10-16    TPro  5.3[L]  /  Alb  2.5[L]  /  TBili  0.5  /  DBili  x   /  AST  34  /  ALT  27  /  AlkPhos  96  10-16      Urinalysis Basic - ( 16 Oct 2024 05:30 )    Color: x / Appearance: x / SG: x / pH: x  Gluc: 95 mg/dL / Ketone: x  / Bili: x / Urobili: x   Blood: x / Protein: x / Nitrite: x   Leuk Esterase: x / RBC: x / WBC x   Sq Epi: x / Non Sq Epi: x / Bacteria: x      CAPILLARY BLOOD GLUCOSE          RADIOLOGY & ADDITIONAL TESTS: Reviewed. CC: Patient is a 84y old  Female who presents with a chief complaint of Hypotension, UTI (16 Oct 2024 11:39)      ***Transfer from MICU to [facility]***    Hospital Course: An 84-year-old female with a recent Alzheimer's diagnosis, hypertension, and mild aortic insufficiency presented with a two-week history of fatigue, malaise, increased urinary frequency, a recent UTI (10/7) found to be in septic shock 2/2 to UTI. Her daughter, who is also her healthcare proxy, reported a progressive weakening and worsening urinary symptoms, leading to an episode of incontinence. On the day of admission, the patient was too weak to exit the bathtub, prompting an ED visit. Despite an outpatient diagnosis of a urinary tract infection (UTI) a week prior, there was a delay in receiving the prescribed Augmentin, which she started only two days before admission (10/14). Additionally, she exhibited poor oral intake, unsteady gait, and occasional fever/chills. Neurocognitive testing confirmed an Alzheimer's diagnosis, and donepezil was prescribed but discontinued by her daughter due to perceived worsening of the patient's condition. While in the ED the patient had notable left arm weakness, and a stroke code however CTA head/neck unremarkable for acute process, stenosis, ischemia, or hemorrhage or causes contributing to the weakness. In the ED, she was febrile (101.4) and tachycardic (110), hypotensive (109/67), WBC (28), UA positive for wbcs. She was started on CTX 1g and levophed initially then transitioned to ceftriaxone 2g q24h and taken off pressors once moved to the MICU. Patient currently admitted for management of septic shock 2/2 UTI, with no pressor requirements, pending improvement in clinical status. Of note, patient continues to have LUE weakness and pronator drift.      INTERVAL EVENTS: PALMER    SUBJECTIVE / INTERVAL HPI: Patient seen and examined at bedside.     ROS: negative unless otherwise stated above.    VITAL SIGNS:  Vital Signs Last 24 Hrs  T(C): 36.7 (16 Oct 2024 14:55), Max: 37.5 (15 Oct 2024 21:57)  T(F): 98 (16 Oct 2024 14:55), Max: 99.5 (15 Oct 2024 21:57)  HR: 87 (16 Oct 2024 20:00) (69 - 106)  BP: 110/70 (16 Oct 2024 18:00) (99/56 - 143/66)  BP(mean): 78 (16 Oct 2024 18:00) (75 - 98)  RR: 36 (16 Oct 2024 20:00) (22 - 40)  SpO2: 96% (16 Oct 2024 20:00) (93% - 98%)    Parameters below as of 16 Oct 2024 18:00  Patient On (Oxygen Delivery Method): room air          10-15-24 @ 07:01  -  10-16-24 @ 07:00  --------------------------------------------------------  IN: 50 mL / OUT: 1550 mL / NET: -1500 mL    10-16-24 @ 07:01  -  10-16-24 @ 20:42  --------------------------------------------------------  IN: 550 mL / OUT: 600 mL / NET: -50 mL        PHYSICAL EXAM:    General: NAD  HEENT: MMM  Neck: supple  Cardiovascular: +S1/S2; RRR  Respiratory: CTA B/L; no W/R/R  Gastrointestinal: soft, NT/ND  Extremities: WWP; no edema, clubbing or cyanosis  Vascular: 2+ radial, DP/PT pulses B/L  Neurological: AAOx3; no focal deficits    MEDICATIONS:  MEDICATIONS  (STANDING):  aspirin  chewable 81 milliGRAM(s) Oral daily  atorvastatin 40 milliGRAM(s) Oral at bedtime  cefTRIAXone   IVPB 2000 milliGRAM(s) IV Intermittent every 24 hours  chlorhexidine 2% Cloths 1 Application(s) Topical <User Schedule>  enoxaparin Injectable 30 milliGRAM(s) SubCutaneous every 24 hours  polyethylene glycol 3350 17 Gram(s) Oral daily  senna 2 Tablet(s) Oral at bedtime  sodium chloride 0.9%. 500 milliLiter(s) (500 mL/Hr) IV Continuous <Continuous>    MEDICATIONS  (PRN):      ALLERGIES:  Allergies    No Known Allergies    Intolerances        LABS:                        11.1   23.36 )-----------( 233      ( 16 Oct 2024 05:30 )             33.8     10-16    126[L]  |  98  |  27[H]  ----------------------------<  95  4.2   |  21[L]  |  1.18    Ca    7.6[L]      16 Oct 2024 05:30  Phos  2.9     10-16  Mg     2.4     10-16    TPro  5.3[L]  /  Alb  2.5[L]  /  TBili  0.5  /  DBili  x   /  AST  34  /  ALT  27  /  AlkPhos  96  10-16      Urinalysis Basic - ( 16 Oct 2024 05:30 )    Color: x / Appearance: x / SG: x / pH: x  Gluc: 95 mg/dL / Ketone: x  / Bili: x / Urobili: x   Blood: x / Protein: x / Nitrite: x   Leuk Esterase: x / RBC: x / WBC x   Sq Epi: x / Non Sq Epi: x / Bacteria: x      CAPILLARY BLOOD GLUCOSE          RADIOLOGY & ADDITIONAL TESTS: Reviewed. CC: Patient is a 84y old  Female who presents with a chief complaint of Hypotension, UTI (16 Oct 2024 11:39)      ***Transfer from MICU to [facility]***    Hospital Course: An 84-year-old female with a recent Alzheimer's diagnosis, hypertension, and mild aortic insufficiency presented with a two-week history of fatigue, malaise, increased urinary frequency, a recent UTI (10/7) found to be in septic shock 2/2 to UTI. Her daughter, who is also her healthcare proxy, reported a progressive weakening and worsening urinary symptoms, leading to an episode of incontinence. On the day of admission, the patient was too weak to exit the bathtub, prompting an ED visit. Despite an outpatient diagnosis of a urinary tract infection (UTI) a week prior, there was a delay in receiving the prescribed Augmentin, which she started only two days before admission (10/14). Additionally, she exhibited poor oral intake, unsteady gait, and occasional fever/chills. Neurocognitive testing confirmed an Alzheimer's diagnosis, and donepezil was prescribed but discontinued by her daughter due to perceived worsening of the patient's condition. While in the ED the patient had notable left arm weakness, and a stroke code however CTA head/neck unremarkable for acute process, stenosis, ischemia, or hemorrhage or causes contributing to the weakness. In the ED, she was febrile (101.4) and tachycardic (110), hypotensive (109/67), WBC (28), UA positive for wbcs. She was started on CTX 1g and levophed initially then transitioned to ceftriaxone 2g q24h and taken off pressors once moved to the MICU. Patient currently admitted for management of septic shock 2/2 UTI, with no pressor requirements, pending improvement in clinical status. Of note, patient continues to have LUE weakness and pronator drift.      INTERVAL EVENTS: PALMER    SUBJECTIVE / INTERVAL HPI: Patient seen and examined at bedside. Patient reports feeling "wiped out" and is tired. She denies fever, or chills. She denies urinary urgency, dysuria. She reports that she has not defecated since admission. Her appetite is okay.    ROS: negative unless otherwise stated above.    VITAL SIGNS:  Vital Signs Last 24 Hrs  T(C): 36.7 (16 Oct 2024 14:55), Max: 37.5 (15 Oct 2024 21:57)  T(F): 98 (16 Oct 2024 14:55), Max: 99.5 (15 Oct 2024 21:57)  HR: 87 (16 Oct 2024 20:00) (69 - 106)  BP: 110/70 (16 Oct 2024 18:00) (99/56 - 143/66)  BP(mean): 78 (16 Oct 2024 18:00) (75 - 98)  RR: 36 (16 Oct 2024 20:00) (22 - 40)  SpO2: 96% (16 Oct 2024 20:00) (93% - 98%)    Parameters below as of 16 Oct 2024 18:00  Patient On (Oxygen Delivery Method): room air          10-15-24 @ 07:01  -  10-16-24 @ 07:00  --------------------------------------------------------  IN: 50 mL / OUT: 1550 mL / NET: -1500 mL    10-16-24 @ 07:01  -  10-16-24 @ 20:42  --------------------------------------------------------  IN: 550 mL / OUT: 600 mL / NET: -50 mL        PHYSICAL EXAM:    General: NAD. Laying comfortably in bed. Pleasant demeanor.   HEENT: MMM  Neck: supple  Cardiovascular: +S1/S2; RRR  Respiratory: CTA B/L; no W/R/R  Gastrointestinal: soft, NT/ND, + bowel sounds  Extremities: WWP; trace pitting edema to the legs   Vascular: 2+ radial, DP/PT pulses B/L  Neurological: AAOx3; decreased strength raising her left arm on PE.      MEDICATIONS:  MEDICATIONS  (STANDING):  aspirin  chewable 81 milliGRAM(s) Oral daily  atorvastatin 40 milliGRAM(s) Oral at bedtime  cefTRIAXone   IVPB 2000 milliGRAM(s) IV Intermittent every 24 hours  chlorhexidine 2% Cloths 1 Application(s) Topical <User Schedule>  enoxaparin Injectable 30 milliGRAM(s) SubCutaneous every 24 hours  polyethylene glycol 3350 17 Gram(s) Oral daily  senna 2 Tablet(s) Oral at bedtime  sodium chloride 0.9%. 500 milliLiter(s) (500 mL/Hr) IV Continuous <Continuous>    MEDICATIONS  (PRN):      ALLERGIES:  Allergies    No Known Allergies    Intolerances        LABS:                        11.1   23.36 )-----------( 233      ( 16 Oct 2024 05:30 )             33.8     10-16    126[L]  |  98  |  27[H]  ----------------------------<  95  4.2   |  21[L]  |  1.18    Ca    7.6[L]      16 Oct 2024 05:30  Phos  2.9     10-16  Mg     2.4     10-16    TPro  5.3[L]  /  Alb  2.5[L]  /  TBili  0.5  /  DBili  x   /  AST  34  /  ALT  27  /  AlkPhos  96  10-16      Urinalysis Basic - ( 16 Oct 2024 05:30 )    Color: x / Appearance: x / SG: x / pH: x  Gluc: 95 mg/dL / Ketone: x  / Bili: x / Urobili: x   Blood: x / Protein: x / Nitrite: x   Leuk Esterase: x / RBC: x / WBC x   Sq Epi: x / Non Sq Epi: x / Bacteria: x      CAPILLARY BLOOD GLUCOSE          RADIOLOGY & ADDITIONAL TESTS: Reviewed. CC: Patient is a 84y old  Female who presents with a chief complaint of Hypotension, UTI (16 Oct 2024 11:39)      ***Transfer from MICU to [facility]***    Hospital Course: An 84-year-old female with a recent Alzheimer's diagnosis, hypertension, and mild aortic insufficiency presented with a two-week history of fatigue, malaise, increased urinary frequency, a recent UTI (10/7) found to be in septic shock 2/2 to UTI. Her daughter, who is also her healthcare proxy, reported a progressive weakening and worsening urinary symptoms, leading to an episode of incontinence. On the day of admission, the patient was too weak to exit the bathtub, prompting an ED visit. Despite an outpatient diagnosis of a urinary tract infection (UTI) a week prior, there was a delay in receiving the prescribed Augmentin, which she started only two days before admission (10/14). Additionally, she exhibited poor oral intake, unsteady gait, and occasional fever/chills. Neurocognitive testing confirmed an Alzheimer's diagnosis, and donepezil was prescribed but discontinued by her daughter due to perceived worsening of the patient's condition. While in the ED the patient had notable left arm weakness, and a stroke code however CTA head/neck unremarkable for acute process, stenosis, ischemia, or hemorrhage or causes contributing to the weakness. In the ED, she was febrile (101.4) and tachycardic (110), hypotensive (109/67), WBC (28), UA positive for wbcs. She was started on CTX 1g and levophed initially then transitioned to ceftriaxone 2g q24h and taken off pressors once moved to the MICU. Patient currently admitted for management of septic shock 2/2 UTI, with no pressor requirements, pending improvement in clinical status. Of note, patient continues to have LUE weakness and pronator drift.      INTERVAL EVENTS: PALMER    SUBJECTIVE / INTERVAL HPI: Patient seen and examined at bedside. Patient reports feeling "wiped out" and is tired. She denies fever, or chills. She denies urinary urgency, dysuria. She reports that she has not defecated since admission. Her appetite is okay.    ROS: negative unless otherwise stated above.    VITAL SIGNS:  Vital Signs Last 24 Hrs  T(C): 36.7 (16 Oct 2024 14:55), Max: 37.5 (15 Oct 2024 21:57)  T(F): 98 (16 Oct 2024 14:55), Max: 99.5 (15 Oct 2024 21:57)  HR: 87 (16 Oct 2024 20:00) (69 - 106)  BP: 110/70 (16 Oct 2024 18:00) (99/56 - 143/66)  BP(mean): 78 (16 Oct 2024 18:00) (75 - 98)  RR: 36 (16 Oct 2024 20:00) (22 - 40)  SpO2: 96% (16 Oct 2024 20:00) (93% - 98%)    Parameters below as of 16 Oct 2024 18:00  Patient On (Oxygen Delivery Method): room air          10-15-24 @ 07:01  -  10-16-24 @ 07:00  --------------------------------------------------------  IN: 50 mL / OUT: 1550 mL / NET: -1500 mL    10-16-24 @ 07:01  -  10-16-24 @ 20:42  --------------------------------------------------------  IN: 550 mL / OUT: 600 mL / NET: -50 mL        PHYSICAL EXAM:    General: NAD. Laying comfortably in bed. Pleasant demeanor.   HEENT: MMM  Neck: supple  Cardiovascular: +S1/S2; RRR  Respiratory: CTA B/L; no W/R/R  Gastrointestinal: soft, NT/ND, + bowel sounds  Extremities: WWP; trace pitting edema to the legs   Vascular: 2+ radial, DP/PT pulses B/L  Neurological: AAOx3; decreased strength raising her left arm on PE.      MEDICATIONS:  MEDICATIONS  (STANDING):  aspirin  chewable 81 milliGRAM(s) Oral daily  atorvastatin 40 milliGRAM(s) Oral at bedtime  cefTRIAXone   IVPB 2000 milliGRAM(s) IV Intermittent every 24 hours  chlorhexidine 2% Cloths 1 Application(s) Topical <User Schedule>  enoxaparin Injectable 30 milliGRAM(s) SubCutaneous every 24 hours  polyethylene glycol 3350 17 Gram(s) Oral daily  senna 2 Tablet(s) Oral at bedtime  sodium chloride 0.9%. 500 milliLiter(s) (500 mL/Hr) IV Continuous <Continuous>    MEDICATIONS  (PRN):      ALLERGIES:  Allergies    No Known Allergies    Intolerances        LABS:                        11.1   23.36 )-----------( 233      ( 16 Oct 2024 05:30 )             33.8     10-16    126[L]  |  98  |  27[H]  ----------------------------<  95  4.2   |  21[L]  |  1.18    Ca    7.6[L]      16 Oct 2024 05:30  Phos  2.9     10-16  Mg     2.4     10-16    TPro  5.3[L]  /  Alb  2.5[L]  /  TBili  0.5  /  DBili  x   /  AST  34  /  ALT  27  /  AlkPhos  96  10-16      Urinalysis Basic - ( 16 Oct 2024 05:30 )    Color: x / Appearance: x / SG: x / pH: x  Gluc: 95 mg/dL / Ketone: x  / Bili: x / Urobili: x   Blood: x / Protein: x / Nitrite: x   Leuk Esterase: x / RBC: x / WBC x   Sq Epi: x / Non Sq Epi: x / Bacteria: x      CAPILLARY BLOOD GLUCOSE          RADIOLOGY & ADDITIONAL TESTS: Reviewed.

## 2024-10-17 ENCOUNTER — TRANSCRIPTION ENCOUNTER (OUTPATIENT)
Age: 84
End: 2024-10-17

## 2024-10-17 DIAGNOSIS — N17.9 ACUTE KIDNEY FAILURE, UNSPECIFIED: ICD-10-CM

## 2024-10-17 DIAGNOSIS — K59.00 CONSTIPATION, UNSPECIFIED: ICD-10-CM

## 2024-10-17 LAB
A1C WITH ESTIMATED AVERAGE GLUCOSE RESULT: 5.6 % — SIGNIFICANT CHANGE UP (ref 4–5.6)
ACANTHOCYTES BLD QL SMEAR: SLIGHT — SIGNIFICANT CHANGE UP
ALBUMIN SERPL ELPH-MCNC: 2.2 G/DL — LOW (ref 3.3–5)
ALP SERPL-CCNC: 117 U/L — SIGNIFICANT CHANGE UP (ref 40–120)
ALT FLD-CCNC: 28 U/L — SIGNIFICANT CHANGE UP (ref 10–45)
ANION GAP SERPL CALC-SCNC: 6 MMOL/L — SIGNIFICANT CHANGE UP (ref 5–17)
AST SERPL-CCNC: 31 U/L — SIGNIFICANT CHANGE UP (ref 10–40)
BASOPHILS # BLD AUTO: 0 K/UL — SIGNIFICANT CHANGE UP (ref 0–0.2)
BASOPHILS NFR BLD AUTO: 0 % — SIGNIFICANT CHANGE UP (ref 0–2)
BILIRUB SERPL-MCNC: 0.4 MG/DL — SIGNIFICANT CHANGE UP (ref 0.2–1.2)
BLD GP AB SCN SERPL QL: NEGATIVE — SIGNIFICANT CHANGE UP
BUN SERPL-MCNC: 26 MG/DL — HIGH (ref 7–23)
BURR CELLS BLD QL SMEAR: PRESENT — SIGNIFICANT CHANGE UP
CALCIUM SERPL-MCNC: 7.8 MG/DL — LOW (ref 8.4–10.5)
CHLORIDE SERPL-SCNC: 99 MMOL/L — SIGNIFICANT CHANGE UP (ref 96–108)
CO2 SERPL-SCNC: 24 MMOL/L — SIGNIFICANT CHANGE UP (ref 22–31)
CREAT SERPL-MCNC: 1.11 MG/DL — SIGNIFICANT CHANGE UP (ref 0.5–1.3)
EGFR: 49 ML/MIN/1.73M2 — LOW
EOSINOPHIL # BLD AUTO: 0.13 K/UL — SIGNIFICANT CHANGE UP (ref 0–0.5)
EOSINOPHIL NFR BLD AUTO: 0.9 % — SIGNIFICANT CHANGE UP (ref 0–6)
ESTIMATED AVERAGE GLUCOSE: 114 MG/DL — SIGNIFICANT CHANGE UP (ref 68–114)
GIANT PLATELETS BLD QL SMEAR: PRESENT — SIGNIFICANT CHANGE UP
GLUCOSE SERPL-MCNC: 101 MG/DL — HIGH (ref 70–99)
HCT VFR BLD CALC: 35.5 % — SIGNIFICANT CHANGE UP (ref 34.5–45)
HGB BLD-MCNC: 11.6 G/DL — SIGNIFICANT CHANGE UP (ref 11.5–15.5)
LDLC SERPL DIRECT ASSAY-MCNC: 47 MG/DL — SIGNIFICANT CHANGE UP
LYMPHOCYTES # BLD AUTO: 0.39 K/UL — LOW (ref 1–3.3)
LYMPHOCYTES # BLD AUTO: 2.6 % — LOW (ref 13–44)
MAGNESIUM SERPL-MCNC: 2.4 MG/DL — SIGNIFICANT CHANGE UP (ref 1.6–2.6)
MANUAL SMEAR VERIFICATION: SIGNIFICANT CHANGE UP
MCHC RBC-ENTMCNC: 30.1 PG — SIGNIFICANT CHANGE UP (ref 27–34)
MCHC RBC-ENTMCNC: 32.7 GM/DL — SIGNIFICANT CHANGE UP (ref 32–36)
MCV RBC AUTO: 92 FL — SIGNIFICANT CHANGE UP (ref 80–100)
METAMYELOCYTES # FLD: 0.9 % — HIGH (ref 0–0)
MONOCYTES # BLD AUTO: 1.03 K/UL — HIGH (ref 0–0.9)
MONOCYTES NFR BLD AUTO: 6.9 % — SIGNIFICANT CHANGE UP (ref 2–14)
NEUTROPHILS # BLD AUTO: 12.82 K/UL — HIGH (ref 1.8–7.4)
NEUTROPHILS NFR BLD AUTO: 86.1 % — HIGH (ref 43–77)
OVALOCYTES BLD QL SMEAR: SLIGHT — SIGNIFICANT CHANGE UP
PHOSPHATE SERPL-MCNC: 2.6 MG/DL — SIGNIFICANT CHANGE UP (ref 2.5–4.5)
PLAT MORPH BLD: ABNORMAL
PLATELET # BLD AUTO: 257 K/UL — SIGNIFICANT CHANGE UP (ref 150–400)
POIKILOCYTOSIS BLD QL AUTO: SIGNIFICANT CHANGE UP
POLYCHROMASIA BLD QL SMEAR: SLIGHT — SIGNIFICANT CHANGE UP
POTASSIUM SERPL-MCNC: 3.8 MMOL/L — SIGNIFICANT CHANGE UP (ref 3.5–5.3)
POTASSIUM SERPL-SCNC: 3.8 MMOL/L — SIGNIFICANT CHANGE UP (ref 3.5–5.3)
PROT SERPL-MCNC: 5.3 G/DL — LOW (ref 6–8.3)
RBC # BLD: 3.86 M/UL — SIGNIFICANT CHANGE UP (ref 3.8–5.2)
RBC # FLD: 13.5 % — SIGNIFICANT CHANGE UP (ref 10.3–14.5)
RBC BLD AUTO: ABNORMAL
RH IG SCN BLD-IMP: POSITIVE — SIGNIFICANT CHANGE UP
SODIUM SERPL-SCNC: 129 MMOL/L — LOW (ref 135–145)
SPHEROCYTES BLD QL SMEAR: SLIGHT — SIGNIFICANT CHANGE UP
TSH SERPL-MCNC: 2.72 UIU/ML — SIGNIFICANT CHANGE UP (ref 0.27–4.2)
VARIANT LYMPHS # BLD: 2.6 % — SIGNIFICANT CHANGE UP (ref 0–6)
WBC # BLD: 14.89 K/UL — HIGH (ref 3.8–10.5)
WBC # FLD AUTO: 14.89 K/UL — HIGH (ref 3.8–10.5)

## 2024-10-17 PROCEDURE — 70551 MRI BRAIN STEM W/O DYE: CPT | Mod: 26

## 2024-10-17 PROCEDURE — 99233 SBSQ HOSP IP/OBS HIGH 50: CPT | Mod: GC

## 2024-10-17 RX ADMIN — Medication 5 MILLIGRAM(S): at 12:22

## 2024-10-17 RX ADMIN — ATORVASTATIN CALCIUM 40 MILLIGRAM(S): 10 TABLET, FILM COATED ORAL at 21:15

## 2024-10-17 RX ADMIN — ENOXAPARIN SODIUM 30 MILLIGRAM(S): 150 INJECTION SUBCUTANEOUS at 17:29

## 2024-10-17 NOTE — PROGRESS NOTE ADULT - TIME BILLING
Bedside exam and interview   Reviewed vitals, labs   Discussed patient's plan of care with housestaff   Documentation of encounter  Excludes teaching and separately reported services
Chart Review. Independent interpretation of diagnostic tests/labs/imaging. Patient encounter. Resident teaching/review.

## 2024-10-17 NOTE — PROGRESS NOTE ADULT - PROBLEM SELECTOR PLAN 8
Na 129-> 126 on admission after 1.55L NS bolus, asymptomatic at this time.  Clinically hypovolemic on exam w/ poor PO intake for past 5 days iso generalized weakness and malaise iso septic and hypovolemic shock 2/2 UTI; pt additionally on diuretic with poor PO intake which may have contributed to hypovolemic hyponatremia   The urine studies show a urine sodium (Na) level of 20 mEq/L and a urine osmolality of 322 mOsm/kg.   these findings may suggest that the body is retaining both sodium and water, possibly due to a state of dehydration or volume depletion  10/16 s/p 500cc NS over 1 hour  10/16 Na of 126   -f/u am BMP  - encourage po intake
Na 129-> 126 on admission after 1.55L NS bolus, asymptomatic at this time.  Clinically hypovolemic on exam w/ poor PO intake for past 5 days iso generalized weakness and malaise iso septic and hypovolemic shock 2/2 UTI; pt additionally on diuretic with poor PO intake which may have contributed to hypovolemic hyponatremia   The urine studies show a urine sodium (Na) level of 20 mEq/L and a urine osmolality of 322 mOsm/kg.   these findings may suggest that the body is retaining both sodium and water, possibly due to a state of dehydration or volume depletion  10/16 s/p 500cc NS over 1 hour  10/16 Na of 126   -f/u am BMP  - encourage po intake

## 2024-10-17 NOTE — PROGRESS NOTE ADULT - SUBJECTIVE AND OBJECTIVE BOX
MEDICATION MANAGEMENT CLINIC DM FOLLOW UP VISIT NOTE        PRIMARY CARE PHYSICIAN  Milagros Hare, DO     ATTENDING PHYSICIAN  Jorge Galeana, DO     PATIENT'S PHARMACY  [facility] (Fax: [phone]) - Insulin, pen needles and all oral medications   [facility] (Fax: [phone]) - Test strips and lancets     SUBJECTIVE/OBJECTIVE  [place] Pierce is a 40 year old female who presents to the [facility] for management of diabetes type 2.   The patient is following up from their last visit on 4/22/19 in which the following was done:     · Diabetes intervention:   ? Continue Humalog insulin before meals and add correction scale:  § Breakfast: Inject 25 units into skin before breakfast  § Lunch: Inject 28 units into skin before lunch  § Supper:Inject 30 units into skin before supper  ? Continue Lantus insulin to:  § Morning: Inject 42 units into skin daily in the morning  § Bedtime: Inject 42 units into skin daily at bedtime  ? Continue metformin 1000 mg tablets: Take 1 tablet by mouth daily before breakfast and supper  ? Test blood sugar 4 times daily before meals and bedtime    Of note, since patient’s last visit - Patient has seen Dr. Hare where Lantus insulin dose was increased to 44 units twice daily. Additionally, patient’s glucometer settings of time and date are incorrect.     When discussing changes to DM regimen, patient expresses sadness and frustration with potential changes. She describes feeling despite her efforts with diet, she is unsuccessful and her providers “keep changing things even though I am changing my food.”     Today the patient reports the following:   · Missed doses: denies missed doses due to help from home health nurse  · Blood glucose testing frequency: 4x/day (AC & HS) with help from home health nurse  · Episodes of hypoglycemia:  Denies episodes of hypoglycemia since last appointment  · Understands the s/sx of hypoglycemia and appropriate treatment:    ? Sx:   Fatigue, nausea, weakness, sweating  ? Tx:  Acknowledges sx to caregiver, and drinks juice  · Current symptoms:  ? Denies GI sx with metformin  ? Denies sx of hyperglycemia  · Diet: 3 meals/day, and bedtime snack  ? Breakfast:  2 breakfast burritos (eggs, sausage, tortillas), 1 cup milk 2-3x weekly, or water  ? Lunch:  Cheeseburger (4 slices bread - white bread), with water  ? Supper:  mac & cheese, mixed vegetables, with water  ? Snacks:  peanuts (unsalted & honey roasted), peach cobbler, 1 box [employer] over 2 days, currently avoiding cookies  ? Desserts:  see snacks  ? Beverages:  Water, milk, coffee (Greek Vanilla or Hazelnut creamer)  ? Tobacco: Up to 10 cig/day. Trying to stay busy by watching TV, talking on telephone, cleaning room, listening to radio to prevent her from smoking. Endorses desire to quit smoking due to health consequences, although has not noticed an effect yet.   · Exercise:  Denies routine physical activity. Tends to turn down walks outside by caregivers due to always feeling pressured to exercise to lose weight.   · Wellness/prevention visits (dentist, ophthalmologist, podiatrist):  ? Vision:   Denies blurred vision and floaters. Last eye exam June 2018; due  ? Podiatry:  Denies sx neuropathy in feet. Last podiatry exam Feb 2019. Denies sores/wounds on feet. Applies cream to feet 3x/week. Appt with podiatry this month.   ? Dental:  Denies dental pain. Last dental exam Jan 2019.      I have reviewed the meds and allergies as per Epic. Today, the patient has no other complaints and is here for management of the aforementioned disease states. The length of therapy is for 1 year.         Last POC A1C: 8.5% (4/10/19)    Blood Sugar Log: Patient’s glucometer timing is incorrect, however was adjusted to match patient schedule and actual testing times in chart below.     Date Breakfast Lunch Dinner Bedtime   1/15/2019 189 196 -- --   1/14/2019 199 209 202 184   1/13/2019 180 199 185 261    1/12/2019 247 227 136 121   1/11/2019 210 251 177 181   1/10/2019 222 234 222 175   1/9/2019 149 183 191 210   1/8/2019 131 188 204 277   1/7/2019 234 246 128    1/6/2019 133 140 116 254   AVERAGES 189.4 207.3 173.44 207      Immunizations: Up to date     ASSESSMENT/PLAN  Medications were screened for contraindications, cost minimization, dose optimization, drug-drug interactions, adverse drug reactions and allergies.  Patient was staffed with faculty physician and the following plan was implemented:    Diabetes:  Currently, patient’s A1c elevated above goal <7%, with most recent A1c of 8.5% on 4/10/19, which improved from 9.3% on 1/25/19. Average FBG elevated above FBG goal  mg/dL, with average FBG of 189 mg/dL over the last week. Her prandial BG elevated above goal, with BG of 207, 173 and 207 before lunch, supper and bedtime. Patient's portion sizes continue to be large in carbohydrate/starches and overall diet may be contributing to poor blood sugar control. Given elevated blood sugars at all meals and AM fasting, patient may benefit from increase of both Humalog and Lantus insulin doses. Given patient’s frustrations with change, small changes may be beneficial focusing on meal-time insulin at this visit. Also, patient may be a candidate for GLP1 agent due to fluctuations in her diet impacting blood sugar control and high doses of insulin. Today, patient describes a desire to minimize change and therefore discussion may be beneficial in future.  • Patient was encouraged to try to limit her portion sizes (ex: 1 sandwich with yogurt, instead of 2 sandwiches). Patient was encouraged to monitor her portion sizes of dessert-type foods, and encouraged to find healthier snack options (i.e. Rangel crackers).   • Patient encouraged to increase physical activity in a way she enjoys. Patient agreeable to idea of dancing ~10 minutes daily.  • INCREASE Humalog insulin to 27 units under the skin at breakfast, 30 units  at lunch, and 31 units at dinner. Add correction scale to all doses.  • CONTINUE Lantus insulin at 44 units under the skin two times daily.  • CONTINUE Metformin 1000mg tablets two times daily.   • Continue to test blood sugar 4 times daily before meals and bedtime.  • Next A1c: 7/10/2019    Prevention/Wellness: Patient is due for eye doctor appointment (last visit Jun 2018). Additionally, patient may experience overall health benefits from quitting smoking.  • Patient was encouraged to try to smoke a half cigarette instead of a whole to help cut back.    • Patient to schedule eye doctor appointment.        Patient Instructions        Humalog + Correction Scale  (applies to all meals) Lantus Metformin    Breakfast 27 units       Less than 80 - 2 units    Usual dose   131-150 + 2 units   151-170 + 4 units   171-190 + 6 units   191-210 + 8 units   211-230 +10 units   231-250 +12 units   Greater than 250 + 14 units    44 units 1 tablet   Lunch 30 units              Supper 31 units    1 tablet   Bedtime     44 units          Diabetes:   1. INCREASE Humalog insulin before meals and add correction scale:                    Breakfast: Inject 27 units into the skin before breakfast                    Lunch: Inject 30 units into the skin daily before lunch                    Supper: Inject 31 units into the skin daily before supper     2. Continue Lantus insulin twice daily:   Morning: Inject 44 units into skin daily in the morning  Bedtime: Inject 44 units into skin daily at bedtime     3. Continue Metformin 1000 mg tablets- Take 1 tablet by mouth daily before breakfast and supper    4. Test blood sugar 4 times daily before meals and bedtime     5. Try smoking ½ cigarette each time to take an active approach to quitting     6. Continue to monitor portion control with meals and snacks  · Watch portion sizes  · No Upper sorbian Vanilla Cream or Hazelnut creamer  · No cookies  · NO REGULAR SODA!    EDUCATION  Patient was  INTERVAL HPI/OVERNIGHT EVENTS:  Patient was seen and examined at bedside. As per nurse and patient, no o/n events, patient resting comfortably. No complaints at this time. Patient denies: fever, chills, dizziness, weakness, HA, Changes in vision, CP, palpitations, SOB, cough, N/V/D/C, dysuria, changes in bowel movements, LE edema. ROS otherwise negative.    VITAL SIGNS:  T(F): 98.3 (10-17-24 @ 05:52)  HR: 81 (10-17-24 @ 05:52)  BP: 138/80 (10-17-24 @ 05:52)  RR: 18 (10-17-24 @ 05:52)  SpO2: 91% (10-17-24 @ 05:52)  Wt(kg): --      10-16-24 @ 07:01  -  10-17-24 @ 07:00  --------------------------------------------------------  IN: 550 mL / OUT: 600 mL / NET: -50 mL        PHYSICAL EXAM:    Constitutional: resting comfortably in bed; NAD  HEENT: NC/AT, PERRL, EOMI, anicteric sclera, no nasal discharge; uvula midline, no oropharyngeal erythema or exudates; MMM  Neck: supple; no JVD or thyromegaly  Respiratory: CTA B/L; no W/R/R, no retractions  Cardiac: +S1/S2; RRR; no M/R/G; PMI non-displaced  Gastrointestinal: soft, NT/ND; no rebound or guarding; +BSx4  Genitourinary: normal external genitalia  Back: spine midline, no bony tenderness or step-offs; no CVAT B/L  Extremities: WWP, no clubbing or cyanosis; no peripheral edema  Musculoskeletal: NROM x4; no joint swelling, tenderness or erythema  Vascular: 2+ radial, DP/PT pulses B/L  Dermatologic: skin warm, dry and intact; no rashes, wounds, or scars  Lymphatic: no submandibular or cervical LAD  Neurologic: AAOx3; CNII-XII grossly intact; no focal deficits  Psychiatric: affect and characteristics of appearance, verbalizations, behaviors are appropriate, denies SI/HI/AH/VH    MEDICATIONS  (STANDING):  aspirin  chewable 81 milliGRAM(s) Oral daily  atorvastatin 40 milliGRAM(s) Oral at bedtime  cefTRIAXone   IVPB 2000 milliGRAM(s) IV Intermittent every 24 hours  enoxaparin Injectable 30 milliGRAM(s) SubCutaneous every 24 hours  polyethylene glycol 3350 17 Gram(s) Oral two times a day  senna 2 Tablet(s) Oral at bedtime    MEDICATIONS  (PRN):      Allergies    No Known Allergies    Intolerances        LABS:                        11.1   23.36 )-----------( 233      ( 16 Oct 2024 05:30 )             33.8     10-16    126[L]  |  98  |  27[H]  ----------------------------<  95  4.2   |  21[L]  |  1.18    Ca    7.6[L]      16 Oct 2024 05:30  Phos  2.9     10-16  Mg     2.4     10-16    TPro  5.3[L]  /  Alb  2.5[L]  /  TBili  0.5  /  DBili  x   /  AST  34  /  ALT  27  /  AlkPhos  96  10-16      Urinalysis Basic - ( 16 Oct 2024 05:30 )    Color: x / Appearance: x / SG: x / pH: x  Gluc: 95 mg/dL / Ketone: x  / Bili: x / Urobili: x   Blood: x / Protein: x / Nitrite: x   Leuk Esterase: x / RBC: x / WBC x   Sq Epi: x / Non Sq Epi: x / Bacteria: x        RADIOLOGY & ADDITIONAL TESTS:  Reviewed educated on today's treatment plan.  Printed education materials were given to the patient.  Patient verbalized understanding of of the topics discussed and was given the pharmacists telephone number to call in the event a question arises.      RETURN  The patient will return to clinic on Monday, Harini 10 at 11:15AM         TIME SPENT ON VISIT  60 minutes were spent in face-to-face discussion related to medical management of the patient's disease states.     Note: Patient is straight Medicaid, so visit was not billed as a medication management visit. Instead it was billed as a CMR/A through [facility]. This was visit number 1 of 4 allowed per rolling calendar year. First authorization was granted 05/17/2019.       INTERVAL HPI/OVERNIGHT EVENTS:  Patient was seen and examined at bedside. As per nurse and patient, no o/n events, patient resting comfortably. No complaints at this time. Patient denies: fever, chills, dizziness, weakness, HA, Changes in vision, CP, palpitations, SOB, cough, N/V/D/C, dysuria, changes in bowel movements, LE edema. ROS otherwise negative. Resolution of urinary sx. improving L UE weakness.     VITAL SIGNS:  T(F): 98.3 (10-17-24 @ 05:52)  HR: 81 (10-17-24 @ 05:52)  BP: 138/80 (10-17-24 @ 05:52)  RR: 18 (10-17-24 @ 05:52)  SpO2: 91% (10-17-24 @ 05:52)  Wt(kg): --      10-16-24 @ 07:01  -  10-17-24 @ 07:00  --------------------------------------------------------  IN: 550 mL / OUT: 600 mL / NET: -50 mL        PHYSICAL EXAM:    Constitutional: resting comfortably in bed; NAD  HEENT: NC/AT, PERRL, EOMI, anicteric sclera, no nasal discharge; uvula midline, no oropharyngeal erythema or exudates; MMM  Neck: supple; no JVD or thyromegaly  Respiratory: CTA B/L; no W/R/R, no retractions  Cardiac: +S1/S2; RRR; no M/R/G; PMI non-displaced  Gastrointestinal: soft, NT/ND; no rebound or guarding; +BSx4  Genitourinary: normal external genitalia  Back: spine midline, no bony tenderness or step-offs; no CVAT B/L  Extremities: WWP, no clubbing or cyanosis; no peripheral edema  Musculoskeletal: NROM x4; no joint swelling, tenderness or erythema  Vascular: 2+ radial, DP/PT pulses B/L  Dermatologic: skin warm, dry and intact; no rashes, wounds, or scars  Lymphatic: no submandibular or cervical LAD  Neurologic: AAOx3; CNII-XII grossly intact; no focal deficits  Psychiatric: affect and characteristics of appearance, verbalizations, behaviors are appropriate, denies SI/HI/AH/VH    MEDICATIONS  (STANDING):  aspirin  chewable 81 milliGRAM(s) Oral daily  atorvastatin 40 milliGRAM(s) Oral at bedtime  cefTRIAXone   IVPB 2000 milliGRAM(s) IV Intermittent every 24 hours  enoxaparin Injectable 30 milliGRAM(s) SubCutaneous every 24 hours  polyethylene glycol 3350 17 Gram(s) Oral two times a day  senna 2 Tablet(s) Oral at bedtime    MEDICATIONS  (PRN):      Allergies    No Known Allergies    Intolerances        LABS:                        11.1   23.36 )-----------( 233      ( 16 Oct 2024 05:30 )             33.8     10-16    126[L]  |  98  |  27[H]  ----------------------------<  95  4.2   |  21[L]  |  1.18    Ca    7.6[L]      16 Oct 2024 05:30  Phos  2.9     10-16  Mg     2.4     10-16    TPro  5.3[L]  /  Alb  2.5[L]  /  TBili  0.5  /  DBili  x   /  AST  34  /  ALT  27  /  AlkPhos  96  10-16      Urinalysis Basic - ( 16 Oct 2024 05:30 )    Color: x / Appearance: x / SG: x / pH: x  Gluc: 95 mg/dL / Ketone: x  / Bili: x / Urobili: x   Blood: x / Protein: x / Nitrite: x   Leuk Esterase: x / RBC: x / WBC x   Sq Epi: x / Non Sq Epi: x / Bacteria: x        RADIOLOGY & ADDITIONAL TESTS:  Reviewed INTERVAL HPI/OVERNIGHT EVENTS:  Patient was seen and examined at bedside. As per nurse and patient, no o/n events, patient resting comfortably. No complaints at this time. Patient denies: fever, chills, dizziness, weakness, HA, Changes in vision, CP, palpitations, SOB, cough, N/V/D/C, dysuria, changes in bowel movements, LE edema. ROS otherwise negative. Resolution of urinary sx. improving L UE weakness.     VITAL SIGNS:  T(F): 98.3 (10-17-24 @ 05:52)  HR: 81 (10-17-24 @ 05:52)  BP: 138/80 (10-17-24 @ 05:52)  RR: 18 (10-17-24 @ 05:52)  SpO2: 91% (10-17-24 @ 05:52)  Wt(kg): --      10-16-24 @ 07:01  -  10-17-24 @ 07:00  --------------------------------------------------------  IN: 550 mL / OUT: 600 mL / NET: -50 mL        PHYSICAL EXAM:    Constitutional: resting comfortably in bed; NAD  HEENT: NC/AT, PERRL, EOMI, anicteric sclera, no nasal discharge  Neck: supple  Respiratory: CTA B/L; no W/R/R, no retractions  Cardiac: +S1/S2; RRR; no M/R/G; PMI non-displaced  Gastrointestinal: soft, NT/ND; no rebound or guarding; +BSx4  Extremities: WWP, no clubbing or cyanosis; no peripheral edema  Musculoskeletal: NROM x4; no joint swelling, tenderness or erythema  Vascular: 2+ radial, DP/PT pulses B/L  Neurologic: AAOx3; CNII-XII grossly intact; no focal deficits      MEDICATIONS  (STANDING):  aspirin  chewable 81 milliGRAM(s) Oral daily  atorvastatin 40 milliGRAM(s) Oral at bedtime  cefTRIAXone   IVPB 2000 milliGRAM(s) IV Intermittent every 24 hours  enoxaparin Injectable 30 milliGRAM(s) SubCutaneous every 24 hours  polyethylene glycol 3350 17 Gram(s) Oral two times a day  senna 2 Tablet(s) Oral at bedtime    MEDICATIONS  (PRN):      Allergies    No Known Allergies    Intolerances        LABS:                        11.1   23.36 )-----------( 233      ( 16 Oct 2024 05:30 )             33.8     10-16    126[L]  |  98  |  27[H]  ----------------------------<  95  4.2   |  21[L]  |  1.18    Ca    7.6[L]      16 Oct 2024 05:30  Phos  2.9     10-16  Mg     2.4     10-16    TPro  5.3[L]  /  Alb  2.5[L]  /  TBili  0.5  /  DBili  x   /  AST  34  /  ALT  27  /  AlkPhos  96  10-16      Urinalysis Basic - ( 16 Oct 2024 05:30 )    Color: x / Appearance: x / SG: x / pH: x  Gluc: 95 mg/dL / Ketone: x  / Bili: x / Urobili: x   Blood: x / Protein: x / Nitrite: x   Leuk Esterase: x / RBC: x / WBC x   Sq Epi: x / Non Sq Epi: x / Bacteria: x        RADIOLOGY & ADDITIONAL TESTS:  Reviewed

## 2024-10-17 NOTE — DISCHARGE NOTE PROVIDER - CARE PROVIDER_API CALL
Can Guadalupe  NP in Adult Health  [address] [place] [address]  Phone: [phone]  Fax: [phone]  Established Patient  Follow Up Time: 1 week   Can Guadalupe  NP in Adult Health  [address] [place] [address]  Phone: [phone]  Fax: [phone]  Established Patient  Follow Up Time: 1 week    Bao Lafleur  NP in Primary Care Adult-Gerontology  [address] [address]  Phone: [phone]  Fax: [phone]  Scheduled Appointment: 10/21/2024 11:40 AM

## 2024-10-17 NOTE — PROGRESS NOTE ADULT - PROBLEM SELECTOR PLAN 9
Baseline Cr 0.74 documented from 07/2024.  Patient was on home Lisinopril 20 mg.    ACEi  with superimposed sepsis 2/2 UTI likely contributing to kidney injury.   Hypovolemic on admission  exam  NERIS likely prerenal etiology iso poor PO intake for past 5 days,   BUN: 48 > 45> 36> 27  Cr: 1.46 > 1.42 > 1.28>1.18  Cr an Bun downtrending    - ctm BUN/Cr   - Holding nephrotoxic meds including home lisinopril
Baseline Cr 0.74 documented from 07/2024.  Patient was on home Lisinopril 20 mg.    ACEi  with superimposed sepsis 2/2 UTI likely contributing to kidney injury.   Hypovolemic on admission  exam  NERIS likely prerenal etiology iso poor PO intake for past 5 days,   BUN: 48 > 45> 36> 27  Cr: 1.46 > 1.42 > 1.28>1.18  Cr an Bun downtrending    - ctm BUN/Cr   - Holding nephrotoxic meds including home lisinopril

## 2024-10-17 NOTE — DISCHARGE NOTE PROVIDER - PROVIDER TOKENS
PROVIDER:[TOKEN:[[id number]],FOLLOWUP:[1 week],ESTABLISHEDPATIENT:[T]] PROVIDER:[TOKEN:[[id number]],FOLLOWUP:[1 week],ESTABLISHEDPATIENT:[T]],PROVIDER:[TOKEN:[[id number]],SCHEDULEDAPPT:[10/21/2024],SCHEDULEDAPPTTIME:[11:40 AM]]

## 2024-10-17 NOTE — PROGRESS NOTE ADULT - PROBLEM SELECTOR PLAN 3
12/16 POCUS: The ultrasound findings indicate bilateral pleural effusions on both the right and left sides, with consolidation identified in the right lower lobe. The diagnosis includes pleural effusion on both sides and pneumonia located specifically in the right lower lobe.  strep AG negative and legionella ag negative  Patient afebrile in past 24 hours, with no complaints of cough, breathing comfortably on room air,  however WBC still elevated at 23/36    -Ceftriaxone 2 g QD 12/16 POCUS: The ultrasound findings indicate bilateral pleural effusions on both the right and left sides, with consolidation identified in the right lower lobe. The diagnosis includes pleural effusion on both sides and pneumonia located specifically in the right lower lobe.  strep AG negative and legionella ag negative  Patient afebrile in past 24 hours, with no complaints of cough, breathing comfortably on room air,  however WBC still elevated at 23/36    -Ceftriaxone 2 g QD x5 days

## 2024-10-17 NOTE — PROGRESS NOTE ADULT - ATTENDING COMMENTS
Patient presenting with urinary symptoms and septic shock. Off of vasopressors early this AM with ultrasound also showing bilateral consolidation so may also have pneumonia process. Seems to be responding to antibiotics with pan positive UA. Clinically is improving and will monitor intake and blood pressures as mentation is also improving but not completely back to baseline. Labs and imaging reviewed with high risk for worsening given age.
Patient admitted to medical ICU secondary to UTI vs pneumonia as cause of shock. Patient mentation much improved this AM with downtrending WBC count. Continues to remain off vasopressors and ECHO showing new slight reduction in cardiac function. Family reporting that patient is very high functioning so given this new change will consult cardiology, favor this secondary to her sepsis. Will plan to continue ceftriaxone for 5-7 days as wbc count continues to come down. Found to have pansensitive Ecoli on outpatient with cultures here negative.
Ptn clinically improving, afebrile, BP stable, leukocytosis significantly downtrending.  On ctx, plan for last dose tomorrow to finish 5d course for urosepsis.  Cardiology consulted for HFrEF EF 45% on TTE 10/15, suspect LV dysfunction likely iso septic cardiomyopathy, will aim to repeat TTE after infx resolves prior to starting any GDMT.  Restart ptn on low dose lisinopril 5mg today.  Ptn w new pronator drift noted in ED and stroke code called, pending MRB for further eval.  PT rec acute rehab on dispo if ptn MRB positive, otherwise rec BALDO.  Ptn hesitant about rehab and may just want to go home, SW to discuss w ptn and family.
85 yo F with PMHx Alzheimer dementia (AOx2 baseline), HTN, HLD, mild aortic insufficiency initially BIBEMS from home 10/14  with 2-week hx of urinary incontinence and 1-d hx of worsening confusion, admitted to MICU for septic shock 2/2 UTI vs viral/bacterial community-acquired PNA, now off pressors and stable for stepdown to [facility] for ongoing management.    At time of stepdown, VSS. Maintaining MAPs >65 off pressor support. Satting well on RA at rest. EKG, labs, imaging, and infectious work-up reviewed. Leukocytosis overall downtrending to 23k. Na remains 126-129. NERIS resolved. Infectious work-up to date remarkable for RVP positive for Entero/Rhinovirus however urine cx and blood cx NGTD. Of note, pt tx with abx for presumed UTI prior to admission with Augmentin which may explain sterile urine specimen. Abx expanded for PNA coverage given concern for consolidation on bedside POCUS in MICU. Clinically stable/improving.      [ ] Continue CTX 2g IV Q24   [ ] Cardiology consulted (Biventricular dysfunction on TTE 10/15)   - Continue ASA/Atorvastatin    [ ] Continue to hold home anti-HTN   - Resume Lisinopril at reduced dose 5mg QD on 10/17 AM    - Continue to hold HCTZ i/s/o hyponatremia   [ ] Repeat BMP s/p 500cc NS bolus in MICU*   [ ] Fall precautions   [ ] Aspiration precautions   [ ] PT evaluation

## 2024-10-17 NOTE — PROGRESS NOTE ADULT - PROBLEM SELECTOR PLAN 7
History of Hypertension   home meds: lisinopril 20 mg QD, HCTZ   BP in the last 24 hrs 99/56 - 143/66  -Per Cardiology, pt may resume home lisinopril at low dose 5 mg po daily starting 10/17 am if patient continues to improve clinically.
History of Hypertension   home meds: lisinopril 20 mg QD, HCTZ   BP in the last 24 hrs 99/56 - 143/66  -Per Cardiology, pt may resume home lisinopril at low dose 5 mg po daily starting 10/17 am if patient continues to improve clinically.

## 2024-10-17 NOTE — DISCHARGE NOTE PROVIDER - NSDCCPCAREPLAN_GEN_ALL_CORE_FT
PRINCIPAL DISCHARGE DIAGNOSIS  Diagnosis: Sepsis  Assessment and Plan of Treatment: You were admitted for a severe infection of your urinary tract. You were treated with 5 days of an antiobiotic called ceftriaxone which has resolved the infection. The infection caused you to have low blood pressure, so we stopped your blood pressure medication hydrochlorothiazide and reduced your lisinopril dose to 5mg daily from 20mg daily. Please follow-up with  your primary care doctor within 1 week to evaluate how to restart your blood pressure medications.      SECONDARY DISCHARGE DIAGNOSES  Diagnosis: Acute UTI  Assessment and Plan of Treatment:     Diagnosis: CVA (cerebrovascular accident)  Assessment and Plan of Treatment:     Diagnosis: Demand ischemia  Assessment and Plan of Treatment:      PRINCIPAL DISCHARGE DIAGNOSIS  Diagnosis: Sepsis  Assessment and Plan of Treatment: You were admitted for a severe infection of your urinary tract. You were treated with 5 days of an antiobiotic called ceftriaxone which has resolved the infection. The infection caused you to have low blood pressure, so we stopped your blood pressure medication hydrochlorothiazide and reduced your lisinopril dose to 5mg daily from 20mg daily. Please follow-up with  your primary care doctor within 1 week to evaluate how to restart your blood pressure medications.      SECONDARY DISCHARGE DIAGNOSES  Diagnosis: Left-sided weakness  Assessment and Plan of Treatment: Pt had suddent onset left arm weakness which was evaluated for an acute stroke. In the ED the patient's daughter noticed L arm weakness and drift, which prompted a stroke code. No stroke acute intervention  given imaging was  negative and cause of weakness unknown  CT head showed: No acute hemorrhage or stroke; MRI Brain negative for acute stroke. CTA/CTP no LVO, stenosis, dissection. No penumbra/tissue at risk for active ischemia   Pt to continue ASA 81mg PO daily for secondary stroke prevention. Left arm weakness improving. No other focal neurological deficits observed on dischage.       Diagnosis: Acute UTI  Assessment and Plan of Treatment:     Diagnosis: CVA (cerebrovascular accident)  Assessment and Plan of Treatment:     Diagnosis: Demand ischemia  Assessment and Plan of Treatment:      PRINCIPAL DISCHARGE DIAGNOSIS  Diagnosis: Sepsis  Assessment and Plan of Treatment: You were admitted for a severe infection of your urinary tract. You were treated with 5 days of an antiobiotic called ceftriaxone which has resolved the infection. The infection caused you to have low blood pressure, so we stopped your blood pressure medication hydrochlorothiazide and reduced your lisinopril dose to 5mg daily from 20mg daily. Please follow-up with  your primary care doctor within 1 week to evaluate how to restart your blood pressure medications.      SECONDARY DISCHARGE DIAGNOSES  Diagnosis: Left-sided weakness  Assessment and Plan of Treatment: Pt had suddent onset left arm weakness which was evaluated for an acute stroke. In the ED the patient's daughter noticed L arm weakness and drift, which prompted a stroke code. No stroke acute intervention  given imaging was  negative and cause of weakness unknown  CT head showed: No acute hemorrhage or stroke; MRI Brain negative for acute stroke. CTA/CTP no LVO, stenosis, dissection. No penumbra/tissue at risk for active ischemia   Pt to continue ASA 81mg PO daily for secondary stroke prevention. Left arm weakness improving. No other focal neurological deficits observed on dischage. Continue with physical therapy at home to improve strength.       Diagnosis: Acute UTI  Assessment and Plan of Treatment:     Diagnosis: CVA (cerebrovascular accident)  Assessment and Plan of Treatment:     Diagnosis: Demand ischemia  Assessment and Plan of Treatment:

## 2024-10-17 NOTE — DISCHARGE NOTE PROVIDER - NSDCFUSCHEDAPPT_GEN_ALL_CORE_FT
Benja Guadalupe Physician [facility]  HEART[facility] [address]  Scheduled Appointment: 12/12/2024     Bao Lafleur  [facility] Physician [facility]  HEART[facility] [address]  Scheduled Appointment: 11/07/2024    Benja Guadalupe  [facility]  HEART[facility] [address]  Scheduled Appointment: 12/12/2024

## 2024-10-17 NOTE — PROGRESS NOTE ADULT - PROBLEM SELECTOR PLAN 11
Patient reported last bowel movement on 10/10  -Miralax and Senna Patient reported last bowel movement on 10/10  -Miralax and Senna, increased miralax to BID   -will escalate to bisacodyl supp if needed

## 2024-10-17 NOTE — PROGRESS NOTE ADULT - PROBLEM SELECTOR PLAN 6
Elevated Troponin on admission 368->449, however now downtrended to 301.   Patient did not report chest pain before or during admission   EKG without ischemic changes  Troponin elevation, likely demand ischemia 2/2 UTI & sepsis
Elevated Troponin on admission 368->449, however now downtrended to 301.   Patient did not report chest pain before or during admission   EKG without ischemic changes  Troponin elevation, likely demand ischemia 2/2 UTI & sepsis

## 2024-10-17 NOTE — PROGRESS NOTE ADULT - PROBLEM SELECTOR PLAN 10
Patient had recent diagnosis of Alzheimer's dementia  after undergoing neurocognitive testing. Was started on donepezil 09/2024 but discontinued by daughter after she felt mental status was worsening.   urrently AOx3 on exam which is baseline mental status per daughter   -Delirium precautions   -Holding home donepezil   -Avoid sedating agents
Patient had recent diagnosis of Alzheimer's dementia  after undergoing neurocognitive testing. Was started on donepezil 09/2024 but discontinued by daughter after she felt mental status was worsening.   urrently AOx3 on exam which is baseline mental status per daughter   -Delirium precautions   -Holding home donepezil   -Avoid sedating agents

## 2024-10-17 NOTE — DISCHARGE NOTE PROVIDER - CARE PROVIDERS DIRECT ADDRESSES
,DirectAddress_Unknown ,DirectAddress_Unknown,[email or web address]

## 2024-10-17 NOTE — PROGRESS NOTE ADULT - ASSESSMENT
84-year-old female with a recent Alzheimer's diagnosis, HTN, and mild aortic insufficiency presented with two weeks of fatigue, malaise and urinary frequency with recent diagnosis of a UTI on 10/7 for which she started antibiotics on 10/14. In the ED she was febrile, tachycardic and hypotensive with leukocytosis. She was started on IV antibiotics and briefly required pressors and was admitted to the MICU for septic shock 2/2 UTI. There was concern for concomitant stroke due to L arm weakness,  however CT imagine negative and patient still pending MRI. TTE done on 10/15 showed a reduction in LVEF to 40-45% and cardiology was consulted for further evaluation. Bedside pocus found b/l pleural effusions and right lower lobe consolidation concerning  for PNA

## 2024-10-17 NOTE — PROGRESS NOTE ADULT - PROBLEM SELECTOR PLAN 2
Patient with complaints of urinary urgency, and dysuria prior to admission. Had seen PCP who prescribed Augmentin (only taken for 2 days, delay in receiving script). Urine cultures from PCP grew E Coli.  Patient returned to ED due to chills and fever   grossly +UA (>400 WBC, mod leuk est)on admission  10/14 Urine culture at [facility] had normal urogenital tawnya  10/14 BCX NGTD @ 24 hrs    -f/u blood cultures  -Ceftriaxone 2 g QD
Patient with complaints of urinary urgency, and dysuria prior to admission. Had seen PCP who prescribed Augmentin (only taken for 2 days, delay in receiving script). Urine cultures from PCP grew E Coli.  Patient returned to ED due to chills and fever   grossly +UA (>400 WBC, mod leuk est)on admission  10/14 Urine culture at [facility] had normal urogenital tawnya  10/14 BCX NGTD @ 24 hrs    -f/u blood cultures  -Ceftriaxone 2 g QD

## 2024-10-17 NOTE — PROGRESS NOTE ADULT - PROBLEM SELECTOR PLAN 4
In the ED the patient's daughter noticed L arm weakness and drift, which prompted a stroke code.   No stroke acute intervention  given imaging was  negative and cause of weakness unknown  CT head showed: No acute hemorrhage or stroke   CTA/CTP no LVO, stenosis, dissection. No penumbra/tissue at risk for active ischemia     - continue ASA 81mg PO daily for Secondary stroke prevention  - f/u MRI brain without to r/o cerebellar stroke  -Neurology following; f/u reccs
In the ED the patient's daughter noticed L arm weakness and drift, which prompted a stroke code.   No stroke acute intervention  given imaging was  negative and cause of weakness unknown  CT head showed: No acute hemorrhage or stroke   CTA/CTP no LVO, stenosis, dissection. No penumbra/tissue at risk for active ischemia     - continue ASA 81mg PO daily for Secondary stroke prevention  - f/u MRI brain without to r/o cerebellar stroke  -Neurology following; f/u reccs

## 2024-10-17 NOTE — DISCHARGE NOTE PROVIDER - HOSPITAL COURSE
#Discharge: do not delete    84F [facility] recent Alzheimers diagnosis, HTN, mild AI presenting with 2 week duration of fatigue, malaise with associated increased urinary frequency, urgency, and incontinence, admitted to MICU for septic shock 2/2 UTI requiring vasopressors       Presented with _____, found to have _____    Problem List/Main Diagnoses (system-based):   #     #     #    Patient was discharged to: (home/BALDO/acute rehab/hospice, etc. and w/ home health/home PT/RN/home O2)    New medications:   Changes to old medications:  Medications that were stopped:    Items to follow up as outpatient:    Physical exam at the time of discharge:       LABS & STUDIES:  SARS-CoV-2: Matttec (15 Oct 2024 01:22)   #Discharge: do not delete  84-year-old female with a recent Alzheimer's diagnosis, HTN, and mild aortic insufficiency presented with two weeks of fatigue, malaise and urinary frequency with recent diagnosis of a UTI on 10/7 for which she started antibiotics on 10/14. Found to have urosepsis and admitted to MICU for septic shock.         Problem List/Main Diagnoses (system-based):   #  Septic shock.   Pt presented febrile, tachycardic, hypotensive with leukocytosis. Started on IV abx and briefly required pressors and was admitted to MICU for septic artemio 2/2 to UTI. Bedside pocus found b/l pleural effusions and RLL consolidation concerning for PNA. Pt had no oxygen requirements while in pt so low suspicion for PNA. On admission,  she was febrile (101.4) and tachycardic (110), hypotensive (109/67), WBC (28), and grossly +UA (>400 WBC, mod leuk est).      MAPs remained 60-65 despite adequate fluid resuscitation  Patient placed briefly on levophed in ED, and taken off pressors shortly after arriving at  septic shock 2/2 UTI, with no pressor requirements,  and hemodynamically stable  Urine Culture from outside provider grew E.Coli  10/14 Urine culture at [facility] had normal urogenital tawnya  10/15 RVP Positive for Entero Rhinovirus   10/14 BCX NGTD @ 24 hrs       #     #    Patient was discharged to: (home/BALDO/acute rehab/hospice, etc. and w/ home health/home PT/RN/home O2)    New medications:   Changes to old medications:  Medications that were stopped:    Items to follow up as outpatient:    Physical exam at the time of discharge:       LABS & STUDIES:  SARS-CoV-2: Hossein (15 Oct 2024 01:22)   #Discharge: do not delete  84-year-old female with a recent Alzheimer's diagnosis, HTN, and mild aortic insufficiency presented with two weeks of fatigue, malaise and urinary frequency with recent diagnosis of a UTI on 10/7 for which she started antibiotics on 10/14. Found to have urosepsis and admitted to MICU for septic shock.         Problem List/Main Diagnoses (system-based):   #  Septic shock, resolved   Pt presented febrile, tachycardic, hypotensive with leukocytosis. Started on IV abx and briefly required pressors and was admitted to MICU for septic artemio 2/2 to UTI. Bedside pocus found b/l pleural effusions and RLL consolidation concerning for PNA. Pt had no oxygen requirements while in pt so low suspicion for PNA. On admission,  she was febrile (101.4) and tachycardic (110), hypotensive (109/67), WBC (28), and grossly +UA (>400 WBC, mod leuk est).Urine Culture from outside provider grew E.Coli. Pt started on ceftriaxone and improved. BCX NGTD @ 72h. Pt hemodynamically stable. Leukocytosis resolved. s/p 5 days ceftriaxone.    # Suspected stroke, Muscle left arm weakness.   In the ED the patient's daughter noticed L arm weakness and drift, which prompted a stroke code.   No stroke acute intervention  given imaging was  negative and cause of weakness unknown  CT head showed: No acute hemorrhage or stroke; MRI Brain negative for acute stroke.   CTA/CTP no LVO, stenosis, dissection. No penumbra/tissue at risk for active ischemia   Pt to continue ASA 81mg PO daily for Secondary stroke prevention      Patient was discharged to: home     New medications: none  Changes to old medications: reduced lisinopril to 5mg daily from 10mg  Medications that were stopped: Stop hydrochlorothiazide as your blood pressure has been low. Please follow-up with your PCP about restarting the hydrocholorothiazide for bloo    Items to follow up as outpatient: Please follow-up with your PCP within 1 week     Physical exam at the time of discharge:   Constitutional: resting comfortably in bed; NAD  HEENT: NC/AT, PERRL, EOMI, anicteric sclera, no nasal discharge  Neck: supple  Respiratory: CTA B/L; no W/R/R, no retractions  Cardiac: +S1/S2; RRR; no M/R/G; PMI non-displaced  Gastrointestinal: soft, NT/ND; no rebound or guarding; +BSx4  Extremities: WWP, no clubbing or cyanosis; no peripheral edema  Musculoskeletal: NROM x4; no joint swelling, tenderness or erythema  Vascular: 2+ radial, DP/PT pulses B/L  Neurologic: AAOx3; CNII-XII grossly intact; no focal deficits, improving left arm weakness (4/5 strength)       LABS & STUDIES:  .  LABS:                         10.7   9.46  )-----------( 274      ( 18 Oct 2024 05:30 )             32.9     10-18    134[L]  |  103  |  19  ----------------------------<  93  4.0   |  23  |  0.97    Ca    7.8[L]      18 Oct 2024 05:30  Phos  3.1     10-18  Mg     2.4     10-18    TPro  5.3[L]  /  Alb  2.2[L]  /  TBili  0.4  /  DBili  x   /  AST  31  /  ALT  28  /  AlkPhos  117  10-17      Urinalysis Basic - ( 18 Oct 2024 05:30 )    Color: x / Appearance: x / SG: x / pH: x  Gluc: 93 mg/dL / Ketone: x  / Bili: x / Urobili: x   Blood: x / Protein: x / Nitrite: x   Leuk Esterase: x / RBC: x / WBC x   Sq Epi: x / Non Sq Epi: x / Bacteria: x           #Discharge: do not delete  84-year-old female with a recent Alzheimer's diagnosis, HTN, and mild aortic insufficiency presented with two weeks of fatigue, malaise and urinary frequency with recent diagnosis of a UTI on 10/7 for which she started antibiotics on 10/14. Found to have urosepsis and admitted to MICU for septic shock.         Problem List/Main Diagnoses (system-based):   #  Septic shock, resolved   Pt presented febrile, tachycardic, hypotensive with leukocytosis. Started on IV abx and briefly required pressors and was admitted to MICU for septic artemio 2/2 to UTI. Bedside pocus found b/l pleural effusions and RLL consolidation concerning for PNA. Pt had no oxygen requirements while in pt so low suspicion for PNA. On admission,  she was febrile (101.4) and tachycardic (110), hypotensive (109/67), WBC (28), and grossly +UA (>400 WBC, mod leuk est).Urine Culture from outside provider grew E.Coli. Pt started on ceftriaxone and improved. BCX NGTD @ 72h. Pt hemodynamically stable. Leukocytosis resolved. s/p 5 days ceftriaxone.    # Suspected stroke, Muscle left arm weakness.   In the ED the patient's daughter noticed L arm weakness and drift, which prompted a stroke code.   No stroke acute intervention  given imaging was  negative and cause of weakness unknown  CT head showed: No acute hemorrhage or stroke; MRI Brain negative for acute stroke.   CTA/CTP no LVO, stenosis, dissection. No penumbra/tissue at risk for active ischemia   Pt to continue ASA 81mg PO daily for Secondary stroke prevention      Patient was discharged to: home     New medications: aspirin 81mg daily   Changes to old medications: reduced lisinopril to 5mg daily from 10mg  Medications that were stopped: Stop hydrochlorothiazide as your blood pressure has been low. Please follow-up with your PCP about restarting the hydrocholorothiazide for bloo    Items to follow up as outpatient: Please follow-up with your PCP within 1 week     Physical exam at the time of discharge:   Constitutional: resting comfortably in bed; NAD  HEENT: NC/AT, PERRL, EOMI, anicteric sclera, no nasal discharge  Neck: supple  Respiratory: CTA B/L; no W/R/R, no retractions  Cardiac: +S1/S2; RRR; no M/R/G; PMI non-displaced  Gastrointestinal: soft, NT/ND; no rebound or guarding; +BSx4  Extremities: WWP, no clubbing or cyanosis; no peripheral edema  Musculoskeletal: NROM x4; no joint swelling, tenderness or erythema  Vascular: 2+ radial, DP/PT pulses B/L  Neurologic: AAOx3; CNII-XII grossly intact; no focal deficits, improving left arm weakness (4/5 strength)       LABS & STUDIES:  .  LABS:                         10.7   9.46  )-----------( 274      ( 18 Oct 2024 05:30 )             32.9     10-18    134[L]  |  103  |  19  ----------------------------<  93  4.0   |  23  |  0.97    Ca    7.8[L]      18 Oct 2024 05:30  Phos  3.1     10-18  Mg     2.4     10-18    TPro  5.3[L]  /  Alb  2.2[L]  /  TBili  0.4  /  DBili  x   /  AST  31  /  ALT  28  /  AlkPhos  117  10-17      Urinalysis Basic - ( 18 Oct 2024 05:30 )    Color: x / Appearance: x / SG: x / pH: x  Gluc: 93 mg/dL / Ketone: x  / Bili: x / Urobili: x   Blood: x / Protein: x / Nitrite: x   Leuk Esterase: x / RBC: x / WBC x   Sq Epi: x / Non Sq Epi: x / Bacteria: x

## 2024-10-17 NOTE — DISCHARGE NOTE PROVIDER - NSDCMRMEDTOKEN_GEN_ALL_CORE_FT
atorvastatin 40 mg oral tablet: 1 tab(s) orally  hydroCHLOROthiazide 12.5 mg oral capsule: 1 cap(s) orally  lisinopril 20 mg oral tablet: 1 tab(s) orally   atorvastatin 40 mg oral tablet: 1 tab(s) orally  hydroCHLOROthiazide 12.5 mg oral capsule: 1 cap(s) orally  lisinopril 5 mg oral tablet: 1 tab(s) orally every 24 hours   Aspirin Low Dose 81 mg oral tablet, chewable: 1 tab(s) orally once a day  atorvastatin 40 mg oral tablet: 1 tab(s) orally once a day (at bedtime)  lisinopril 5 mg oral tablet: 1 tab(s) orally every 24 hours

## 2024-10-17 NOTE — PROGRESS NOTE ADULT - PROBLEM SELECTOR PLAN 1
(Resolved)   septic shock 2/2 UTI vs Pneumonia  In the ED, she was febrile (101.4) and tachycardic (110), hypotensive (109/67), WBC (28), and grossly +UA (>400 WBC, mod leuk est)  MAPs remained 60-65 despite adequate fluid resuscitation  Patient placed briefly on levophed in ED, and taken off pressors shortly after arriving at  septic shock 2/2 UTI, with no pressor requirements,  and hemodynamically stable  Urine Culture from outside provider grew E.Coli  10/14 Urine culture at [facility] had normal urogenital tawnya  10/15 RVP Positive for Entero Rhinovirus   10/14 BCX NGTD @ 24 hrs     -Maintain MAP >70 if possible given recent possible CVA, currently off of vasopressors   -f/u blood cultures  -Ceftriaxone 2 g QD (Resolved)   septic shock 2/2 UTI vs Pneumonia  In the ED, she was febrile (101.4) and tachycardic (110), hypotensive (109/67), WBC (28), and grossly +UA (>400 WBC, mod leuk est)  MAPs remained 60-65 despite adequate fluid resuscitation  Patient placed briefly on levophed in ED, and taken off pressors shortly after arriving at  septic shock 2/2 UTI, with no pressor requirements,  and hemodynamically stable  Urine Culture from outside provider grew E.Coli  10/14 Urine culture at [facility] had normal urogenital tawnya  10/15 RVP Positive for Entero Rhinovirus   10/14 BCX NGTD @ 24 hrs     -Maintain MAP >70 if possible given recent possible CVA, currently off of vasopressors   -f/u blood cultures  -Ceftriaxone 2 g QD (last day 10/19 for 5 day course)

## 2024-10-17 NOTE — PROGRESS NOTE ADULT - PROBLEM SELECTOR PLAN 12
Dvt ppx: SCD, heparin -> lovenox i/s/o improving renal function  PPI ppx: None  Diet: Regular   Dispo: RMF   Activity: Bedres
Dvt ppx: SCD, heparin -> lovenox i/s/o improving renal function  PPI ppx: None  Diet: Regular   Dispo: RMF   Activity: Bedres

## 2024-10-17 NOTE — PROGRESS NOTE ADULT - PROBLEM SELECTOR PLAN 5
TTE 10/15/2024: Mildly reduced left ventricular systolic function. (EF 45%). LVIDd 4.23cm. Mildly reduced RV systolic function. Normal atria. Moderate aortic regurgitation. No other significant valvular disease.. No evidence of pulmonary hypertension. No pericardial effusion.  suspect LV dysfunction likely seen in setting of Septic Cardiomyopathy, now resolving    -Per cardiology; will repeat limited Echo for LV assessment prior to starting GDMT.   -Cardiology following;  f/u reccs
TTE 10/15/2024: Mildly reduced left ventricular systolic function. (EF 45%). LVIDd 4.23cm. Mildly reduced RV systolic function. Normal atria. Moderate aortic regurgitation. No other significant valvular disease.. No evidence of pulmonary hypertension. No pericardial effusion.  suspect LV dysfunction likely seen in setting of Septic Cardiomyopathy, now resolving    -Per cardiology; will repeat limited Echo for LV assessment prior to starting GDMT.   -Cardiology following;  f/u reccs

## 2024-10-17 NOTE — DISCHARGE NOTE PROVIDER - ATTENDING DISCHARGE PHYSICAL EXAMINATION:
Gen: NAD  HEENT: NCAT, MMM, clear OP  Neck: supple, trachea at midline  CV: RRR, +S1/S2  Pulm: adequate respiratory effort, no increased work of breathing  Abd: soft, NTND  Skin: warm and dry, no new rashes vs prior report  Ext: WWP  Neuro: AOx3, no gross focal neurological deficits  Psych: affect and behavior appropriate Gen: NAD  HEENT: NCAT, MMM, clear OP  Neck: supple, trachea at midline  CV: RRR, +S1/S2  Pulm: adequate respiratory effort, no increased work of breathing  Abd: soft, NTND  Skin: warm and dry, no new rashes vs prior report  Ext: WWP  Neuro: AOx3, no gross focal neurological deficits.  Psych: affect and behavior appropriate

## 2024-10-18 ENCOUNTER — TRANSCRIPTION ENCOUNTER (OUTPATIENT)
Age: 84
End: 2024-10-18

## 2024-10-18 VITALS
DIASTOLIC BLOOD PRESSURE: 76 MMHG | TEMPERATURE: 98 F | SYSTOLIC BLOOD PRESSURE: 124 MMHG | HEART RATE: 70 BPM | RESPIRATION RATE: 18 BRPM | OXYGEN SATURATION: 91 %

## 2024-10-18 LAB
ANION GAP SERPL CALC-SCNC: 8 MMOL/L — SIGNIFICANT CHANGE UP (ref 5–17)
BUN SERPL-MCNC: 19 MG/DL — SIGNIFICANT CHANGE UP (ref 7–23)
CALCIUM SERPL-MCNC: 7.8 MG/DL — LOW (ref 8.4–10.5)
CHLORIDE SERPL-SCNC: 103 MMOL/L — SIGNIFICANT CHANGE UP (ref 96–108)
CO2 SERPL-SCNC: 23 MMOL/L — SIGNIFICANT CHANGE UP (ref 22–31)
CREAT SERPL-MCNC: 0.97 MG/DL — SIGNIFICANT CHANGE UP (ref 0.5–1.3)
EGFR: 58 ML/MIN/1.73M2 — LOW
GLUCOSE SERPL-MCNC: 93 MG/DL — SIGNIFICANT CHANGE UP (ref 70–99)
HCT VFR BLD CALC: 32.9 % — LOW (ref 34.5–45)
HGB BLD-MCNC: 10.7 G/DL — LOW (ref 11.5–15.5)
MAGNESIUM SERPL-MCNC: 2.4 MG/DL — SIGNIFICANT CHANGE UP (ref 1.6–2.6)
MCHC RBC-ENTMCNC: 29.2 PG — SIGNIFICANT CHANGE UP (ref 27–34)
MCHC RBC-ENTMCNC: 32.5 GM/DL — SIGNIFICANT CHANGE UP (ref 32–36)
MCV RBC AUTO: 89.9 FL — SIGNIFICANT CHANGE UP (ref 80–100)
NRBC # BLD: 0 /100 WBCS — SIGNIFICANT CHANGE UP (ref 0–0)
PHOSPHATE SERPL-MCNC: 3.1 MG/DL — SIGNIFICANT CHANGE UP (ref 2.5–4.5)
PLATELET # BLD AUTO: 274 K/UL — SIGNIFICANT CHANGE UP (ref 150–400)
POTASSIUM SERPL-MCNC: 4 MMOL/L — SIGNIFICANT CHANGE UP (ref 3.5–5.3)
POTASSIUM SERPL-SCNC: 4 MMOL/L — SIGNIFICANT CHANGE UP (ref 3.5–5.3)
RBC # BLD: 3.66 M/UL — LOW (ref 3.8–5.2)
RBC # FLD: 13.2 % — SIGNIFICANT CHANGE UP (ref 10.3–14.5)
SODIUM SERPL-SCNC: 134 MMOL/L — LOW (ref 135–145)
WBC # BLD: 9.46 K/UL — SIGNIFICANT CHANGE UP (ref 3.8–10.5)
WBC # FLD AUTO: 9.46 K/UL — SIGNIFICANT CHANGE UP (ref 3.8–10.5)

## 2024-10-18 PROCEDURE — 99232 SBSQ HOSP IP/OBS MODERATE 35: CPT

## 2024-10-18 PROCEDURE — 99239 HOSP IP/OBS DSCHRG MGMT >30: CPT

## 2024-10-18 RX ORDER — ATORVASTATIN CALCIUM 10 MG/1
1 TABLET, FILM COATED ORAL
Refills: 0 | DISCHARGE

## 2024-10-18 RX ORDER — HYDROCHLOROTHIAZIDE 50 MG/1
1 TABLET ORAL
Refills: 0 | DISCHARGE

## 2024-10-18 RX ORDER — ASPIRIN 325 MG
1 TABLET ORAL
Qty: 30 | Refills: 0
Start: 2024-10-18 | End: 2024-11-16

## 2024-10-18 RX ORDER — ATORVASTATIN CALCIUM 10 MG/1
1 TABLET, FILM COATED ORAL
Qty: 0 | Refills: 0 | DISCHARGE
Start: 2024-10-18

## 2024-10-18 RX ADMIN — Medication 17 GRAM(S): at 05:46

## 2024-10-18 RX ADMIN — Medication 100 MILLIGRAM(S): at 11:14

## 2024-10-18 RX ADMIN — Medication 5 MILLIGRAM(S): at 11:14

## 2024-10-18 RX ADMIN — Medication 81 MILLIGRAM(S): at 11:14

## 2024-10-18 NOTE — PROGRESS NOTE ADULT - ASSESSMENT
85 yo F with a recent Alzheimer's diagnosis, HTN, and mild aortic insufficiency presented with two weeks of fatigue, malaise and urinary frequency with recent diagnosif of a UTI on 10/7 for which she started antibiotics on 10/14. InIn the ED she was febrile, tachycardic and hypotensive. She was started on IV antibiotics and briefly required pressors and was admitted to the MICU for septic shock 2/2 UTI. There was concern for concomitant stroke due to L arm weakness, CT imagine negative and still pending MRI. TTE done on 10/15 showed a reduction in LVEF to 40-45% and cardiology was consulted for further evaluation.     Primary Cardiologist: Dr. Benja Guadalupe (Last visit 7/30/2024).   Home meds: ASA 81, Atorvastatin 40mg, HCTZ 12.5 MWFSun, Lisinopril 20mg daily    Review of Studies:  TTE 10/15/2024: Mildly reduced left ventricular systolic function. (EF 45%). LVIDd 4.23cm. Mildly reduced RV systolic function. Normal atria. Moderate aortic regurgitation. No other significant valvular disease.. No evidence of pulmonary hypertension. No pericardial effusion.  TTE 10/5/2022: Mild aortic insufficiency.     EKG 7/30/2024 (outpatient): NSR HR 87, NSST  EKG 10/15/2024: NSR, HR 98, NSST. Relatively unchanged from EKG in 7/2024.     Trop T HS 10/14 368 > 449 > 10/15: 301    # Moderate Aortic Regurgitation (mild on TTE 2022)  # EF 45% (TTE 10/2024)  # HTN  # Troponin elevation, likely demand ischemia 2/2 UTI & sepsis  Good functional status outpatient playing tennis 3x per week, never with any anginal equivalent. Regular follow up with outpatient cardiologist with management for her HTN. Stress EKG ~ 1 year prior reported normal. Mild AI noted on TTE in 2022, asymptomatic since that time.   - Reduction in EF likely in the setting of now resolving sepsis due to UTI (septic cardiomyopathy). No ischemic changes on EKG however with mild Troponin elevation on admission with peak HS trop 449, would recommend outpatient non-urgent ischemic evaluation.   - Repeat TTE outpatient   GDMT:  BB: - Would initiate Metoprolol Succinate 25 mg daily , hold for HR < 60  RASi: Home Lisinopril re-started at low dose, 5mg daily. Up-titrate to home dose as tolerated, hold for SBP<110  Volume:  Euvolemic. Holding home HCTZ MWF    #HLD  - Continue home Atorvastatin 40 mg daily  - Continue home ASA 81mg daily    Please re-consult cardiology with any further questions. Please ensure outpatient follow up within 2 weeks with her cardiologist, Dr. Benja Guadalupe upon discharge.

## 2024-10-18 NOTE — PROGRESS NOTE ADULT - REASON FOR ADMISSION
Hypotension, UTI

## 2024-10-18 NOTE — DISCHARGE NOTE NURSING/CASE MANAGEMENT/SOCIAL WORK - NSDCPEFALRISK_GEN_ALL_CORE
For information on Fall & Injury Prevention, visit: [email or web address] OR  [email or web address] OR  https://www.cdc.gov/steadi/patient.html

## 2024-10-18 NOTE — PROGRESS NOTE ADULT - NS ATTEND AMEND GEN_ALL_CORE FT
85 yo lady PMHx of mild AI and HTN who was admitted for septic shock and new BiV HFrEF (EF 45%, mildly reduced RV systolic function, and moderate AI)    Assessment  1. New BiV HFrEF EF 45%, mildly reduced RV systolic function  2. Moderate AI  3. HsTrop elevation 2/2 type 2 MI 2/2 urosepsis - denied any chest pain    Plan  1. Ischemic eval w/ Dr. Guadalupe as outpatient, patient agrees w/ plan (CCTA vs nuclear stress), will update Dr. Guadalupe  2. GDMT: start metop succinate 25mg PO QD; continue lisinopril 5mg PO QD  3. No need for diuretics  4. Cardiology will sign off    During non face-to-face time, I reviewed relevant portions of the patient’s medical record. During face-to-face time, I took a relevant history and examined the patient. I also explained differential diagnoses, relevant cardiac diagnoses, workup, and management plan, which required a moderate level of medical decision making. I answered all questions related to the patient's medical conditions.     Al Keene M.D.  Attending Cardiologist  [facility]

## 2024-10-18 NOTE — PROGRESS NOTE ADULT - SUBJECTIVE AND OBJECTIVE BOX
INTERVAL EVENTS:    Cardiac medications administered in the last 48h:  lisinopril: 5 milliGRAM(s) Oral (10-18-24 @ 11:14)  lisinopril: 5 milliGRAM(s) Oral (10-17-24 @ 12:22)      MEDICATIONS  (STANDING):  aspirin  chewable 81 milliGRAM(s) Oral daily  atorvastatin 40 milliGRAM(s) Oral at bedtime  cefTRIAXone   IVPB 2000 milliGRAM(s) IV Intermittent every 24 hours  enoxaparin Injectable 30 milliGRAM(s) SubCutaneous every 24 hours  lisinopril 5 milliGRAM(s) Oral every 24 hours  polyethylene glycol 3350 17 Gram(s) Oral two times a day  senna 2 Tablet(s) Oral at bedtime    MEDICATIONS  (PRN):      VITALS 24H  T(F): 98.1 (10-18-24 @ 09:00), Max: 98.7 (10-17-24 @ 21:10)  HR: 73 (10-18-24 @ 11:15) (69 - 95)  BP: 122/65 (10-18-24 @ 11:15) (122/65 - 153/72)  BP(mean): --  ABP: --  ABP(mean): --  RR: 18 (10-18-24 @ 09:00) (18 - 20)  SpO2: 94% (10-18-24 @ 09:00) (94% - 97%)  CVP(mm Hg): --    I/O Detail 24H      PHYSICAL EXAM:  GEN: NAD  HEENT: EOMI   RESP: CTA b/l  CV: RRR. Normal S1/S2. No m/r/g.  ABD: soft, non-distended  EXT: No edema   NEURO: alert and attentive    LABS:  CBC 10-18-24 @ 05:30                        10.7   9.46  )-----------( 274                   32.9     Hgb trend: 10.7 <-- , 11.6 <-- , 11.1 <--   WBC trend: 9.46 <-- , 14.89 <-- , 23.36 <--       CMP 10-18-24 @ 05:30    134[L]  |  103  |  19  ----------------------------<  93  4.0   |  23  |  0.97    Ca    7.8[L]      10-18-24 @ 05:30  Phos  3.1     10-18  Mg     2.4     10-18    TPro  5.3[L]  /  Alb  2.2[L]  /  TBili  0.4  /  DBili  x   /  AST  31  /  ALT  28  /  AlkPhos  117     10-17    Serum Cr (eGFR) trend: 0.97 (58) <-- , 1.11 (49) <-- , 1.18 (46) <--           Cardiac Markers      INTERVAL EVENTS: Seen & examined at bedside. Denies shortness of breath, chest pain, or palpitations. She is eager to go home and feels upset that she has not been moving enough in hospital.     Cardiac medications administered in the last 48h:  lisinopril: 5 milliGRAM(s) Oral (10-18-24 @ 11:14)  lisinopril: 5 milliGRAM(s) Oral (10-17-24 @ 12:22)      MEDICATIONS  (STANDING):  aspirin  chewable 81 milliGRAM(s) Oral daily  atorvastatin 40 milliGRAM(s) Oral at bedtime  cefTRIAXone   IVPB 2000 milliGRAM(s) IV Intermittent every 24 hours  enoxaparin Injectable 30 milliGRAM(s) SubCutaneous every 24 hours  lisinopril 5 milliGRAM(s) Oral every 24 hours  polyethylene glycol 3350 17 Gram(s) Oral two times a day  senna 2 Tablet(s) Oral at bedtime    MEDICATIONS  (PRN):      VITALS 24H  T(F): 98.1 (10-18-24 @ 09:00), Max: 98.7 (10-17-24 @ 21:10)  HR: 73 (10-18-24 @ 11:15) (69 - 95)  BP: 122/65 (10-18-24 @ 11:15) (122/65 - 153/72)  BP(mean): --  ABP: --  ABP(mean): --  RR: 18 (10-18-24 @ 09:00) (18 - 20)  SpO2: 94% (10-18-24 @ 09:00) (94% - 97%)  CVP(mm Hg): --    I/O Detail 24H      PHYSICAL EXAM:  GEN: Awake, comfortable. NAD.  RESP: CTA b/l  CV: RRR, normal s1/s2. No m/r/g. No JVP elevation.   EXT: Warm. No edema, clubbing, or cyanosis.   NEURO: AAOx3. No focal deficits      LABS:  CBC 10-18-24 @ 05:30                        10.7   9.46  )-----------( 274                   32.9     Hgb trend: 10.7 <-- , 11.6 <-- , 11.1 <--   WBC trend: 9.46 <-- , 14.89 <-- , 23.36 <--       CMP 10-18-24 @ 05:30    134[L]  |  103  |  19  ----------------------------<  93  4.0   |  23  |  0.97    Ca    7.8[L]      10-18-24 @ 05:30  Phos  3.1     10-18  Mg     2.4     10-18    TPro  5.3[L]  /  Alb  2.2[L]  /  TBili  0.4  /  DBili  x   /  AST  31  /  ALT  28  /  AlkPhos  117     10-17    Serum Cr (eGFR) trend: 0.97 (58) <-- , 1.11 (49) <-- , 1.18 (46) <--           Cardiac Markers

## 2024-10-18 NOTE — PROGRESS NOTE ADULT - SUBJECTIVE AND OBJECTIVE BOX
Physical Medicine and Rehabilitation Progress Note :       Patient is a 84y old  Female who presents with a chief complaint of Hypotension, UTI (17 Oct 2024 14:14)      HPI:  84F PMH recent Alzheimers diagnosis, HTN, mild AI presenting with 2 week duration of fatigue, malaise with associated increased urinary frequency, urgency, and incontinence. Patient's daughter and HCP, Rain, is present at bedside providing a majority of the history. States she has progressively been getting weaker w worsening urinary frequency that eventually led to an episode of urinary incontinence. When daughter came to visit patient today, pt was weak to the point of not being able to get out of the bath tub prompting her to come in to ED. UTI was diagnosed outpatient ~1 week ago but there was a delay in getting medication so did not start taking augmentin until 10/14. Reports pt has poor PO intake for past 5 days, unsteady gait, occasional fever/chills but otherwise denies falls, N/V/D, hematuria, numbness/tingling, headache/vision changes. Diagnosis of Alzheimer's after recent neurocognitive testing and pt was started on donepezil in September, which daughter stopped because she felt it was making her condition worse.     Of note, patient was noted by patient's daughter to have "L arm weakness and drift" and subsequent stroke code was called, imaging was negative for acute process and decision was made for no intervention.     ED:   VS: T 101.4, , /67, RR 16, 95% on RA   Labs: WBC 28, H/H 10.6/32.4, Plts 190, Na 129, K 3.1, BUN/Cr 48/1.46, Trop 368, lactate 1.0, UA+ spec grav >1.03, mod leuk est, blood, 409 wbcs, 27 casts,   Imaging: CTA head/neck unremarkable for acute process, stenosis, ischemia, or hemorrhage  Intervention: 1.55L NS bolus, K 40 meq PO, 10 meq x3 IV, CTX 1g, Tylenol     Home Medications:  atorvastatin 40 mg oral tablet qd   hydroCHLOROthiazide 12.5 mg oral capsule every otherwise (MWF)   lisinopril 20 mg oral tablet qd   ASA 81 mg QD (15 Oct 2024 01:29)                            10.7   9.46  )-----------( 274      ( 18 Oct 2024 05:30 )             32.9       10-18    134[L]  |  103  |  19  ----------------------------<  93  4.0   |  23  |  0.97    Ca    7.8[L]      18 Oct 2024 05:30  Phos  3.1     10-18  Mg     2.4     10-18    TPro  5.3[L]  /  Alb  2.2[L]  /  TBili  0.4  /  DBili  x   /  AST  31  /  ALT  28  /  AlkPhos  117  10-17    Vital Signs Last 24 Hrs  T(C): 36.7 (18 Oct 2024 09:00), Max: 37.1 (17 Oct 2024 21:10)  T(F): 98.1 (18 Oct 2024 09:00), Max: 98.7 (17 Oct 2024 21:10)  HR: 69 (18 Oct 2024 09:00) (69 - 95)  BP: 131/70 (18 Oct 2024 09:00) (128/82 - 153/72)  BP(mean): --  RR: 18 (18 Oct 2024 09:00) (18 - 20)  SpO2: 94% (18 Oct 2024 09:00) (94% - 97%)    Parameters below as of 18 Oct 2024 09:00  Patient On (Oxygen Delivery Method): room air        MEDICATIONS  (STANDING):  aspirin  chewable 81 milliGRAM(s) Oral daily  atorvastatin 40 milliGRAM(s) Oral at bedtime  cefTRIAXone   IVPB 2000 milliGRAM(s) IV Intermittent every 24 hours  enoxaparin Injectable 30 milliGRAM(s) SubCutaneous every 24 hours  lisinopril 5 milliGRAM(s) Oral every 24 hours  polyethylene glycol 3350 17 Gram(s) Oral two times a day  senna 2 Tablet(s) Oral at bedtime    MEDICATIONS  (PRN):      T(C): 36.7 (10-18-24 @ 09:00), Max: 37.1 (10-17-24 @ 21:10)  HR: 69 (10-18-24 @ 09:00) (69 - 95)  BP: 131/70 (10-18-24 @ 09:00) (128/82 - 153/72)  RR: 18 (10-18-24 @ 09:00) (18 - 20)  SpO2: 94% (10-18-24 @ 09:00) (94% - 97%)    Physical Exam:   84 y o woman lying comfortably in semi Menjivar's position , awake ,  NAD     Head: normocephalic , atraumatic    Eyes: PERRLA , EOMI , no nystagmus , sclera anicteric    ENT / FACE: discoloration of R. upper cheek s/p recent Mohs surgery , neg nasal discharge , uvula midline , no oropharyngeal erythema / exudate    Neck: supple , negative JVD , negative carotid bruits , no thyromegaly    Chest: CTA bilaterally     Cardiovascular: regular rate and rhythm , neg murmurs / rubs / gallops    Abdomen: soft , non distended , no tenderness to palpation in all 4 quadrants ,  normal bowel sounds     Lower Extremities: WWP , neg cyanosis /clubbing / edema      Neurologic Exam:     Alert and oriented to person , place , speech fluent w/o dysarthria , follows commands   Cranial Nerves:           II:                         pupils equal , round and reactive to light , visual fields intact         III/ IV/VI:             extraocular movements intact , neg nystagmus , neg ptosis        V:                        facial sensation intact , V1-3 normal        VII:                      face symmetric , no droop , normal eye closure and smile        VIII:                     hearing intact to finger rub bilaterally        IX and X:             no hoarseness , gag intact , palate/ uvula rise symmetrically        XI:                       SCM / trapezius strength intact bilateral        XII:                      no tongue deviation    Motor Exam:                  Right UE/LE  4-/5 ,slight R UE drift            Left UE/LE :  5/5     Sensation:         intact to light touch x 4 extremities                            no neglect or extinction on double simultaneous testing    DTR:           biceps/brachioradialis: equal                            patella/ankle: equal          neg Babinski     Coordination:            Finger to Nose:  R dys         10/17/2024 Functional Status Assessment :       Pain Assessment/Number Scale (0-10) Adult  Presence of Pain: denies pain/discomfort (Rating = 0)  Pain Rating (0-10): Rest: 0 (no pain/absence of nonverbal indicators of pain)  Pain Rating (0-10): Activity: 0 (no pain/absence of nonverbal indicators of pain)    Safety      AM-PAC Functional Assessment: Basic Mobility  Type of Assessment: Daily assessment  Turning from your back to your side while in a flat bed without using bedrails?: 4 = No assist / stand by assistance  Moving from lying on your back to sitting on the flat side of a flat bed without using bedrails?: 4 = No assist / stand by assistance  Moving to and from a bed to a chair (including a wheelchair)?: 3 = A little assistance  Standing up from a chair using your arms (e.g. wheelchair or bedside chair)?: 3 = A little assistance  Walking in hospital room?: 3 = A little assistance  Climbing 3-5 steps with a railing?: 3 = A little assistance  Score: 20   Row Comment: Ask the patient "How much help from another person do you currently need? (If the patient hasn't done an activity recently, how much help from another person do you think he/she needs if he/she tried?)    Cognitive/Neuro      Cognitive/Neuro/Behavioral  Cognitive/Neuro/Behavioral [WDL Definition: Alert; opens eyes spontaneously; arouses to voice or touch; oriented x 4; follows commands; speech spontaneous, logical; purposeful motor response; behavior appropriate to situation]: WDL    Language Assistance  Preferred Language to Address Healthcare Preferred Language to Address Healthcare: English    Therapeutic Interventions      Bed Mobility  Bed Mobility Training Rolling/Turning: supervsion  Bed Mobility Training Scooting: supervsion  Bed Mobility Training Sit-to-Supine: supervsion  Bed Mobility Training Supine-to-Sit: supervsion  Bed Mobility Training Limitations: impaired ability to control trunk for mobility;  decreased ability to use legs for bridging/pushing;  impaired balance;  decreased strength;  impaired postural control    Sit-Stand Transfer Training  Transfer Training Sit-to-Stand Transfer: supervsion  Transfer Training Stand-to-Sit Transfer: contact guard  Sit-to-Stand Transfer Training Transfer Safety Analysis: decreased weight-shifting ability;  impaired balance    Gait Training  Gait Training: contact guard;  200 feet  Gait Analysis: decreased rekha;  increased time in double stance;  decreased velocity of limb motion;  decreased toe clearance;  Pt demo overall improvement in gait sequencing. Demo ability to maneuver around obstalces to avoid collision. Good aerobic endurance, no SOB observed.     Stair Training  Physical Assist/Nonphysical Assist: contact guard  Assistive Device: left rail up;  right rail down  Number of Stairs: 12     Therapeutic Exercise  Therapeutic Exercise Detail: Seated ankle pumps, Seated LAQ     Neuromuscular Re-education  Neuromuscular Re-education Detail: BLE heel shin slide & heel tapping coordination testing - intact, no dysmetria noted. CN Testing - Pt demonstrated symmetrical smiling, Able to protrude and manipulate tongue movements w/o difficulty. Light touch sensation intact bilaterally throughout entire face. Equal hearing sensation. Romberg Assessed - pt able to demonstate normal CIERRA and narrow CIERRA w/ eyes open and eyes closed without difficulty or LOB. Pt able to demonstrate tandem stance with eyes open however unable to maintain standing tandem balance >3 secs with eyes closed. No falls or acute distress noted.             PM&R Impression : as above    Current disposition plan recommendation :      d/c home with home physical therapy

## 2024-10-18 NOTE — DISCHARGE NOTE NURSING/CASE MANAGEMENT/SOCIAL WORK - FINANCIAL ASSISTANCE
[facility] provides services at a reduced cost to those who are determined to be eligible through [facility]’s financial assistance program. Information regarding [facility]’s financial assistance program can be found by going to [email or web address] or by calling [phone].

## 2024-10-18 NOTE — PROGRESS NOTE ADULT - ASSESSMENT
I M    84 y o female with a recent Alzheimer's diagnosis, HTN, and mild aortic insufficiency presented with two weeks of fatigue, malaise and urinary frequency with recent diagnosis of a UTI on 10/7 for which she started antibiotics on 10/14. In the ED she was febrile, tachycardic and hypotensive with leukocytosis. She was started on IV antibiotics and briefly required pressors and was admitted to the MICU for septic shock 2/2 UTI. There was concern for concomitant stroke due to L arm weakness,  however CT imagine negative and patient still pending MRI. TTE done on 10/15 showed a reduction in LVEF to 40-45% and cardiology was consulted for further evaluation. Bedside pocus found b/l pleural effusions and right lower lobe consolidation concerning  for PNA     Problem/Plan - 1:  ·  Problem: Septic shock.   ·  Plan: (Resolved)   septic shock 2/2 UTI vs Pneumonia  In the ED, she was febrile (101.4) and tachycardic (110), hypotensive (109/67), WBC (28), and grossly +UA (>400 WBC, mod leuk est)  MAPs remained 60-65 despite adequate fluid resuscitation  Patient placed briefly on levophed in ED, and taken off pressors shortly after arriving at  septic shock 2/2 UTI, with no pressor requirements,  and hemodynamically stable  Urine Culture from outside provider grew E.Coli  10/14 Urine culture at [facility] had normal urogenital tawnya  10/15 RVP Positive for Entero Rhinovirus   10/14 BCX NGTD @ 24 hrs     -Maintain MAP >70 if possible given recent possible CVA, currently off of vasopressors   -f/u blood cultures  -Ceftriaxone 2 g QD (last day 10/19 for 5 day course).    Problem/Plan - 2:  ·  Problem: UTI (urinary tract infection).   ·  Plan: Patient with complaints of urinary urgency, and dysuria prior to admission. Had seen PCP who prescribed Augmentin (only taken for 2 days, delay in receiving script). Urine cultures from PCP grew E Coli.  Patient returned to ED due to chills and fever   grossly +UA (>400 WBC, mod leuk est)on admission  10/14 Urine culture at [facility] had normal urogenital tawnya  10/14 BCX NGTD @ 24 hrs    -f/u blood cultures  -Ceftriaxone 2 g QD.    Problem/Plan - 3:  ·  Problem: Pneumonia.   ·  Plan: 12/16 POCUS: The ultrasound findings indicate bilateral pleural effusions on both the right and left sides, with consolidation identified in the right lower lobe. The diagnosis includes pleural effusion on both sides and pneumonia located specifically in the right lower lobe.  strep AG negative and legionella ag negative  Patient afebrile in past 24 hours, with no complaints of cough, breathing comfortably on room air,  however WBC still elevated at 23/36    -Ceftriaxone 2 g QD x5 days.    Problem/Plan - 4:  ·  Problem: Muscle left arm weakness.   ·  Plan: In the ED the patient's daughter noticed L arm weakness and drift, which prompted a stroke code.   No stroke acute intervention  given imaging was  negative and cause of weakness unknown  CT head showed: No acute hemorrhage or stroke   CTA/CTP no LVO, stenosis, dissection. No penumbra/tissue at risk for active ischemia     - continue ASA 81mg PO daily for Secondary stroke prevention  - f/u MRI brain without to r/o cerebellar stroke  -Neurology following; f/u reccs.    Problem/Plan - 5:  ·  Problem: Heart failure.   ·  Plan: TTE 10/15/2024: Mildly reduced left ventricular systolic function. (EF 45%). LVIDd 4.23cm. Mildly reduced RV systolic function. Normal atria. Moderate aortic regurgitation. No other significant valvular disease.. No evidence of pulmonary hypertension. No pericardial effusion.  suspect LV dysfunction likely seen in setting of Septic Cardiomyopathy, now resolving    -Per cardiology; will repeat limited Echo for LV assessment prior to starting GDMT.   -Cardiology following;  f/u reccs.    Problem/Plan - 6:  ·  Problem: Elevated troponin.   ·  Plan: Elevated Troponin on admission 368->449, however now downtrended to 301.   Patient did not report chest pain before or during admission   EKG without ischemic changes  Troponin elevation, likely demand ischemia 2/2 UTI & sepsis.    Problem/Plan - 7:  ·  Problem: Hypertension.   ·  Plan: History of Hypertension   home meds: lisinopril 20 mg QD, HCTZ   BP in the last 24 hrs 99/56 - 143/66  -Per Cardiology, pt may resume home lisinopril at low dose 5 mg po daily starting 10/17 am if patient continues to improve clinically.    Problem/Plan - 8:  ·  Problem: Hyponatremia.   ·  Plan: Na 129-> 126 on admission after 1.55L NS bolus, asymptomatic at this time.  Clinically hypovolemic on exam w/ poor PO intake for past 5 days iso generalized weakness and malaise iso septic and hypovolemic shock 2/2 UTI; pt additionally on diuretic with poor PO intake which may have contributed to hypovolemic hyponatremia   The urine studies show a urine sodium (Na) level of 20 mEq/L and a urine osmolality of 322 mOsm/kg.   these findings may suggest that the body is retaining both sodium and water, possibly due to a state of dehydration or volume depletion  10/16 s/p 500cc NS over 1 hour  10/16 Na of 126   -f/u am BMP  - encourage po intake.    Problem/Plan - 9:  ·  Problem: NERIS (acute kidney injury).   ·  Plan: Baseline Cr 0.74 documented from 07/2024.  Patient was on home Lisinopril 20 mg.    ACEi  with superimposed sepsis 2/2 UTI likely contributing to kidney injury.   Hypovolemic on admission  exam  NERIS likely prerenal etiology iso poor PO intake for past 5 days,   BUN: 48 > 45> 36> 27  Cr: 1.46 > 1.42 > 1.28>1.18  Cr an Bun downtrending    - ctm BUN/Cr   - Holding nephrotoxic meds including home lisinopril.    Problem/Plan - 10:  ·  Problem: Alzheimer disease.   ·  Plan; Patient had recent diagnosis of Alzheimer's dementia  after undergoing neurocognitive testing. Was started on donepezil 09/2024 but discontinued by daughter after she felt mental status was worsening.   urrently AOx3 on exam which is baseline mental status per daughter   -Delirium precautions   -Holding home donepezil   -Avoid sedating agents.    Problem/Plan - 11:  ·  Problem: Constipation.   ·  Plan: Patient reported last bowel movement on 10/10  -Miralax and Senna, increased miralax to BID   -will escalate to bisacodyl supp if needed.    Problem/Plan - 12:  ·  Problem: Prophylactic measure.   ·  Plan: Dvt ppx: SCD, heparin -> lovenox i/s/o improving renal function  PPI ppx: None  Diet: Regular   Dispo: RMF   Activity: Bedrest

## 2024-10-18 NOTE — DISCHARGE NOTE NURSING/CASE MANAGEMENT/SOCIAL WORK - PATIENT PORTAL LINK FT
You can access the FollowMyHealth Patient Portal offered by [facility] by registering at the following website: [email or web address]. By joining [employer]’s FollowMyHealth portal, you will also be able to view your health information using other applications (apps) compatible with our system.

## 2024-10-20 LAB
CULTURE RESULTS: SIGNIFICANT CHANGE UP
CULTURE RESULTS: SIGNIFICANT CHANGE UP
SPECIMEN SOURCE: SIGNIFICANT CHANGE UP
SPECIMEN SOURCE: SIGNIFICANT CHANGE UP

## 2024-10-21 ENCOUNTER — NON-APPOINTMENT (OUTPATIENT)
Age: 84
End: 2024-10-21

## 2024-10-21 ENCOUNTER — APPOINTMENT (OUTPATIENT)
Dept: HEART AND VASCULAR | Facility: CLINIC | Age: 84
End: 2024-10-21
Payer: MEDICARE

## 2024-10-21 VITALS — SYSTOLIC BLOOD PRESSURE: 134 MMHG | DIASTOLIC BLOOD PRESSURE: 73 MMHG

## 2024-10-21 VITALS
HEIGHT: 61 IN | WEIGHT: 123.99 LBS | OXYGEN SATURATION: 99 % | SYSTOLIC BLOOD PRESSURE: 147 MMHG | BODY MASS INDEX: 23.41 KG/M2 | TEMPERATURE: 98.5 F | HEART RATE: 82 BPM | DIASTOLIC BLOOD PRESSURE: 73 MMHG

## 2024-10-21 DIAGNOSIS — I50.20 UNSPECIFIED SYSTOLIC (CONGESTIVE) HEART FAILURE: ICD-10-CM

## 2024-10-21 PROBLEM — I10 ESSENTIAL (PRIMARY) HYPERTENSION: Chronic | Status: ACTIVE | Noted: 2024-10-14

## 2024-10-21 PROBLEM — G30.9 ALZHEIMER'S DISEASE, UNSPECIFIED: Chronic | Status: ACTIVE | Noted: 2024-10-14

## 2024-10-21 PROBLEM — E78.5 HYPERLIPIDEMIA, UNSPECIFIED: Chronic | Status: ACTIVE | Noted: 2024-10-14

## 2024-10-21 PROCEDURE — 93000 ELECTROCARDIOGRAM COMPLETE: CPT

## 2024-10-21 PROCEDURE — 99495 TRANSJ CARE MGMT MOD F2F 14D: CPT

## 2024-10-21 RX ORDER — ATORVASTATIN CALCIUM 40 MG/1
40 TABLET, FILM COATED ORAL
Qty: 90 | Refills: 3 | Status: ACTIVE | COMMUNITY
Start: 2024-10-21 | End: 1900-01-01

## 2024-10-21 RX ORDER — LISINOPRIL 5 MG/1
5 TABLET ORAL DAILY
Qty: 90 | Refills: 3 | Status: ACTIVE | COMMUNITY
Start: 1900-01-01 | End: 1900-01-01

## 2024-10-21 RX ORDER — METOPROLOL SUCCINATE 25 MG/1
25 TABLET, EXTENDED RELEASE ORAL
Qty: 30 | Refills: 0 | Status: DISCONTINUED | COMMUNITY
Start: 2024-10-21 | End: 2024-10-21

## 2024-10-22 ENCOUNTER — TRANSCRIPTION ENCOUNTER (OUTPATIENT)
Age: 84
End: 2024-10-22

## 2024-10-25 ENCOUNTER — TRANSCRIPTION ENCOUNTER (OUTPATIENT)
Age: 84
End: 2024-10-25

## 2024-10-25 DIAGNOSIS — I21.A1 MYOCARDIAL INFARCTION TYPE 2: ICD-10-CM

## 2024-10-25 DIAGNOSIS — B34.1 ENTEROVIRUS INFECTION, UNSPECIFIED: ICD-10-CM

## 2024-10-25 DIAGNOSIS — F02.80 DEMENTIA IN OTHER DISEASES CLASSIFIED ELSEWHERE, UNSPECIFIED SEVERITY, WITHOUT BEHAVIORAL DISTURBANCE, PSYCHOTIC DISTURBANCE, MOOD DISTURBANCE, AND ANXIETY: ICD-10-CM

## 2024-10-25 DIAGNOSIS — I11.0 HYPERTENSIVE HEART DISEASE WITH HEART FAILURE: ICD-10-CM

## 2024-10-25 DIAGNOSIS — E87.1 HYPO-OSMOLALITY AND HYPONATREMIA: ICD-10-CM

## 2024-10-25 DIAGNOSIS — E87.6 HYPOKALEMIA: ICD-10-CM

## 2024-10-25 DIAGNOSIS — N17.9 ACUTE KIDNEY FAILURE, UNSPECIFIED: ICD-10-CM

## 2024-10-25 DIAGNOSIS — R65.21 SEVERE SEPSIS WITH SEPTIC SHOCK: ICD-10-CM

## 2024-10-25 DIAGNOSIS — I50.20 UNSPECIFIED SYSTOLIC (CONGESTIVE) HEART FAILURE: ICD-10-CM

## 2024-10-25 DIAGNOSIS — A41.9 SEPSIS, UNSPECIFIED ORGANISM: ICD-10-CM

## 2024-10-25 DIAGNOSIS — K59.00 CONSTIPATION, UNSPECIFIED: ICD-10-CM

## 2024-10-25 DIAGNOSIS — I50.82 BIVENTRICULAR HEART FAILURE: ICD-10-CM

## 2024-10-25 DIAGNOSIS — G30.9 ALZHEIMER'S DISEASE, UNSPECIFIED: ICD-10-CM

## 2024-10-25 DIAGNOSIS — N39.0 URINARY TRACT INFECTION, SITE NOT SPECIFIED: ICD-10-CM

## 2024-10-25 DIAGNOSIS — E78.5 HYPERLIPIDEMIA, UNSPECIFIED: ICD-10-CM

## 2024-10-25 DIAGNOSIS — E44.1 MILD PROTEIN-CALORIE MALNUTRITION: ICD-10-CM

## 2024-10-25 DIAGNOSIS — I35.1 NONRHEUMATIC AORTIC (VALVE) INSUFFICIENCY: ICD-10-CM

## 2024-10-25 DIAGNOSIS — R57.1 HYPOVOLEMIC SHOCK: ICD-10-CM

## 2024-10-30 RX ORDER — FUROSEMIDE 20 MG/1
20 TABLET ORAL
Qty: 30 | Refills: 2 | Status: ACTIVE | COMMUNITY
Start: 2024-10-21 | End: 1900-01-01

## 2024-11-07 ENCOUNTER — APPOINTMENT (OUTPATIENT)
Dept: HEART AND VASCULAR | Facility: CLINIC | Age: 84
End: 2024-11-07
Payer: MEDICARE

## 2024-11-07 DIAGNOSIS — I50.20 UNSPECIFIED SYSTOLIC (CONGESTIVE) HEART FAILURE: ICD-10-CM

## 2024-11-07 PROCEDURE — 99213 OFFICE O/P EST LOW 20 MIN: CPT

## 2024-11-26 PROCEDURE — 86901 BLOOD TYPING SEROLOGIC RH(D): CPT

## 2024-11-26 PROCEDURE — 84443 ASSAY THYROID STIM HORMONE: CPT

## 2024-11-26 PROCEDURE — 97112 NEUROMUSCULAR REEDUCATION: CPT

## 2024-11-26 PROCEDURE — 97165 OT EVAL LOW COMPLEX 30 MIN: CPT

## 2024-11-26 PROCEDURE — 97116 GAIT TRAINING THERAPY: CPT

## 2024-11-26 PROCEDURE — 83721 ASSAY OF BLOOD LIPOPROTEIN: CPT

## 2024-11-26 PROCEDURE — 0225U NFCT DS DNA&RNA 21 SARSCOV2: CPT

## 2024-11-26 PROCEDURE — 97110 THERAPEUTIC EXERCISES: CPT

## 2024-11-26 PROCEDURE — 87086 URINE CULTURE/COLONY COUNT: CPT

## 2024-11-26 PROCEDURE — 83036 HEMOGLOBIN GLYCOSYLATED A1C: CPT

## 2024-11-26 PROCEDURE — 84484 ASSAY OF TROPONIN QUANT: CPT

## 2024-11-26 PROCEDURE — 97161 PT EVAL LOW COMPLEX 20 MIN: CPT

## 2024-11-26 PROCEDURE — 81001 URINALYSIS AUTO W/SCOPE: CPT

## 2024-11-26 PROCEDURE — 87449 NOS EACH ORGANISM AG IA: CPT

## 2024-11-26 PROCEDURE — 83930 ASSAY OF BLOOD OSMOLALITY: CPT

## 2024-11-26 PROCEDURE — 87040 BLOOD CULTURE FOR BACTERIA: CPT

## 2024-11-26 PROCEDURE — 70498 CT ANGIOGRAPHY NECK: CPT | Mod: MC

## 2024-11-26 PROCEDURE — 80048 BASIC METABOLIC PNL TOTAL CA: CPT

## 2024-11-26 PROCEDURE — 85730 THROMBOPLASTIN TIME PARTIAL: CPT

## 2024-11-26 PROCEDURE — 99285 EMERGENCY DEPT VISIT HI MDM: CPT

## 2024-11-26 PROCEDURE — 85025 COMPLETE CBC W/AUTO DIFF WBC: CPT

## 2024-11-26 PROCEDURE — 93321 DOPPLER ECHO F-UP/LMTD STD: CPT

## 2024-11-26 PROCEDURE — 82553 CREATINE MB FRACTION: CPT

## 2024-11-26 PROCEDURE — 84300 ASSAY OF URINE SODIUM: CPT

## 2024-11-26 PROCEDURE — 82570 ASSAY OF URINE CREATININE: CPT

## 2024-11-26 PROCEDURE — 84100 ASSAY OF PHOSPHORUS: CPT

## 2024-11-26 PROCEDURE — 86850 RBC ANTIBODY SCREEN: CPT

## 2024-11-26 PROCEDURE — 70450 CT HEAD/BRAIN W/O DYE: CPT | Mod: MC

## 2024-11-26 PROCEDURE — 93005 ELECTROCARDIOGRAM TRACING: CPT

## 2024-11-26 PROCEDURE — 83935 ASSAY OF URINE OSMOLALITY: CPT

## 2024-11-26 PROCEDURE — 83735 ASSAY OF MAGNESIUM: CPT

## 2024-11-26 PROCEDURE — 0042T: CPT | Mod: MC

## 2024-11-26 PROCEDURE — 86900 BLOOD TYPING SEROLOGIC ABO: CPT

## 2024-11-26 PROCEDURE — 80076 HEPATIC FUNCTION PANEL: CPT

## 2024-11-26 PROCEDURE — 83605 ASSAY OF LACTIC ACID: CPT

## 2024-11-26 PROCEDURE — 70551 MRI BRAIN STEM W/O DYE: CPT | Mod: MC

## 2024-11-26 PROCEDURE — 80053 COMPREHEN METABOLIC PANEL: CPT

## 2024-11-26 PROCEDURE — 85610 PROTHROMBIN TIME: CPT

## 2024-11-26 PROCEDURE — 85027 COMPLETE CBC AUTOMATED: CPT

## 2024-11-26 PROCEDURE — 93306 TTE W/DOPPLER COMPLETE: CPT

## 2024-11-26 PROCEDURE — 36415 COLL VENOUS BLD VENIPUNCTURE: CPT

## 2024-11-26 PROCEDURE — 82550 ASSAY OF CK (CPK): CPT

## 2024-11-26 PROCEDURE — 80307 DRUG TEST PRSMV CHEM ANLYZR: CPT

## 2024-11-26 PROCEDURE — 70496 CT ANGIOGRAPHY HEAD: CPT | Mod: MC

## 2024-11-26 PROCEDURE — 87899 AGENT NOS ASSAY W/OPTIC: CPT

## 2024-12-12 ENCOUNTER — NON-APPOINTMENT (OUTPATIENT)
Age: 84
End: 2024-12-12

## 2024-12-12 ENCOUNTER — APPOINTMENT (OUTPATIENT)
Dept: HEART AND VASCULAR | Facility: CLINIC | Age: 84
End: 2024-12-12
Payer: MEDICARE

## 2024-12-12 VITALS
BODY MASS INDEX: 20.77 KG/M2 | SYSTOLIC BLOOD PRESSURE: 138 MMHG | HEART RATE: 61 BPM | HEIGHT: 61 IN | OXYGEN SATURATION: 99 % | TEMPERATURE: 97.6 F | DIASTOLIC BLOOD PRESSURE: 78 MMHG | WEIGHT: 110 LBS

## 2024-12-12 DIAGNOSIS — I10 ESSENTIAL (PRIMARY) HYPERTENSION: ICD-10-CM

## 2024-12-12 DIAGNOSIS — R94.31 ABNORMAL ELECTROCARDIOGRAM [ECG] [EKG]: ICD-10-CM

## 2024-12-12 DIAGNOSIS — R01.1 CARDIAC MURMUR, UNSPECIFIED: ICD-10-CM

## 2024-12-12 DIAGNOSIS — I50.20 UNSPECIFIED SYSTOLIC (CONGESTIVE) HEART FAILURE: ICD-10-CM

## 2024-12-12 DIAGNOSIS — E78.00 PURE HYPERCHOLESTEROLEMIA, UNSPECIFIED: ICD-10-CM

## 2024-12-12 PROCEDURE — 99214 OFFICE O/P EST MOD 30 MIN: CPT

## 2025-01-05 NOTE — DISCHARGE NOTE NURSING/CASE MANAGEMENT/SOCIAL WORK - NSDCVIVACCINE_GEN_ALL_CORE_FT
No Vaccines Administered. Bed/Stretcher in lowest position, wheels locked, appropriate side rails in place/Call bell, personal items and telephone in reach/Instruct patient to call for assistance before getting out of bed/chair/stretcher/Non-slip footwear applied when patient is off stretcher/[place] to call system/Physically safe environment - no spills, clutter or unnecessary equipment/Purposeful proactive rounding/Room/bathroom lighting operational, light cord in reach

## 2025-04-17 ENCOUNTER — APPOINTMENT (OUTPATIENT)
Dept: HEART AND VASCULAR | Facility: CLINIC | Age: 85
End: 2025-04-17
Payer: MEDICARE

## 2025-04-17 VITALS
SYSTOLIC BLOOD PRESSURE: 130 MMHG | OXYGEN SATURATION: 97 % | DIASTOLIC BLOOD PRESSURE: 68 MMHG | TEMPERATURE: 98.2 F | HEART RATE: 74 BPM | WEIGHT: 112 LBS | HEIGHT: 61 IN | BODY MASS INDEX: 21.14 KG/M2

## 2025-04-17 DIAGNOSIS — I35.1 NONRHEUMATIC AORTIC (VALVE) INSUFFICIENCY: ICD-10-CM

## 2025-04-17 DIAGNOSIS — E78.00 PURE HYPERCHOLESTEROLEMIA, UNSPECIFIED: ICD-10-CM

## 2025-04-17 DIAGNOSIS — I10 ESSENTIAL (PRIMARY) HYPERTENSION: ICD-10-CM

## 2025-04-17 DIAGNOSIS — R01.1 CARDIAC MURMUR, UNSPECIFIED: ICD-10-CM

## 2025-04-17 DIAGNOSIS — I50.20 UNSPECIFIED SYSTOLIC (CONGESTIVE) HEART FAILURE: ICD-10-CM

## 2025-04-17 PROCEDURE — 99214 OFFICE O/P EST MOD 30 MIN: CPT

## 2025-04-17 PROCEDURE — 93306 TTE W/DOPPLER COMPLETE: CPT

## 2025-09-16 ENCOUNTER — APPOINTMENT (OUTPATIENT)
Dept: HEART AND VASCULAR | Facility: CLINIC | Age: 85
End: 2025-09-16
Payer: MEDICARE

## 2025-09-16 VITALS
TEMPERATURE: 97.5 F | HEART RATE: 71 BPM | SYSTOLIC BLOOD PRESSURE: 160 MMHG | OXYGEN SATURATION: 98 % | HEIGHT: 61 IN | BODY MASS INDEX: 20.77 KG/M2 | WEIGHT: 110 LBS | DIASTOLIC BLOOD PRESSURE: 80 MMHG

## 2025-09-16 DIAGNOSIS — R94.31 ABNORMAL ELECTROCARDIOGRAM [ECG] [EKG]: ICD-10-CM

## 2025-09-16 DIAGNOSIS — I50.20 UNSPECIFIED SYSTOLIC (CONGESTIVE) HEART FAILURE: ICD-10-CM

## 2025-09-16 DIAGNOSIS — I35.1 NONRHEUMATIC AORTIC (VALVE) INSUFFICIENCY: ICD-10-CM

## 2025-09-16 DIAGNOSIS — E78.00 PURE HYPERCHOLESTEROLEMIA, UNSPECIFIED: ICD-10-CM

## 2025-09-16 DIAGNOSIS — I10 ESSENTIAL (PRIMARY) HYPERTENSION: ICD-10-CM

## 2025-09-16 PROCEDURE — 93306 TTE W/DOPPLER COMPLETE: CPT

## 2025-09-16 PROCEDURE — 99214 OFFICE O/P EST MOD 30 MIN: CPT

## 2025-09-16 RX ORDER — AMLODIPINE BESYLATE 2.5 MG/1
2.5 TABLET ORAL DAILY
Qty: 90 | Refills: 1 | Status: ACTIVE | COMMUNITY
Start: 2025-09-16 | End: 1900-01-01